# Patient Record
Sex: FEMALE | Race: WHITE | NOT HISPANIC OR LATINO | Employment: OTHER | URBAN - NONMETROPOLITAN AREA
[De-identification: names, ages, dates, MRNs, and addresses within clinical notes are randomized per-mention and may not be internally consistent; named-entity substitution may affect disease eponyms.]

---

## 2017-03-17 RX ORDER — FLUOXETINE HYDROCHLORIDE 20 MG/1
20 CAPSULE ORAL DAILY
COMMUNITY
End: 2018-01-04

## 2017-03-17 RX ORDER — HYDROCHLOROTHIAZIDE 25 MG/1
25 TABLET ORAL EVERY MORNING
COMMUNITY
End: 2018-09-07 | Stop reason: HOSPADM

## 2017-03-17 RX ORDER — SENNOSIDES 8.6 MG
650 CAPSULE ORAL EVERY 8 HOURS PRN
Status: ON HOLD | COMMUNITY
End: 2018-08-06

## 2017-03-17 RX ORDER — ASPIRIN 81 MG/1
81 TABLET, CHEWABLE ORAL EVERY MORNING
COMMUNITY

## 2017-03-17 RX ORDER — FUROSEMIDE 40 MG/1
40 TABLET ORAL DAILY
COMMUNITY
End: 2017-03-20

## 2017-03-17 RX ORDER — POTASSIUM CHLORIDE 750 MG/1
10 TABLET, FILM COATED, EXTENDED RELEASE ORAL DAILY
COMMUNITY
End: 2017-05-23 | Stop reason: SDUPTHER

## 2017-03-17 RX ORDER — TRAZODONE HYDROCHLORIDE 50 MG/1
50 TABLET ORAL NIGHTLY
COMMUNITY
End: 2017-03-20 | Stop reason: SDUPTHER

## 2017-03-17 RX ORDER — HYDROCODONE BITARTRATE AND ACETAMINOPHEN 7.5; 325 MG/1; MG/1
1 TABLET ORAL AS NEEDED
COMMUNITY
End: 2017-03-20 | Stop reason: CLARIF

## 2017-03-17 RX ORDER — CYCLOBENZAPRINE HCL 10 MG
10 TABLET ORAL 3 TIMES DAILY PRN
COMMUNITY
End: 2018-01-04

## 2017-03-17 RX ORDER — ISOSORBIDE MONONITRATE 30 MG/1
30 TABLET, EXTENDED RELEASE ORAL EVERY MORNING
COMMUNITY

## 2017-03-17 RX ORDER — PRAVASTATIN SODIUM 20 MG
20 TABLET ORAL NIGHTLY
COMMUNITY
End: 2017-05-23 | Stop reason: SDUPTHER

## 2017-03-17 RX ORDER — PRAMIPEXOLE DIHYDROCHLORIDE 0.25 MG/1
0.25 TABLET ORAL NIGHTLY
COMMUNITY

## 2017-03-17 RX ORDER — DILTIAZEM HYDROCHLORIDE 360 MG/1
360 CAPSULE, EXTENDED RELEASE ORAL NIGHTLY
COMMUNITY

## 2017-03-17 RX ORDER — NYSTATIN 100000 U/G
CREAM TOPICAL 2 TIMES DAILY
COMMUNITY
End: 2018-01-04

## 2017-03-20 ENCOUNTER — OFFICE VISIT (OUTPATIENT)
Dept: CARDIOLOGY | Facility: CLINIC | Age: 77
End: 2017-03-20

## 2017-03-20 VITALS
BODY MASS INDEX: 49.51 KG/M2 | HEIGHT: 64 IN | SYSTOLIC BLOOD PRESSURE: 120 MMHG | DIASTOLIC BLOOD PRESSURE: 52 MMHG | WEIGHT: 290 LBS | HEART RATE: 69 BPM | OXYGEN SATURATION: 94 %

## 2017-03-20 DIAGNOSIS — I10 ESSENTIAL HYPERTENSION: Primary | ICD-10-CM

## 2017-03-20 DIAGNOSIS — E78.5 HYPERLIPIDEMIA, UNSPECIFIED HYPERLIPIDEMIA TYPE: ICD-10-CM

## 2017-03-20 DIAGNOSIS — R06.09 DYSPNEA ON EXERTION: ICD-10-CM

## 2017-03-20 DIAGNOSIS — I20.9 ISCHEMIC CHEST PAIN (HCC): ICD-10-CM

## 2017-03-20 DIAGNOSIS — E66.01 MORBID OBESITY DUE TO EXCESS CALORIES (HCC): ICD-10-CM

## 2017-03-20 PROCEDURE — 99214 OFFICE O/P EST MOD 30 MIN: CPT | Performed by: INTERNAL MEDICINE

## 2017-03-20 RX ORDER — TRAZODONE HYDROCHLORIDE 100 MG/1
TABLET ORAL
Refills: 5 | COMMUNITY
Start: 2017-02-20 | End: 2018-01-04

## 2017-03-20 RX ORDER — LANSOPRAZOLE 30 MG/1
CAPSULE, DELAYED RELEASE ORAL
Refills: 5 | COMMUNITY
Start: 2017-02-20 | End: 2018-01-29 | Stop reason: SDUPTHER

## 2017-03-20 RX ORDER — HYDROCODONE BITARTRATE AND ACETAMINOPHEN 10; 325 MG/1; MG/1
1 TABLET ORAL EVERY 12 HOURS PRN
COMMUNITY
Start: 2017-03-13

## 2017-03-20 RX ORDER — GABAPENTIN 100 MG/1
200 CAPSULE ORAL NIGHTLY
COMMUNITY
Start: 2017-03-13

## 2017-03-20 RX ORDER — BUMETANIDE 1 MG/1
1 TABLET ORAL EVERY MORNING
COMMUNITY
Start: 2017-01-05

## 2017-03-20 RX ORDER — LISINOPRIL 20 MG/1
40 TABLET ORAL EVERY MORNING
COMMUNITY
Start: 2017-03-13

## 2017-03-20 RX ORDER — DULOXETIN HYDROCHLORIDE 60 MG/1
CAPSULE, DELAYED RELEASE ORAL
Refills: 6 | COMMUNITY
Start: 2017-02-20 | End: 2018-01-29 | Stop reason: SDUPTHER

## 2017-03-20 NOTE — PROGRESS NOTES
Chief complaint : Shortness of breath    History of Present Illness very pleasant 76-year-old female who comes today for a follow-up visit.  She complains of chest discomfort which is localized in the midsternal area.  The pain does not radiate.  She has no associated symptoms of diaphoresis.  The discomfort starts at minimal physical exertion and associated with shortness of breath.  It is relieved at rest.  Patient stated that the chest discomfort is becoming more constant and progressively getting worse.     Review of Systems   Constitution: Negative. Negative for decreased appetite, diaphoresis, weakness, night sweats, weight gain and weight loss.   HENT: Negative for headaches, hearing loss, nosebleeds and sore throat.    Eyes: Negative.  Negative for blurred vision and photophobia.   Cardiovascular: Positive for chest pain and dyspnea on exertion. Negative for claudication, irregular heartbeat, leg swelling, palpitations, paroxysmal nocturnal dyspnea and syncope.   Respiratory: Negative for cough, hemoptysis, shortness of breath and wheezing.    Endocrine: Negative for cold intolerance, heat intolerance, polydipsia, polyphagia and polyuria.   Hematologic/Lymphatic: Negative.    Skin: Negative for color change, dry skin, flushing, itching and rash.   Musculoskeletal: Negative.  Negative for muscle cramps, muscle weakness and myalgias.   Gastrointestinal: Negative for abdominal pain, change in bowel habit, diarrhea, hematemesis, melena, nausea and vomiting.   Genitourinary: Negative for dysuria, frequency and hematuria.   Neurological: Negative for dizziness, focal weakness, light-headedness, loss of balance, numbness and seizures.   Psychiatric/Behavioral: Negative.  Negative for substance abuse, suicidal ideas and thoughts of violence.   Allergic/Immunologic: Negative.        Past Medical History   Diagnosis Date   • Bronchitis    • Chest pain    • CHF (congestive heart failure)    • GERD (gastroesophageal  reflux disease)    • Hyperlipidemia    • Hypertension    • Sleep apnea        No family history on file.    Morphine and related     reports that she has quit smoking. She does not have any smokeless tobacco history on file. She reports that she does not drink alcohol or use illicit drugs.    Objective     Vital Signs  Heart Rate:  [69] 69  BP: (120)/(52) 120/52    Physical Exam   Constitutional: She is oriented to person, place, and time. She appears well-developed and well-nourished.   HENT:   Head: Normocephalic and atraumatic.   Eyes: Conjunctivae and EOM are normal. Pupils are equal, round, and reactive to light.   Neck: Neck supple. No JVD present. Carotid bruit is not present. No tracheal deviation and no edema present.   Cardiovascular: Normal rate, regular rhythm, S1 normal, S2 normal, normal heart sounds and intact distal pulses.  Exam reveals no gallop, no S3, no S4 and no friction rub.    No murmur heard.  Pulmonary/Chest: Effort normal and breath sounds normal. She has no wheezes. She has no rales. She exhibits no tenderness.   Abdominal: Bowel sounds are normal. She exhibits no abdominal bruit and no pulsatile midline mass. There is no rebound and no guarding.   Musculoskeletal: Normal range of motion. She exhibits no edema.   Neurological: She is alert and oriented to person, place, and time.   Skin: Skin is warm and dry.   Psychiatric: She has a normal mood and affect.       Procedures    Assessment/Plan     Patient Active Problem List   Diagnosis   • Morbid obesity due to excess calories   • Hyperlipidemia   • Essential hypertension     1. Essential hypertension  Patient's blood pressure is well-controlled.  We will continue with current medications.  Continue with low sodium diet.  Patient is advised to lose weight.    2. Hyperlipidemia, unspecified hyperlipidemia type  Patient persisted by her primary care provider.  We will try to obtain her lab results.  We will continue with current dosage of  pravastatin.    3. Morbid obesity due to excess calories  Patient is counseled regarding weight loss and regular exercise program.    4.  Chest pain  We will order nuclear perfusion imaging stress test.  Patient has a known nonobstructive coronary artery disease in the LAD and RCA.  Her last cardiac catheterization was 2013.    I discussed the patients findings and my recommendations with patient.    Robbie Bell MD  03/20/17  2:11 PM    EMR Dragon/Transcription disclaimer:   Much of this encounter note is an electronic transcription/translation of spoken language to printed text. The electronic translation of spoken language may permit erroneous, or at times, nonsensical words or phrases to be inadvertently transcribed; Although I have reviewed the note for such errors, some may still exist.

## 2017-03-24 DIAGNOSIS — R07.9 CHEST PAIN, UNSPECIFIED TYPE: Primary | ICD-10-CM

## 2017-04-03 ENCOUNTER — APPOINTMENT (OUTPATIENT)
Dept: CARDIOLOGY | Facility: HOSPITAL | Age: 77
End: 2017-04-03
Attending: INTERNAL MEDICINE

## 2017-04-03 ENCOUNTER — APPOINTMENT (OUTPATIENT)
Dept: NUCLEAR MEDICINE | Facility: HOSPITAL | Age: 77
End: 2017-04-03
Attending: INTERNAL MEDICINE

## 2017-04-10 ENCOUNTER — APPOINTMENT (OUTPATIENT)
Dept: NUCLEAR MEDICINE | Facility: HOSPITAL | Age: 77
End: 2017-04-10
Attending: INTERNAL MEDICINE

## 2017-04-10 ENCOUNTER — HOSPITAL ENCOUNTER (OUTPATIENT)
Dept: NUCLEAR MEDICINE | Facility: HOSPITAL | Age: 77
Discharge: HOME | End: 2017-04-10
Attending: INTERNAL MEDICINE

## 2017-04-10 ENCOUNTER — APPOINTMENT (OUTPATIENT)
Dept: CARDIOLOGY | Facility: HOSPITAL | Age: 77
End: 2017-04-10
Attending: INTERNAL MEDICINE

## 2017-04-10 DIAGNOSIS — E78.5 HYPERLIPIDEMIA, UNSPECIFIED HYPERLIPIDEMIA TYPE: ICD-10-CM

## 2017-04-10 DIAGNOSIS — E66.01 MORBID OBESITY DUE TO EXCESS CALORIES (HCC): ICD-10-CM

## 2017-04-10 DIAGNOSIS — R06.09 DYSPNEA ON EXERTION: ICD-10-CM

## 2017-04-10 DIAGNOSIS — I10 ESSENTIAL HYPERTENSION: ICD-10-CM

## 2017-04-10 DIAGNOSIS — I20.9 ISCHEMIC CHEST PAIN (HCC): ICD-10-CM

## 2017-04-10 PROCEDURE — A9500 TC99M SESTAMIBI: HCPCS | Performed by: INTERNAL MEDICINE

## 2017-04-10 PROCEDURE — 0 TECHNETIUM SESTAMIBI: Performed by: INTERNAL MEDICINE

## 2017-04-10 RX ADMIN — Medication 1 DOSE: at 09:52

## 2017-04-11 ENCOUNTER — HOSPITAL ENCOUNTER (OUTPATIENT)
Dept: NUCLEAR MEDICINE | Facility: HOSPITAL | Age: 77
Discharge: HOME | End: 2017-04-11
Attending: INTERNAL MEDICINE

## 2017-04-11 ENCOUNTER — HOSPITAL ENCOUNTER (OUTPATIENT)
Dept: CARDIOLOGY | Facility: HOSPITAL | Age: 77
Discharge: HOME | End: 2017-04-11
Attending: INTERNAL MEDICINE | Admitting: INTERNAL MEDICINE

## 2017-04-11 PROCEDURE — A9500 TC99M SESTAMIBI: HCPCS | Performed by: INTERNAL MEDICINE

## 2017-04-11 PROCEDURE — 78452 HT MUSCLE IMAGE SPECT MULT: CPT

## 2017-04-11 PROCEDURE — 78452 HT MUSCLE IMAGE SPECT MULT: CPT | Performed by: INTERNAL MEDICINE

## 2017-04-11 PROCEDURE — 93016 CV STRESS TEST SUPVJ ONLY: CPT | Performed by: INTERNAL MEDICINE

## 2017-04-11 PROCEDURE — 93017 CV STRESS TEST TRACING ONLY: CPT

## 2017-04-11 PROCEDURE — 93018 CV STRESS TEST I&R ONLY: CPT | Performed by: INTERNAL MEDICINE

## 2017-04-11 PROCEDURE — 0 TECHNETIUM SESTAMIBI: Performed by: INTERNAL MEDICINE

## 2017-04-11 RX ORDER — 0.9 % SODIUM CHLORIDE 0.9 %
10 VIAL (ML) INJECTION AS NEEDED
Status: DISCONTINUED | OUTPATIENT
Start: 2017-04-11 | End: 2017-04-12 | Stop reason: HOSPADM

## 2017-04-11 RX ADMIN — Medication 1 DOSE: at 08:58

## 2017-04-11 RX ADMIN — REGADENOSON 0.4 MG: 0.08 INJECTION, SOLUTION INTRAVENOUS at 08:58

## 2017-04-11 RX ADMIN — SODIUM CHLORIDE 10 ML: 9 INJECTION INTRAMUSCULAR; INTRAVENOUS; SUBCUTANEOUS at 08:58

## 2017-04-13 LAB
BH CV NUCLEAR PRIOR STUDY: 2
BH CV STRESS BP STAGE 1: NORMAL
BH CV STRESS COMMENTS STAGE 1: NORMAL
BH CV STRESS DOSE REGADENOSON STAGE 1: 0.4
BH CV STRESS DURATION MIN STAGE 1: 0
BH CV STRESS DURATION SEC STAGE 1: 10
BH CV STRESS HR STAGE 1: 56
BH CV STRESS PROTOCOL 1: NORMAL
BH CV STRESS RECOVERY BP: NORMAL MMHG
BH CV STRESS RECOVERY HR: 69 BPM
BH CV STRESS STAGE 1: 1
LV EF NUC BP: 68 %
MAXIMAL PREDICTED HEART RATE: 146 BPM
PERCENT MAX PREDICTED HR: 55.48 %
STRESS BASELINE BP: NORMAL MMHG
STRESS BASELINE HR: 61 BPM
STRESS PERCENT HR: 65 %
STRESS POST ESTIMATED WORKLOAD: 1 METS
STRESS POST PEAK BP: NORMAL MMHG
STRESS POST PEAK HR: 81 BPM
STRESS TARGET HR: 124 BPM

## 2017-04-18 RX ORDER — PRAMIPEXOLE DIHYDROCHLORIDE 0.25 MG/1
TABLET ORAL
Qty: 30 TABLET | Refills: 5 | OUTPATIENT
Start: 2017-04-18

## 2017-05-02 ENCOUNTER — TELEPHONE (OUTPATIENT)
Dept: CARDIOLOGY | Facility: CLINIC | Age: 77
End: 2017-05-02

## 2017-05-26 RX ORDER — POTASSIUM CHLORIDE 750 MG/1
TABLET, FILM COATED, EXTENDED RELEASE ORAL
Qty: 30 TABLET | Refills: 0 | Status: SHIPPED | OUTPATIENT
Start: 2017-05-26 | End: 2018-01-29 | Stop reason: SDUPTHER

## 2017-05-26 RX ORDER — PRAVASTATIN SODIUM 20 MG
TABLET ORAL
Qty: 30 TABLET | Refills: 6 | Status: SHIPPED | OUTPATIENT
Start: 2017-05-26 | End: 2018-01-29 | Stop reason: SDUPTHER

## 2017-06-14 ENCOUNTER — OFFICE VISIT (OUTPATIENT)
Dept: PULMONOLOGY | Facility: CLINIC | Age: 77
End: 2017-06-14

## 2017-06-14 VITALS
OXYGEN SATURATION: 97 % | BODY MASS INDEX: 49 KG/M2 | SYSTOLIC BLOOD PRESSURE: 122 MMHG | DIASTOLIC BLOOD PRESSURE: 63 MMHG | HEART RATE: 68 BPM | WEIGHT: 287 LBS | HEIGHT: 64 IN

## 2017-06-14 DIAGNOSIS — J98.4 RESTRICTIVE LUNG DISEASE: ICD-10-CM

## 2017-06-14 DIAGNOSIS — R06.02 SHORTNESS OF BREATH: Primary | ICD-10-CM

## 2017-06-14 PROCEDURE — 94010 BREATHING CAPACITY TEST: CPT | Performed by: INTERNAL MEDICINE

## 2017-06-14 PROCEDURE — 99203 OFFICE O/P NEW LOW 30 MIN: CPT | Performed by: INTERNAL MEDICINE

## 2017-06-14 RX ORDER — LIDOCAINE AND PRILOCAINE 25; 25 MG/G; MG/G
CREAM TOPICAL
COMMUNITY
Start: 2017-04-24 | End: 2018-01-29 | Stop reason: ALTCHOICE

## 2017-06-14 NOTE — PROGRESS NOTES
Subjective   Stephania Judd is a 76 y.o. female.   Chief complaint is shortness of breath  History of Present Illness   This lady is had dyspnea for number of years however the last few months is getting worse.  She complains of cough, intermittent wheezing, occasional sputum production and dyspnea on walking 100 feet.  She has been obese most of her life and has chronic back pain and walks with a cane.  She is also had pneumonia previously as well as depression, anemia, high blood pressure, arthritis.  Surgical history includes hysterectomy back surgery.  Medications please see her list.  She does not take breathing medications.  Family history is positive for multiple illnesses.  Social history smoked a small amount and her use  The following portions of the patient's history were reviewed and updated as appropriate: allergies, current medications, past family history, past medical history, past social history, past surgical history and problem list.    Review of Systems   Constitutional: Negative for activity change, chills, fatigue, fever and unexpected weight change.   HENT: Negative for congestion, dental problem, ear discharge, ear pain, facial swelling, hearing loss, nosebleeds, postnasal drip, rhinorrhea, sinus pressure, sneezing, sore throat, tinnitus, trouble swallowing and voice change.    Eyes: Negative.  Negative for photophobia, pain, discharge, redness, itching and visual disturbance.   Respiratory: Positive for cough, chest tightness, shortness of breath and wheezing.    Cardiovascular: Positive for chest pain and leg swelling. Negative for palpitations.   Gastrointestinal: Negative for abdominal distention, abdominal pain and vomiting.   Endocrine: Negative.  Negative for cold intolerance, heat intolerance, polydipsia and polyphagia.   Genitourinary: Positive for frequency. Negative for decreased urine volume, dysuria, enuresis, flank pain, hematuria and urgency.   Musculoskeletal: Positive  for arthralgias and back pain. Negative for gait problem, joint swelling, myalgias and neck pain.   Skin: Negative for color change, pallor, rash and wound.   Allergic/Immunologic: Negative.  Negative for environmental allergies, food allergies and immunocompromised state.   Neurological: Negative.  Negative for dizziness, tremors, seizures, syncope, facial asymmetry, speech difficulty, weakness, light-headedness, numbness and headaches.   Hematological: Negative for adenopathy. Does not bruise/bleed easily.   Psychiatric/Behavioral: Positive for dysphoric mood. Negative for agitation, behavioral problems, confusion, decreased concentration, hallucinations and self-injury. The patient is not hyperactive.    All other systems reviewed and are negative.      Objective   Physical Exam   Constitutional: She appears well-developed and well-nourished. No distress.   HENT:   Head: Normocephalic.   Mouth/Throat: No oropharyngeal exudate.   Eyes: Conjunctivae are normal. Pupils are equal, round, and reactive to light. Right eye exhibits no discharge. Left eye exhibits no discharge. No scleral icterus.   Neck: Normal range of motion. Neck supple. No JVD present. No tracheal deviation present. No thyromegaly present.   Cardiovascular: Normal rate, regular rhythm, normal heart sounds and intact distal pulses.  Exam reveals no gallop and no friction rub.    No murmur heard.  Pulmonary/Chest: Breath sounds normal. She is in respiratory distress ( Diminished breath sounds no rales rhonchi or wheeze). She has no wheezes. She has no rales. She exhibits no tenderness.   Abdominal: Bowel sounds are normal. She exhibits no distension and no mass. There is no tenderness. There is no guarding.   Musculoskeletal: She exhibits edema. She exhibits no tenderness or deformity.   Lymphadenopathy:     She has no cervical adenopathy.   Neurological: She is alert. She has normal reflexes. She displays normal reflexes. No cranial nerve deficit.  She exhibits normal muscle tone. Coordination normal.   Skin: Skin is warm. Rash:  An area of cellulitis of the left lower leg. She is not diaphoretic ( Morbidly obese white female, walks with a cane, not dyspneic at rest). There is erythema. No pallor.   Psychiatric: She has a normal mood and affect. Her behavior is normal. Judgment and thought content normal.   Nursing note and vitals reviewed.      Assessment/Plan   Stephania was seen today for shortness of breath.    Diagnoses and all orders for this visit:    Shortness of breath  -     Spirometry Without Bronchodilator    Restrictive lung disease      Chest x-ray was negative, O2 saturation 97% on room air, spirometry revealed very severe restriction with minimal obstructive component    Impression restrictive lung disease, dyspnea multifactorial, minimal obstructive lung disease    Recommendations a trial of breo inhaler, doubtful that much improvement will be noted because her restrictive lung disease is from obesity and deconditioning.  I will see her back in 2 weeks

## 2017-06-28 ENCOUNTER — OFFICE VISIT (OUTPATIENT)
Dept: PULMONOLOGY | Facility: CLINIC | Age: 77
End: 2017-06-28

## 2017-06-28 VITALS
HEART RATE: 98 BPM | BODY MASS INDEX: 49.51 KG/M2 | HEIGHT: 64 IN | SYSTOLIC BLOOD PRESSURE: 158 MMHG | WEIGHT: 290 LBS | OXYGEN SATURATION: 96 % | DIASTOLIC BLOOD PRESSURE: 82 MMHG

## 2017-06-28 DIAGNOSIS — J98.4 RESTRICTIVE LUNG DISEASE: Primary | ICD-10-CM

## 2017-06-28 DIAGNOSIS — E66.01 MORBID OBESITY DUE TO EXCESS CALORIES (HCC): ICD-10-CM

## 2017-06-28 PROCEDURE — 99213 OFFICE O/P EST LOW 20 MIN: CPT | Performed by: INTERNAL MEDICINE

## 2017-06-28 RX ORDER — ALBUTEROL SULFATE 90 UG/1
AEROSOL, METERED RESPIRATORY (INHALATION)
Qty: 1 INHALER | Refills: 5 | Status: SHIPPED | OUTPATIENT
Start: 2017-06-28

## 2018-01-04 ENCOUNTER — OFFICE VISIT (OUTPATIENT)
Dept: GASTROENTEROLOGY | Facility: CLINIC | Age: 78
End: 2018-01-04

## 2018-01-04 VITALS
DIASTOLIC BLOOD PRESSURE: 88 MMHG | WEIGHT: 267.6 LBS | SYSTOLIC BLOOD PRESSURE: 142 MMHG | HEIGHT: 64 IN | BODY MASS INDEX: 45.68 KG/M2 | HEART RATE: 103 BPM

## 2018-01-04 DIAGNOSIS — R11.0 NAUSEA: ICD-10-CM

## 2018-01-04 DIAGNOSIS — R10.13 EPIGASTRIC PAIN: Primary | ICD-10-CM

## 2018-01-04 DIAGNOSIS — Z12.11 ENCOUNTER FOR SCREENING FOR MALIGNANT NEOPLASM OF COLON: ICD-10-CM

## 2018-01-04 PROCEDURE — 99214 OFFICE O/P EST MOD 30 MIN: CPT | Performed by: NURSE PRACTITIONER

## 2018-01-04 RX ORDER — DEXTROSE AND SODIUM CHLORIDE 5; .45 G/100ML; G/100ML
30 INJECTION, SOLUTION INTRAVENOUS CONTINUOUS PRN
Status: CANCELLED | OUTPATIENT
Start: 2018-02-05

## 2018-01-04 RX ORDER — TRAZODONE HYDROCHLORIDE 150 MG/1
TABLET ORAL
Refills: 5 | COMMUNITY
Start: 2017-10-12 | End: 2018-01-29 | Stop reason: SDUPTHER

## 2018-01-04 RX ORDER — DEXTROSE AND SODIUM CHLORIDE 5; .45 G/100ML; G/100ML
30 INJECTION, SOLUTION INTRAVENOUS CONTINUOUS PRN
Status: CANCELLED | OUTPATIENT
Start: 2018-01-04

## 2018-01-04 NOTE — PROGRESS NOTES
Chief Complaint   Patient presents with   • Abdominal Pain       Subjective    Stephania Judd is a 77 y.o. female. she is here today To discuss abdominal pain.    Abdominal Pain   This is a recurrent problem. The current episode started more than 1 month ago. The onset quality is gradual. The problem occurs daily. The problem has been gradually worsening. The pain is located in the epigastric region. The quality of the pain is burning and sharp (like needles in my stomach ). Associated symptoms include belching (indigestion ) and constipation (about once a week ). Pertinent negatives include no anorexia, arthralgias, diarrhea, fever, flatus, frequency, hematochezia, melena, myalgias, nausea or vomiting. The pain is aggravated by eating. Relieved by: bland diet helps. Her past medical history is significant for GERD.   77-year-old female presents to discuss abdominal pain, constipation and nausea.  States she had similar symptoms about 30 years ago was diagnosed with gastric ulcers.  States her most recent scopes were done early 2000's.  Do not have those results available.  She thinks it was when she was living in [facility].  She reports indigestion/epigastric pain occurs frequently and worsen around mealtimes.  Reports nausea denies any vomiting.  She is lost 22 pounds since June of last year however states in November early December she had flu and has not fully regained her appetite or strength back since that time.  She also would like to discuss constipation states her bowel movements about once a week and she does strain.  She has seen some very dark stool at times.  Plans: We'll start patient on MiraLAX daily for constipation, schedule patient for EGD due to epigastric pain, history of gastric ulcers.  Schedule patient for screening colonoscopy.  Follow-up after test return office sooner if needed.         The following portions of the patient's history were reviewed and updated as  appropriate:   Past Medical History:   Diagnosis Date   • Bronchitis    • Chest pain    • CHF (congestive heart failure)    • GERD (gastroesophageal reflux disease)    • Hyperlipidemia    • Hypertension    • Sleep apnea      Past Surgical History:   Procedure Laterality Date   • APPENDECTOMY     • BACK SURGERY     • COLONOSCOPY     • HYSTERECTOMY     • TONSILLECTOMY       Family History   Problem Relation Age of Onset   • Heart disease Mother    • Hypertension Mother    • Hypertension Sister    • Diabetes Sister    • Heart disease Brother    • Hypertension Brother    • Diabetes Brother    • Heart disease Brother    • Hypertension Brother    • Diabetes Brother      OB History     No data available        Current Outpatient Prescriptions   Medication Sig Dispense Refill   • acetaminophen (TYLENOL) 650 MG 8 hr tablet Take 650 mg by mouth Every 8 (Eight) Hours As Needed for Mild Pain (1-3).     • albuterol (VENTOLIN HFA) 108 (90 BASE) MCG/ACT inhaler 2 puffs every 4 hours as needed for breathing 1 inhaler 5   • aspirin 81 MG chewable tablet Chew 81 mg Daily.     • bumetanide (BUMEX) 1 MG tablet Take 1 tablet by mouth Daily.     • diltiaZEM (TAZTIA XT) 360 MG 24 hr capsule Take 360 mg by mouth Daily.     • DULoxetine (CYMBALTA) 60 MG capsule TAKE 1 CAPSULE EVERY MORNING  6   • gabapentin (NEURONTIN) 100 MG capsule      • hydrochlorothiazide (HYDRODIURIL) 25 MG tablet Take 25 mg by mouth Daily.     • HYDROcodone-acetaminophen (NORCO)  MG per tablet      • isosorbide mononitrate (IMDUR) 30 MG 24 hr tablet Take 30 mg by mouth Daily.     • lansoprazole (PREVACID) 30 MG capsule TAKE 1 CAPSULE EVERY MORNING BEFORE BREAKFAST  5   • lidocaine-prilocaine (EMLA) 2.5-2.5 % cream      • lisinopril (PRINIVIL,ZESTRIL) 20 MG tablet Take 1 tablet by mouth Daily.     • metoprolol tartrate (LOPRESSOR) 25 MG tablet Take 25 mg by mouth 2 (Two) Times a Day.     • potassium chloride (K-DUR) 10 MEQ CR tablet TAKE 1 TABLET BY MOUTH EVERY  DAY 30 tablet 0   • pramipexole (MIRAPEX) 0.25 MG tablet Take 0.25 mg by mouth Every Night.     • pravastatin (PRAVACHOL) 20 MG tablet TAKE 1 TABLET BY MOUTH EVERY EVENING 30 tablet 6   • traZODone (DESYREL) 150 MG tablet TAKE 1 TABLET BY MOUTH AT BEDTIME  5   • polyethylene glycol (GoLYTELY) 236 g solution Starting on day prior to procedure, drink 8 ounces every 30 minutes-half gallon repeat next morning at specified time until gone 4000 mL 0   • polyethylene glycol (MIRALAX) powder powder Take 34 g by mouth Daily. One scoop dissolved into 8 ounces of liquid daily by mouth. 850 g 3     No current facility-administered medications for this visit.      Allergies   Allergen Reactions   • Morphine And Related Nausea And Vomiting     Social History     Social History   • Marital status:      Spouse name: N/A   • Number of children: N/A   • Years of education: N/A     Social History Main Topics   • Smoking status: Former Smoker   • Smokeless tobacco: Never Used   • Alcohol use No   • Drug use: No   • Sexual activity: Defer     Other Topics Concern   • None     Social History Narrative       Review of Systems  Review of Systems   Constitutional: Positive for appetite change, fatigue and unexpected weight change (down 22pounds states she recently had flu early December ). Negative for activity change, chills, diaphoresis and fever.   HENT: Positive for trouble swallowing (intermittent tightening ). Negative for sore throat.    Respiratory: Negative for shortness of breath.    Gastrointestinal: Positive for abdominal pain and constipation (about once a week ). Negative for abdominal distention, anal bleeding, anorexia, blood in stool, diarrhea, flatus, hematochezia, melena, nausea, rectal pain and vomiting.   Genitourinary: Negative for frequency.   Musculoskeletal: Negative for arthralgias and myalgias.   Skin: Negative for pallor.   Neurological: Positive for weakness. Negative for light-headedness.        BP  "142/88 (BP Location: Left arm, Patient Position: Sitting, Cuff Size: Adult)  Pulse 103  Ht 162.6 cm (64.02\")  Wt 121 kg (267 lb 9.6 oz)  BMI 45.91 kg/m2    Objective    Physical Exam   Constitutional: She is oriented to person, place, and time. She appears well-developed and well-nourished. She is cooperative. No distress.   HENT:   Head: Normocephalic and atraumatic.   Neck: Normal range of motion. Neck supple. No thyromegaly present.   Cardiovascular: Normal rate, regular rhythm and normal heart sounds.    Pulmonary/Chest: Effort normal and breath sounds normal. She has no wheezes. She has no rhonchi. She has no rales.   Abdominal: Soft. Normal appearance and bowel sounds are normal. She exhibits no shifting dullness and no distension. There is no hepatosplenomegaly. There is tenderness in the epigastric area. There is no rigidity and no guarding. No hernia.   Lymphadenopathy:     She has no cervical adenopathy.   Neurological: She is alert and oriented to person, place, and time.   Skin: Skin is warm, dry and intact. No rash noted. No pallor.   Psychiatric: She has a normal mood and affect. Her speech is normal.     Appointment on 04/10/2017   Component Date Value Ref Range Status   •  CV STRESS PROTOCOL 1 04/11/2017 Pharmacologic   Final   • Stage 1 04/11/2017 1   Final   • Duration Min Stage 1 04/11/2017 0   Final   • Duration Sec Stage 1 04/11/2017 10   Final   • Stress Dose Regadenoson Stage 1 04/11/2017 0.4   Final   • Stress Comments Stage 1 04/11/2017 10 sec bolus injection   Final   • Target HR (85%) 04/11/2017 124  bpm Final   • Max. Pred. HR (100%) 04/11/2017 146  bpm Final   • HR Stage 1 04/11/2017 56   Final   • BP Stage 1 04/11/2017 142/59   Final   • Baseline HR 04/11/2017 61  bpm Final   • Baseline BP 04/11/2017 142/59  mmHg Final   • Peak HR 04/11/2017 81  bpm Final   • Percent Max Pred HR 04/11/2017 55.48  % Final   • Percent Target HR 04/11/2017 65  % Final   • Peak BP 04/11/2017 148/62  " mmHg Final   • Recovery HR 04/11/2017 69  bpm Final   • Recovery BP 04/11/2017 142/62  mmHg Final   • Estimated workload 04/11/2017 1.0  METS Final   • Nuclear Prior Study 04/11/2017 2   Final   • Nuc Stress EF 04/11/2017 68  % Final     Assessment/Plan      1. Epigastric pain    2. Nausea    3. Encounter for screening for malignant neoplasm of colon    .       Orders placed during this encounter include:      ESOPHAGOGASTRODUODENOSCOPY possible dilation  (N/A)    Review and/or summary of lab tests, radiology, procedures, medications. Review and summary of old records and obtaining of history. The risks and benefits of my recommendations, as well as other treatment options were discussed with the patient today. Questions were answered.    New Medications Ordered This Visit   Medications   • polyethylene glycol (MIRALAX) powder powder     Sig: Take 34 g by mouth Daily. One scoop dissolved into 8 ounces of liquid daily by mouth.     Dispense:  850 g     Refill:  3   • polyethylene glycol (GoLYTELY) 236 g solution     Sig: Starting on day prior to procedure, drink 8 ounces every 30 minutes-half gallon repeat next morning at specified time until gone     Dispense:  4000 mL     Refill:  0       Follow-up: Return in about 4 weeks (around 2/1/2018).          This document has been electronically signed by MARLENI Blackman on January 4, 2018 10:47 AM             Results for orders placed or performed in visit on 04/10/17   Stress Test With Myocardial Perfusion - Two Day   Result Value Ref Range    BH CV STRESS PROTOCOL 1 Pharmacologic     Stage 1 1     Duration Min Stage 1 0     Duration Sec Stage 1 10     Stress Dose Regadenoson Stage 1 0.4     Stress Comments Stage 1 10 sec bolus injection     Target HR (85%) 124 bpm    Max. Pred. HR (100%) 146 bpm    HR Stage 1 56     BP Stage 1 142/59     Baseline HR 61 bpm    Baseline /59 mmHg    Peak HR 81 bpm    Percent Max Pred HR 55.48 %    Percent Target HR 65 %    Peak BP  148/62 mmHg    Recovery HR 69 bpm    Recovery /62 mmHg    Estimated workload 1.0 METS    Nuclear Prior Study 2     Nuc Stress EF 68 %   Results for orders placed or performed in visit on 09/30/14   Urinalysis Without Microscopic   Result Value Ref Range    Color, UA YELLOW     Appearance CLEAR     Specific Gravity, UA 1.021 1.003 - 1.030    pH, UA 6.5 pH Units    Leukocytes, UA NEGATIVE NEGATIVE    Nitrite, UA NEGATIVE NEGATIVE    Protein, UA NEGATIVE NEGATIVE    Glucose, Urine NEGATIVE NEGATIVE mg/dl    Ketones, UA NEGATIVE NEGATIVE    Urobilinogen, UA 0.2 0.2 EU/dl    Blood, UA NEGATIVE NEGATIVE   Results for orders placed or performed during the hospital encounter of 08/30/14   proBNP   Result Value Ref Range    NT-proBNP 410 0 - 900 pg/ml   Urinalysis, Microscopic only   Result Value Ref Range    WBC, UA 20-30 (A) 0 - 5  /HPF    RBC, UA 0 0 - 2  /HPF    Bacteria, UA 2+ (A) 0  /HPF    Mucus, UA TRACE     Epithelial Cells, UA Rare Squamous  /HPF   CK MB   Result Value Ref Range    CKMB 0.5 0.0 - 5.0 ng/ml   CK MB   Result Value Ref Range    CKMB 0.4 0.0 - 5.0 ng/ml   CK MB   Result Value Ref Range    CKMB 0.5 0.0 - 5.0 ng/ml   Troponin   Result Value Ref Range    Troponin I <0.012 0.000 - 0.034 ng/ml   Troponin   Result Value Ref Range    Troponin I <0.012 0.000 - 0.034 ng/ml   Troponin   Result Value Ref Range    Troponin I <0.012 0.000 - 0.034 ng/ml   Urinalysis Without Microscopic   Result Value Ref Range    Color, UA YELLOW     Appearance CLEAR     Specific Gravity, UA 1.014 1.003 - 1.030    pH, UA 6.5 pH Units    Leukocytes, UA 1+ (A) NEGATIVE    Nitrite, UA NEGATIVE NEGATIVE    Protein, UA NEGATIVE NEGATIVE    Glucose, Urine NEGATIVE NEGATIVE mg/dl    Ketones, UA NEGATIVE NEGATIVE    Urobilinogen, UA 1.0 (A) 0.2 EU/dl    Blood, UA NEGATIVE NEGATIVE   APTT   Result Value Ref Range    PTT 26.0 22.5 - 36.7 seconds   Protime-INR   Result Value Ref Range    Protime 13.1 11.1 - 15.3 seconds    INR 1.0 0.0 -  3.5   D-dimer, quantitative   Result Value Ref Range    D-Dimer, Quant 881 (H) 0 - 470 ng/ml FEU   CBC and Differential   Result Value Ref Range    WBC 9.7 3.2 - 9.8 x1000/uL    RBC 4.46 3.77 - 5.16 jeffrey/mm3    Hemoglobin 12.9 12.0 - 15.5 gm/dl    Hematocrit 39.4 35.0 - 45.0 %    MCV 88.3 80.0 - 98.0 fl    MCH 28.9 26.0 - 34.0 pg    MCHC 32.7 31.4 - 36.0 gm/dl    RDW 13.9 11.5 - 14.5 %    Platelets 227 150 - 450 x1000/mm3    MPV 10.7 8.0 - 12.0 fl    Neutrophil Rel % 52.7 37.0 - 80.0 %    Lymphocyte Rel % 29.4 10.0 - 50.0 %    Monocyte Rel % 8.9 0.0 - 12.0 %    Eosinophil Rel % 8.4 (H) 0.0 - 7.0 %    Basophil Rel % 0.2 0.0 - 2.0 %    Immature Granulocyte Rel % 0.40 0.00 - 0.50 %    Neutrophils Absolute 5.13 2.00 - 8.60 x1000/uL    Lymphocytes Absolute 2.86 0.60 - 4.20 x1000/uL    Monocytes Absolute 0.87 0.00 - 0.90 x1000/uL    Eosinophils Absolute 0.82 (H) 0.00 - 0.70 x1000/uL    Basophils Absolute 0.02 0.00 - 0.20 x1000/uL    Immature Granulocytes Absolute 0.040 (H) 0.005 - 0.022 x1000/uL   CK   Result Value Ref Range    Creatine Kinase 41 30 - 135 U/L   CK   Result Value Ref Range    Creatine Kinase 31 30 - 135 U/L   CK   Result Value Ref Range    Creatine Kinase 39 30 - 135 U/L   Comprehensive metabolic panel   Result Value Ref Range    Sodium 137 137 - 145 mmol/L    Potassium 3.9 3.5 - 5.1 mmol/L    Chloride 99 95 - 110 mmol/L    CO2 25 22 - 31 mmol/L    Anion Gap 13.0 5.0 - 15.0 mmol/L    Glucose 98 60 - 100 mg/dl    BUN 19 7 - 21 mg/dl    Creatinine 0.9 0.5 - 1.0 mg/dl    GFR MDRD Non  62 39 - 90 mL/min/1.73 sq.M    GFR MDRD African American 75 39 - 90 mL/min/1.73 sq.M    Calcium 9.4 8.4 - 10.2 mg/dl    Total Protein 7.6 6.3 - 8.6 gm/dl    Albumin 4.4 3.4 - 4.8 gm/dl    Total Bilirubin 0.8 0.2 - 1.3 mg/dl    Alkaline Phosphatase 115 38 - 126 U/L    AST (SGOT) 29 14 - 36 U/L    ALT (SGPT) 18 9 - 52 U/L     *Note: Due to a large number of results and/or encounters for the requested time period,  some results have not been displayed. A complete set of results can be found in Results Review.

## 2018-01-29 RX ORDER — POTASSIUM CHLORIDE 750 MG/1
10 TABLET, FILM COATED, EXTENDED RELEASE ORAL NIGHTLY
COMMUNITY

## 2018-01-29 RX ORDER — LANSOPRAZOLE 30 MG/1
30 CAPSULE, DELAYED RELEASE ORAL DAILY
COMMUNITY
End: 2018-02-19

## 2018-01-29 RX ORDER — DULOXETIN HYDROCHLORIDE 60 MG/1
60 CAPSULE, DELAYED RELEASE ORAL EVERY MORNING
COMMUNITY

## 2018-01-29 RX ORDER — PRAVASTATIN SODIUM 20 MG
20 TABLET ORAL NIGHTLY
COMMUNITY

## 2018-01-29 RX ORDER — TRAZODONE HYDROCHLORIDE 100 MG/1
200 TABLET ORAL NIGHTLY
COMMUNITY

## 2018-02-05 ENCOUNTER — HOSPITAL ENCOUNTER (OUTPATIENT)
Facility: HOSPITAL | Age: 78
Setting detail: HOSPITAL OUTPATIENT SURGERY
Discharge: HOME | End: 2018-02-05
Attending: INTERNAL MEDICINE | Admitting: INTERNAL MEDICINE

## 2018-02-05 ENCOUNTER — ANESTHESIA (OUTPATIENT)
Dept: GASTROENTEROLOGY | Facility: HOSPITAL | Age: 78
End: 2018-02-05

## 2018-02-05 ENCOUNTER — ANESTHESIA EVENT (OUTPATIENT)
Dept: GASTROENTEROLOGY | Facility: HOSPITAL | Age: 78
End: 2018-02-05

## 2018-02-05 VITALS
WEIGHT: 261 LBS | HEIGHT: 64 IN | TEMPERATURE: 98.3 F | HEART RATE: 96 BPM | BODY MASS INDEX: 44.56 KG/M2 | DIASTOLIC BLOOD PRESSURE: 54 MMHG | OXYGEN SATURATION: 98 % | RESPIRATION RATE: 20 BRPM | SYSTOLIC BLOOD PRESSURE: 140 MMHG

## 2018-02-05 DIAGNOSIS — Z12.11 ENCOUNTER FOR SCREENING FOR MALIGNANT NEOPLASM OF COLON: ICD-10-CM

## 2018-02-05 DIAGNOSIS — R11.0 NAUSEA: ICD-10-CM

## 2018-02-05 DIAGNOSIS — R10.13 EPIGASTRIC PAIN: ICD-10-CM

## 2018-02-05 PROCEDURE — 43239 EGD BIOPSY SINGLE/MULTIPLE: CPT | Performed by: INTERNAL MEDICINE

## 2018-02-05 PROCEDURE — 45385 COLONOSCOPY W/LESION REMOVAL: CPT | Performed by: INTERNAL MEDICINE

## 2018-02-05 PROCEDURE — 88305 TISSUE EXAM BY PATHOLOGIST: CPT | Performed by: PATHOLOGY

## 2018-02-05 PROCEDURE — 25010000002 PROPOFOL 10 MG/ML EMULSION: Performed by: NURSE ANESTHETIST, CERTIFIED REGISTERED

## 2018-02-05 PROCEDURE — 88305 TISSUE EXAM BY PATHOLOGIST: CPT | Performed by: INTERNAL MEDICINE

## 2018-02-05 RX ORDER — PROMETHAZINE HYDROCHLORIDE 25 MG/ML
12.5 INJECTION, SOLUTION INTRAMUSCULAR; INTRAVENOUS ONCE AS NEEDED
Status: DISCONTINUED | OUTPATIENT
Start: 2018-02-05 | End: 2018-02-05 | Stop reason: HOSPADM

## 2018-02-05 RX ORDER — PROMETHAZINE HYDROCHLORIDE 25 MG/1
25 TABLET ORAL ONCE AS NEEDED
Status: DISCONTINUED | OUTPATIENT
Start: 2018-02-05 | End: 2018-02-05 | Stop reason: HOSPADM

## 2018-02-05 RX ORDER — DEXAMETHASONE SODIUM PHOSPHATE 4 MG/ML
8 INJECTION, SOLUTION INTRA-ARTICULAR; INTRALESIONAL; INTRAMUSCULAR; INTRAVENOUS; SOFT TISSUE ONCE AS NEEDED
Status: DISCONTINUED | OUTPATIENT
Start: 2018-02-05 | End: 2018-02-05 | Stop reason: HOSPADM

## 2018-02-05 RX ORDER — DEXTROSE AND SODIUM CHLORIDE 5; .45 G/100ML; G/100ML
30 INJECTION, SOLUTION INTRAVENOUS CONTINUOUS PRN
Status: DISCONTINUED | OUTPATIENT
Start: 2018-02-05 | End: 2018-02-05 | Stop reason: HOSPADM

## 2018-02-05 RX ORDER — LIDOCAINE HYDROCHLORIDE 10 MG/ML
INJECTION, SOLUTION INFILTRATION; PERINEURAL AS NEEDED
Status: DISCONTINUED | OUTPATIENT
Start: 2018-02-05 | End: 2018-02-05 | Stop reason: SURG

## 2018-02-05 RX ORDER — ONDANSETRON 2 MG/ML
4 INJECTION INTRAMUSCULAR; INTRAVENOUS ONCE AS NEEDED
Status: DISCONTINUED | OUTPATIENT
Start: 2018-02-05 | End: 2018-02-05 | Stop reason: HOSPADM

## 2018-02-05 RX ORDER — PROPOFOL 10 MG/ML
VIAL (ML) INTRAVENOUS AS NEEDED
Status: DISCONTINUED | OUTPATIENT
Start: 2018-02-05 | End: 2018-02-05 | Stop reason: SURG

## 2018-02-05 RX ORDER — PROMETHAZINE HYDROCHLORIDE 25 MG/1
25 SUPPOSITORY RECTAL ONCE AS NEEDED
Status: DISCONTINUED | OUTPATIENT
Start: 2018-02-05 | End: 2018-02-05 | Stop reason: HOSPADM

## 2018-02-05 RX ADMIN — LIDOCAINE HYDROCHLORIDE 100 MG: 10 INJECTION, SOLUTION INFILTRATION; PERINEURAL at 12:21

## 2018-02-05 RX ADMIN — PROPOFOL 50 MG: 10 INJECTION, EMULSION INTRAVENOUS at 12:34

## 2018-02-05 RX ADMIN — DEXTROSE AND SODIUM CHLORIDE 30 ML/HR: 5; 450 INJECTION, SOLUTION INTRAVENOUS at 11:39

## 2018-02-05 RX ADMIN — PROPOFOL 50 MG: 10 INJECTION, EMULSION INTRAVENOUS at 12:39

## 2018-02-05 RX ADMIN — PROPOFOL 50 MG: 10 INJECTION, EMULSION INTRAVENOUS at 12:27

## 2018-02-05 RX ADMIN — PROPOFOL 50 MG: 10 INJECTION, EMULSION INTRAVENOUS at 12:30

## 2018-02-05 RX ADMIN — PROPOFOL 50 MG: 10 INJECTION, EMULSION INTRAVENOUS at 12:32

## 2018-02-05 RX ADMIN — PROPOFOL 50 MG: 10 INJECTION, EMULSION INTRAVENOUS at 12:37

## 2018-02-05 RX ADMIN — PROPOFOL 80 MG: 10 INJECTION, EMULSION INTRAVENOUS at 12:21

## 2018-02-05 RX ADMIN — PROPOFOL 70 MG: 10 INJECTION, EMULSION INTRAVENOUS at 12:22

## 2018-02-05 RX ADMIN — PROPOFOL 50 MG: 10 INJECTION, EMULSION INTRAVENOUS at 12:25

## 2018-02-05 NOTE — ANESTHESIA POSTPROCEDURE EVALUATION
Patient: Stephania Judd    Procedure Summary     Date Anesthesia Start Anesthesia Stop Room / Location    02/05/18 1218 1245 [facility] ENDOSCOPY 3 / [facility] ENDOSCOPY       Procedure Diagnosis Surgeon Provider    ESOPHAGOGASTRODUODENOSCOPY (N/A Esophagus); COLONOSCOPY (N/A ) Nausea; Epigastric pain; Encounter for screening for malignant neoplasm of colon  (Nausea [R11.0]; Epigastric pain [R10.13]; Encounter for screening for malignant neoplasm of colon [Z12.11]) MD Alanna Motta CRNA          Anesthesia Type: MAC  Last vitals  BP   167/78 (02/05/18 1130)   Temp   97.3 °F (36.3 °C) (02/05/18 1130)   Pulse   109 (02/05/18 1130)   Resp   18 (02/05/18 1130)     SpO2   98 % (02/05/18 1130)     Post Anesthesia Care and Evaluation    Patient location during evaluation: bedside  Patient participation: complete - patient participated  Level of consciousness: responsive to verbal stimuli  Pain management: adequate  Airway patency: patent  Anesthetic complications: No anesthetic complications    Cardiovascular status: acceptable  Respiratory status: acceptable  Hydration status: acceptable

## 2018-02-05 NOTE — ANESTHESIA PREPROCEDURE EVALUATION
Anesthesia Evaluation       NPO Liquid Status: > 6 hours     Airway   Mallampati: III  TM distance: <3 FB  Neck ROM: full  difficult intubation highly probable  Dental - normal exam     Pulmonary - normal exam   Cardiovascular - normal exam        Neuro/Psych  GI/Hepatic/Renal/Endo      Musculoskeletal     Abdominal   (+) obese,    Substance History      OB/GYN          Other                                                Anesthesia Plan    ASA 3     MAC     intravenous induction   Anesthetic plan and risks discussed with patient.

## 2018-02-05 NOTE — PLAN OF CARE
Problem: Patient Care Overview (Adult)  Goal: Plan of Care Review  Outcome: Outcome(s) achieved Date Met: 02/05/18 02/05/18 1256   Coping/Psychosocial Response Interventions   Plan Of Care Reviewed With patient   Patient Care Overview   Progress no change   Outcome Evaluation   Outcome Summary/Follow up Plan vss       Problem: GI Endoscopy (Adult)  Goal: Signs and Symptoms of Listed Potential Problems Will be Absent or Manageable (GI Endoscopy)  Outcome: Outcome(s) achieved Date Met: 02/05/18 02/05/18 1256   GI Endoscopy   Problems Assessed (GI Endoscopy) all   Problems Present (GI Endoscopy) none

## 2018-02-05 NOTE — PLAN OF CARE
Problem: Patient Care Overview (Adult)  Goal: Plan of Care Review  Outcome: Ongoing (interventions implemented as appropriate)   02/05/18 1242   Coping/Psychosocial Response Interventions   Plan Of Care Reviewed With patient   Patient Care Overview   Progress no change   Outcome Evaluation   Outcome Summary/Follow up Plan vss       Problem: GI Endoscopy (Adult)  Goal: Signs and Symptoms of Listed Potential Problems Will be Absent or Manageable (GI Endoscopy)  Outcome: Ongoing (interventions implemented as appropriate)   02/05/18 1242   GI Endoscopy   Problems Assessed (GI Endoscopy) all   Problems Present (GI Endoscopy) none

## 2018-02-05 NOTE — H&P
Progress Notes  Encounter Date:2/5/2018  Josesito yap  Gastroenterology   Expand All Collapse All    []Hide copied text  []Juliocesarver for attribution information      Chief Complaint   Patient presents with   • Abdominal Pain            Subjective        Stephania Judd is a 77 y.o. female. she is here today To discuss abdominal pain.     Abdominal Pain   This is a recurrent problem. The current episode started more than 1 month ago. The onset quality is gradual. The problem occurs daily. The problem has been gradually worsening. The pain is located in the epigastric region. The quality of the pain is burning and sharp (like needles in my stomach ). Associated symptoms include belching (indigestion ) and constipation (about once a week ). Pertinent negatives include no anorexia, arthralgias, diarrhea, fever, flatus, frequency, hematochezia, melena, myalgias, nausea or vomiting. The pain is aggravated by eating. Relieved by: bland diet helps. Her past medical history is significant for GERD.   77-year-old female presents to discuss abdominal pain, constipation and nausea.  States she had similar symptoms about 30 years ago was diagnosed with gastric ulcers.  States her most recent scopes were done early 2000's.  Do not have those results available.  She thinks it was when she was living in [facility].  She reports indigestion/epigastric pain occurs frequently and worsen around mealtimes.  Reports nausea denies any vomiting.  She is lost 22 pounds since June of last year however states in November early December she had flu and has not fully regained her appetite or strength back since that time.  She also would like to discuss constipation states her bowel movements about once a week and she does strain.  She has seen some very dark stool at times.  Plans: We'll start patient on MiraLAX daily for constipation, schedule patient for EGD due to epigastric pain, history of gastric ulcers.  Schedule patient  for screening colonoscopy.  Follow-up after test return office sooner if needed.            The following portions of the patient's history were reviewed and updated as appropriate:    Medical History         Past Medical History:   Diagnosis Date   • Bronchitis     • Chest pain     • CHF (congestive heart failure)     • GERD (gastroesophageal reflux disease)     • Hyperlipidemia     • Hypertension     • Sleep apnea            Surgical History    Past Surgical History:   Procedure Laterality Date   • APPENDECTOMY       • BACK SURGERY       • COLONOSCOPY       • HYSTERECTOMY       • TONSILLECTOMY                   Family History   Problem Relation Age of Onset   • Heart disease Mother     • Hypertension Mother     • Hypertension Sister     • Diabetes Sister     • Heart disease Brother     • Hypertension Brother     • Diabetes Brother     • Heart disease Brother     • Hypertension Brother     • Diabetes Brother            OB History      No data available           Current Medications    Current Outpatient Prescriptions   Medication Sig Dispense Refill   • acetaminophen (TYLENOL) 650 MG 8 hr tablet Take 650 mg by mouth Every 8 (Eight) Hours As Needed for Mild Pain (1-3).       • albuterol (VENTOLIN HFA) 108 (90 BASE) MCG/ACT inhaler 2 puffs every 4 hours as needed for breathing 1 inhaler 5   • aspirin 81 MG chewable tablet Chew 81 mg Daily.       • bumetanide (BUMEX) 1 MG tablet Take 1 tablet by mouth Daily.       • diltiaZEM (TAZTIA XT) 360 MG 24 hr capsule Take 360 mg by mouth Daily.       • DULoxetine (CYMBALTA) 60 MG capsule TAKE 1 CAPSULE EVERY MORNING   6   • gabapentin (NEURONTIN) 100 MG capsule         • hydrochlorothiazide (HYDRODIURIL) 25 MG tablet Take 25 mg by mouth Daily.       • HYDROcodone-acetaminophen (NORCO)  MG per tablet         • isosorbide mononitrate (IMDUR) 30 MG 24 hr tablet Take 30 mg by mouth Daily.       • lansoprazole (PREVACID) 30 MG capsule TAKE 1 CAPSULE EVERY MORNING BEFORE  BREAKFAST   5   • lidocaine-prilocaine (EMLA) 2.5-2.5 % cream         • lisinopril (PRINIVIL,ZESTRIL) 20 MG tablet Take 1 tablet by mouth Daily.       • metoprolol tartrate (LOPRESSOR) 25 MG tablet Take 25 mg by mouth 2 (Two) Times a Day.       • potassium chloride (K-DUR) 10 MEQ CR tablet TAKE 1 TABLET BY MOUTH EVERY DAY 30 tablet 0   • pramipexole (MIRAPEX) 0.25 MG tablet Take 0.25 mg by mouth Every Night.       • pravastatin (PRAVACHOL) 20 MG tablet TAKE 1 TABLET BY MOUTH EVERY EVENING 30 tablet 6   • traZODone (DESYREL) 150 MG tablet TAKE 1 TABLET BY MOUTH AT BEDTIME   5   • polyethylene glycol (GoLYTELY) 236 g solution Starting on day prior to procedure, drink 8 ounces every 30 minutes-half gallon repeat next morning at specified time until gone 4000 mL 0   • polyethylene glycol (MIRALAX) powder powder Take 34 g by mouth Daily. One scoop dissolved into 8 ounces of liquid daily by mouth. 850 g 3      No current facility-administered medications for this visit.               Allergies   Allergen Reactions   • Morphine And Related Nausea And Vomiting       Social History    Social History            Social History   • Marital status:        Spouse name: N/A   • Number of children: N/A   • Years of education: N/A           Social History Main Topics   • Smoking status: Former Smoker   • Smokeless tobacco: Never Used   • Alcohol use No   • Drug use: No   • Sexual activity: Defer           Other Topics Concern   • None      Social History Narrative            Review of Systems  Review of Systems   Constitutional: Positive for appetite change, fatigue and unexpected weight change (down 22pounds states she recently had flu early December ). Negative for activity change, chills, diaphoresis and fever.   HENT: Positive for trouble swallowing (intermittent tightening ). Negative for sore throat.    Respiratory: Negative for shortness of breath.    Gastrointestinal: Positive for abdominal pain and constipation  "(about once a week ). Negative for abdominal distention, anal bleeding, anorexia, blood in stool, diarrhea, flatus, hematochezia, melena, nausea, rectal pain and vomiting.   Genitourinary: Negative for frequency.   Musculoskeletal: Negative for arthralgias and myalgias.   Skin: Negative for pallor.   Neurological: Positive for weakness. Negative for light-headedness.                              /88 (BP Location: Left arm, Patient Position: Sitting, Cuff Size: Adult)  Pulse 103  Ht 162.6 cm (64.02\")  Wt 121 kg (267 lb 9.6 oz)  BMI 45.91 kg/m2        Objective        Physical Exam   Constitutional: She is oriented to person, place, and time. She appears well-developed and well-nourished. She is cooperative. No distress.   HENT:   Head: Normocephalic and atraumatic.   Neck: Normal range of motion. Neck supple. No thyromegaly present.   Cardiovascular: Normal rate, regular rhythm and normal heart sounds.    Pulmonary/Chest: Effort normal and breath sounds normal. She has no wheezes. She has no rhonchi. She has no rales.   Abdominal: Soft. Normal appearance and bowel sounds are normal. She exhibits no shifting dullness and no distension. There is no hepatosplenomegaly. There is tenderness in the epigastric area. There is no rigidity and no guarding. No hernia.   Lymphadenopathy:     She has no cervical adenopathy.   Neurological: She is alert and oriented to person, place, and time.   Skin: Skin is warm, dry and intact. No rash noted. No pallor.   Psychiatric: She has a normal mood and affect. Her speech is normal.              Appointment on 04/10/2017   Component Date Value Ref Range Status   •  CV STRESS PROTOCOL 1 04/11/2017 Pharmacologic    Final   • Stage 1 04/11/2017 1    Final   • Duration Min Stage 1 04/11/2017 0    Final   • Duration Sec Stage 1 04/11/2017 10    Final   • Stress Dose Regadenoson Stage 1 04/11/2017 0.4    Final   • Stress Comments Stage 1 04/11/2017 10 sec bolus injection    Final "   • Target HR (85%) 04/11/2017 124  bpm Final   • Max. Pred. HR (100%) 04/11/2017 146  bpm Final   • HR Stage 1 04/11/2017 56    Final   • BP Stage 1 04/11/2017 142/59    Final   • Baseline HR 04/11/2017 61  bpm Final   • Baseline BP 04/11/2017 142/59  mmHg Final   • Peak HR 04/11/2017 81  bpm Final   • Percent Max Pred HR 04/11/2017 55.48  % Final   • Percent Target HR 04/11/2017 65  % Final   • Peak BP 04/11/2017 148/62  mmHg Final   • Recovery HR 04/11/2017 69  bpm Final   • Recovery BP 04/11/2017 142/62  mmHg Final   • Estimated workload 04/11/2017 1.0  METS Final   • Nuclear Prior Study 04/11/2017 2    Final   • Nuc Stress EF 04/11/2017 68  % Final         Assessment/Plan           1. Epigastric pain    2. Nausea    3. Encounter for screening for malignant neoplasm of colon    .         Orders placed during this encounter include:        ESOPHAGOGASTRODUODENOSCOPY possible dilation  (N/A)     Review and/or summary of lab tests, radiology, procedures, medications. Review and summary of old records and obtaining of history. The risks and benefits of my recommendations, as well as other treatment options were discussed with the patient today. Questions were answered.          New Medications Ordered This Visit   Medications   • polyethylene glycol (MIRALAX) powder powder       Sig: Take 34 g by mouth Daily. One scoop dissolved into 8 ounces of liquid daily by mouth.       Dispense:  850 g       Refill:  3   • polyethylene glycol (GoLYTELY) 236 g solution       Sig: Starting on day prior to procedure, drink 8 ounces every 30 minutes-half gallon repeat next morning at specified time until gone       Dispense:  4000 mL       Refill:  0             This document has been electronically signed by Josesito Garcia MD on February 5, 2018 10:52 AM                     Results for orders placed or performed in visit on 04/10/17   Stress Test With Myocardial Perfusion - Two Day   Result Value Ref Range     BH CV STRESS  PROTOCOL 1 Pharmacologic       Stage 1 1       Duration Min Stage 1 0       Duration Sec Stage 1 10       Stress Dose Regadenoson Stage 1 0.4       Stress Comments Stage 1 10 sec bolus injection       Target HR (85%) 124 bpm     Max. Pred. HR (100%) 146 bpm     HR Stage 1 56       BP Stage 1 142/59       Baseline HR 61 bpm     Baseline /59 mmHg     Peak HR 81 bpm     Percent Max Pred HR 55.48 %     Percent Target HR 65 %     Peak /62 mmHg     Recovery HR 69 bpm     Recovery /62 mmHg     Estimated workload 1.0 METS     Nuclear Prior Study 2       Nuc Stress EF 68 %   Results for orders placed or performed in visit on 09/30/14   Urinalysis Without Microscopic   Result Value Ref Range     Color, UA YELLOW       Appearance CLEAR       Specific Gravity, UA 1.021 1.003 - 1.030     pH, UA 6.5 pH Units     Leukocytes, UA NEGATIVE NEGATIVE     Nitrite, UA NEGATIVE NEGATIVE     Protein, UA NEGATIVE NEGATIVE     Glucose, Urine NEGATIVE NEGATIVE mg/dl     Ketones, UA NEGATIVE NEGATIVE     Urobilinogen, UA 0.2 0.2 EU/dl     Blood, UA NEGATIVE NEGATIVE   Results for orders placed or performed during the hospital encounter of 08/30/14   proBNP   Result Value Ref Range     NT-proBNP 410 0 - 900 pg/ml   Urinalysis, Microscopic only   Result Value Ref Range     WBC, UA 20-30 (A) 0 - 5  /HPF     RBC, UA 0 0 - 2  /HPF     Bacteria, UA 2+ (A) 0  /HPF     Mucus, UA TRACE       Epithelial Cells, UA Rare Squamous  /HPF   CK MB   Result Value Ref Range     CKMB 0.5 0.0 - 5.0 ng/ml   CK MB   Result Value Ref Range     CKMB 0.4 0.0 - 5.0 ng/ml   CK MB   Result Value Ref Range     CKMB 0.5 0.0 - 5.0 ng/ml   Troponin   Result Value Ref Range     Troponin I <0.012 0.000 - 0.034 ng/ml   Troponin   Result Value Ref Range     Troponin I <0.012 0.000 - 0.034 ng/ml   Troponin   Result Value Ref Range     Troponin I <0.012 0.000 - 0.034 ng/ml   Urinalysis Without Microscopic   Result Value Ref Range     Color, UA YELLOW        Appearance CLEAR       Specific Gravity, UA 1.014 1.003 - 1.030     pH, UA 6.5 pH Units     Leukocytes, UA 1+ (A) NEGATIVE     Nitrite, UA NEGATIVE NEGATIVE     Protein, UA NEGATIVE NEGATIVE     Glucose, Urine NEGATIVE NEGATIVE mg/dl     Ketones, UA NEGATIVE NEGATIVE     Urobilinogen, UA 1.0 (A) 0.2 EU/dl     Blood, UA NEGATIVE NEGATIVE   APTT   Result Value Ref Range     PTT 26.0 22.5 - 36.7 seconds   Protime-INR   Result Value Ref Range     Protime 13.1 11.1 - 15.3 seconds     INR 1.0 0.0 - 3.5   D-dimer, quantitative   Result Value Ref Range     D-Dimer, Quant 881 (H) 0 - 470 ng/ml FEU   CBC and Differential   Result Value Ref Range     WBC 9.7 3.2 - 9.8 x1000/uL     RBC 4.46 3.77 - 5.16 jeffrey/mm3     Hemoglobin 12.9 12.0 - 15.5 gm/dl     Hematocrit 39.4 35.0 - 45.0 %     MCV 88.3 80.0 - 98.0 fl     MCH 28.9 26.0 - 34.0 pg     MCHC 32.7 31.4 - 36.0 gm/dl     RDW 13.9 11.5 - 14.5 %     Platelets 227 150 - 450 x1000/mm3     MPV 10.7 8.0 - 12.0 fl     Neutrophil Rel % 52.7 37.0 - 80.0 %     Lymphocyte Rel % 29.4 10.0 - 50.0 %     Monocyte Rel % 8.9 0.0 - 12.0 %     Eosinophil Rel % 8.4 (H) 0.0 - 7.0 %     Basophil Rel % 0.2 0.0 - 2.0 %     Immature Granulocyte Rel % 0.40 0.00 - 0.50 %     Neutrophils Absolute 5.13 2.00 - 8.60 x1000/uL     Lymphocytes Absolute 2.86 0.60 - 4.20 x1000/uL     Monocytes Absolute 0.87 0.00 - 0.90 x1000/uL     Eosinophils Absolute 0.82 (H) 0.00 - 0.70 x1000/uL     Basophils Absolute 0.02 0.00 - 0.20 x1000/uL     Immature Granulocytes Absolute 0.040 (H) 0.005 - 0.022 x1000/uL   CK   Result Value Ref Range     Creatine Kinase 41 30 - 135 U/L   CK   Result Value Ref Range     Creatine Kinase 31 30 - 135 U/L   CK   Result Value Ref Range     Creatine Kinase 39 30 - 135 U/L   Comprehensive metabolic panel   Result Value Ref Range     Sodium 137 137 - 145 mmol/L     Potassium 3.9 3.5 - 5.1 mmol/L     Chloride 99 95 - 110 mmol/L     CO2 25 22 - 31 mmol/L     Anion Gap 13.0 5.0 - 15.0 mmol/L      Glucose 98 60 - 100 mg/dl     BUN 19 7 - 21 mg/dl     Creatinine 0.9 0.5 - 1.0 mg/dl     GFR MDRD Non  62 39 - 90 mL/min/1.73 sq.M     GFR MDRD African American 75 39 - 90 mL/min/1.73 sq.M     Calcium 9.4 8.4 - 10.2 mg/dl     Total Protein 7.6 6.3 - 8.6 gm/dl     Albumin 4.4 3.4 - 4.8 gm/dl     Total Bilirubin 0.8 0.2 - 1.3 mg/dl     Alkaline Phosphatase 115 38 - 126 U/L     AST (SGOT) 29 14 - 36 U/L     ALT (SGPT) 18 9 - 52 U/L      *Note: Due to a large number of results and/or encounters for the requested time period, some results have not been displayed. A complete set of results can be found in Results Review.               Office Visit on 1/4/2018              Detailed Report

## 2018-02-06 LAB
LAB AP CASE REPORT: NORMAL
Lab: NORMAL
PATH REPORT.FINAL DX SPEC: NORMAL
PATH REPORT.GROSS SPEC: NORMAL

## 2018-02-19 ENCOUNTER — OFFICE VISIT (OUTPATIENT)
Dept: GASTROENTEROLOGY | Facility: CLINIC | Age: 78
End: 2018-02-19

## 2018-02-19 VITALS
SYSTOLIC BLOOD PRESSURE: 134 MMHG | BODY MASS INDEX: 45.89 KG/M2 | HEART RATE: 74 BPM | WEIGHT: 268.8 LBS | DIASTOLIC BLOOD PRESSURE: 78 MMHG | HEIGHT: 64 IN

## 2018-02-19 DIAGNOSIS — D12.3 BENIGN NEOPLASM OF TRANSVERSE COLON: ICD-10-CM

## 2018-02-19 DIAGNOSIS — K29.50 CHRONIC GASTRITIS WITHOUT BLEEDING, UNSPECIFIED GASTRITIS TYPE: ICD-10-CM

## 2018-02-19 DIAGNOSIS — D12.2 BENIGN NEOPLASM OF ASCENDING COLON: Primary | ICD-10-CM

## 2018-02-19 PROCEDURE — 99214 OFFICE O/P EST MOD 30 MIN: CPT | Performed by: NURSE PRACTITIONER

## 2018-02-19 RX ORDER — PANTOPRAZOLE SODIUM 40 MG/1
40 TABLET, DELAYED RELEASE ORAL DAILY
Qty: 90 TABLET | Refills: 3 | Status: SHIPPED | OUTPATIENT
Start: 2018-02-19

## 2018-02-19 NOTE — PROGRESS NOTES
Chief Complaint   Patient presents with   • Colonoscopy     results   • EGD     results       Subjective    Stephania Judd is a 77 y.o. female. she is here today for follow up.   Abdominal Pain   This is a recurrent problem. The current episode started more than 1 month ago. The onset quality is gradual. The problem occurs daily. The problem has been gradually worsening. The pain is located in the epigastric region. The quality of the pain is burning and sharp (like needles in my stomach ). Associated symptoms include belching (indigestion ) and constipation (about once a week ). Pertinent negatives include no anorexia, arthralgias, diarrhea, fever, flatus, frequency, hematochezia, melena, myalgias, nausea or vomiting. The pain is aggravated by eating. Relieved by: bland diet helps. Her past medical history is significant for GERD.   77-year-old female presents For EGD and colonoscopy follow-up.  States she does not feel like her parasite has been helping reflux symptoms very much.  She generally avoiding spicy greasy foods.  States her appetite has been much better she is up 7 pounds from last check.  States her bowel movements are still only about once a week without use of laxative take she has been trying MiraLAX.  Her primary care provider [facility] Metamucil however she has not yet tried it.  She denies any nausea vomiting.  EGD noted gastritis normal esophagus and normal duodenum.  Antrum biopsy noted reactive gastropathy.  Duodenal biopsy noted no significant abnormalities.  Colonoscopy had a fair prep and noted external and internal hemorrhoids.  4 small polyps removed from transverse and descending colon.  Resection retrieval were complete.  Ascending colon polyp was tubular adenoma.  Transverse colon polyp was also tubular adenoma.  Plan; we'll discontinue Prevacid and start patient on Protonix.  Have recommended to start Linzess for chronic constipation however patient will prefer to stay on  MiraLAX and try Metamucil.  She will need repeat colonoscopy in 3 years for surveillance.  Follow-up in 6 months return to office sooner if needed.    The following portions of the patient's history were reviewed and updated as appropriate:   Past Medical History:   Diagnosis Date   • Bronchitis    • Chest pain    • CHF (congestive heart failure)    • GERD (gastroesophageal reflux disease)    • Hyperlipidemia    • Hypertension    • Sleep apnea      Past Surgical History:   Procedure Laterality Date   • BACK SURGERY     • BREAST LUMPECTOMY     • COLONOSCOPY     • COLONOSCOPY N/A 2/5/2018    Procedure: COLONOSCOPY;  Surgeon: Josesito Garcia MD;  Location: [facility] ENDOSCOPY;  Service:    • ENDOSCOPY N/A 2/5/2018    Procedure: ESOPHAGOGASTRODUODENOSCOPY;  Surgeon: Josesito Garcia MD;  Location: [facility] ENDOSCOPY;  Service:    • HYSTERECTOMY     • TONSILLECTOMY       Family History   Problem Relation Age of Onset   • Heart disease Mother    • Hypertension Mother    • Hypertension Sister    • Diabetes Sister    • Heart disease Brother    • Hypertension Brother    • Diabetes Brother    • Heart disease Brother    • Hypertension Brother    • Diabetes Brother      OB History     No data available        Current Outpatient Prescriptions   Medication Sig Dispense Refill   • acetaminophen (TYLENOL) 650 MG 8 hr tablet Take 650 mg by mouth Every 8 (Eight) Hours As Needed for Mild Pain (1-3).     • albuterol (VENTOLIN HFA) 108 (90 BASE) MCG/ACT inhaler 2 puffs every 4 hours as needed for breathing 1 inhaler 5   • aspirin 81 MG chewable tablet Chew 81 mg Daily.     • bumetanide (BUMEX) 1 MG tablet Take 1 tablet by mouth Daily.     • diltiaZEM (TAZTIA XT) 360 MG 24 hr capsule Take 360 mg by mouth Daily.     • DULoxetine (CYMBALTA) 60 MG capsule Take 60 mg by mouth Daily.     • gabapentin (NEURONTIN) 100 MG capsule Take 100 mg by mouth 2 (Two) Times a Day.     • hydrochlorothiazide (HYDRODIURIL) 25 MG tablet Take 25 mg by mouth Daily.      • HYDROcodone-acetaminophen (NORCO)  MG per tablet Take 1 tablet by mouth Every 8 (Eight) Hours As Needed.     • isosorbide mononitrate (IMDUR) 30 MG 24 hr tablet Take 30 mg by mouth Daily.     • lisinopril (PRINIVIL,ZESTRIL) 20 MG tablet Take 1 tablet by mouth Daily.     • metoprolol tartrate (LOPRESSOR) 25 MG tablet Take 25 mg by mouth 2 (Two) Times a Day.     • polyethylene glycol (MIRALAX) powder powder Take 34 g by mouth Daily. One scoop dissolved into 8 ounces of liquid daily by mouth. 850 g 3   • potassium chloride (K-DUR) 10 MEQ CR tablet Take 10 mEq by mouth Daily.     • pramipexole (MIRAPEX) 0.25 MG tablet Take 0.25 mg by mouth Every Night.     • pravastatin (PRAVACHOL) 20 MG tablet Take 20 mg by mouth Daily.     • traZODone (DESYREL) 150 MG tablet Take 150 mg by mouth Every Night.     • pantoprazole (PROTONIX) 40 MG EC tablet Take 1 tablet by mouth Daily. 90 tablet 3     No current facility-administered medications for this visit.      Allergies   Allergen Reactions   • Morphine And Related Nausea And Vomiting     Social History     Social History   • Marital status: Single     Spouse name: N/A   • Number of children: N/A   • Years of education: N/A     Social History Main Topics   • Smoking status: Former Smoker   • Smokeless tobacco: Never Used      Comment: quit 50 + years ago    • Alcohol use No   • Drug use: No   • Sexual activity: Defer     Other Topics Concern   • None     Social History Narrative       Review of Systems  Review of Systems   Constitutional: Positive for appetite change (much better appetite recently.) and fatigue. Negative for activity change, chills, diaphoresis, fever and unexpected weight change.   HENT: Negative for sore throat and trouble swallowing.    Respiratory: Negative for shortness of breath.    Gastrointestinal: Positive for abdominal pain and constipation (about once a week ). Negative for abdominal distention, anal bleeding, anorexia, blood in stool,  "diarrhea, flatus, hematochezia, melena, nausea, rectal pain and vomiting.   Genitourinary: Negative for frequency.   Musculoskeletal: Negative for arthralgias and myalgias.   Skin: Negative for pallor.   Neurological: Negative for light-headedness.        /78 (BP Location: Left arm, Patient Position: Sitting, Cuff Size: Adult)  Pulse 74  Ht 162.6 cm (64.02\")  Wt 122 kg (268 lb 12.8 oz)  BMI 46.12 kg/m2    Objective    Physical Exam   Constitutional: She is oriented to person, place, and time. She appears well-developed and well-nourished. She is cooperative. No distress.   HENT:   Head: Normocephalic and atraumatic.   Neck: Normal range of motion. Neck supple. No thyromegaly present.   Cardiovascular: Normal rate, regular rhythm and normal heart sounds.    Pulmonary/Chest: Effort normal and breath sounds normal. She has no wheezes. She has no rhonchi. She has no rales.   Abdominal: Soft. Normal appearance and bowel sounds are normal. She exhibits no shifting dullness and no distension. There is no hepatosplenomegaly. There is no tenderness. There is no rigidity and no guarding. No hernia.   Lymphadenopathy:     She has no cervical adenopathy.   Neurological: She is alert and oriented to person, place, and time.   Skin: Skin is warm, dry and intact. No rash noted. No pallor.   Psychiatric: She has a normal mood and affect. Her speech is normal.     Admission on 02/05/2018, Discharged on 02/05/2018   Component Date Value Ref Range Status   • Case Report 02/05/2018    Final                    Value:Surgical Pathology Report                         Case: [id number]                                  Authorizing Provider:  Josesito Garcia MD         Collected:           02/05/2018 12:26 PM          Ordering Location:     [facility]             Received:            02/05/2018 02:26 PM                                 [place] [facility]                                                     Pathologist:       "     Johnathan Bridges MD                                                          Specimens:   1) - Gastric, Antrum                                                                                2) - Small Intestine, Duodenum                                                                      3) - Large Intestine, Right / Ascending Colon, polyp                                                4) - Large Intestine, Transverse Colon, polyps                                            • Final Diagnosis 02/05/2018    Final                    Value:This result contains rich text formatting which cannot be displayed here.   • Gross Description 02/05/2018    Final                    Value:This result contains rich text formatting which cannot be displayed here.     Assessment/Plan      1. Benign neoplasm of ascending colon    2. Benign neoplasm of transverse colon    3. Chronic gastritis without bleeding, unspecified gastritis type    .       Orders placed during this encounter include:      * Surgery not found *    Review and/or summary of lab tests, radiology, procedures, medications. Review and summary of old records and obtaining of history. The risks and benefits of my recommendations, as well as other treatment options were discussed with the patient today. Questions were answered.    New Medications Ordered This Visit   Medications   • pantoprazole (PROTONIX) 40 MG EC tablet     Sig: Take 1 tablet by mouth Daily.     Dispense:  90 tablet     Refill:  3       Follow-up: Return in about 6 months (around 8/19/2018) for Recheck Gerd, Constipation.          This document has been electronically signed by MARLENI Blackman on February 19, 2018 11:40 AM             Results for orders placed or performed during the hospital encounter of 02/05/18   Tissue Pathology Exam - Tissue, Gastric, Antrum   Result Value Ref Range    Case Report       Surgical Pathology Report                         Case: [id number]                                   Authorizing Provider:  Josesito Garcia MD         Collected:           02/05/2018 12:26 PM          Ordering Location:     [facility]             Received:            02/05/2018 02:26 PM                                 [place] [facility]                                                     Pathologist:           Johnathan Bridges MD                                                          Specimens:   1) - Gastric, Antrum                                                                                2) - Small Intestine, Duodenum                                                                      3) - Large Intestine, Right / Ascending Colon, polyp                                                4) - Large Intestine, Transverse Colon, polyps                                             Final Diagnosis       1.  MUCOSA, ANTRUM OF STOMACH:   REACTIVE GASTROPATHY.     2.  MUCOSA, DUODENUM:   NO SIGNIFICANT HISTOLOGIC ABNORMALITY.     3.  POLYP, ASCENDING COLON:   TUBULAR ADENOMA.     4.  POLYPS, TRANSVERSE COLON:   TUBULAR ADENOMA.       Gross Description       Received for examination are 4 containers, 3 of which (1, 2, 3) have nodular bits of white soft tissue measuring 0.3-0.5 cc in aggregate.  The fourth container (4) has multiple nodular fragments of white soft tissue measuring 1.0 cc in aggregate and 0.6 cm in greatest dimension.  All specimens are embedded as labeled:  1A antrum of stomach; 2A duodenum; 3A polyp, ascending colon; 4A polyps, transverse colon.         Embedded Images     Results for orders placed or performed in visit on 04/10/17   Stress Test With Myocardial Perfusion - Two Day   Result Value Ref Range    BH CV STRESS PROTOCOL 1 Pharmacologic     Stage 1 1     Duration Min Stage 1 0     Duration Sec Stage 1 10     Stress Dose Regadenoson Stage 1 0.4     Stress Comments Stage 1 10 sec bolus injection     Target HR (85%) 124 bpm    Max. Pred. HR (100%) 146 bpm    HR Stage 1 56      BP Stage 1 142/59     Baseline HR 61 bpm    Baseline /59 mmHg    Peak HR 81 bpm    Percent Max Pred HR 55.48 %    Percent Target HR 65 %    Peak /62 mmHg    Recovery HR 69 bpm    Recovery /62 mmHg    Estimated workload 1.0 METS    Nuclear Prior Study 2     Nuc Stress EF 68 %   Results for orders placed or performed in visit on 09/30/14   Urinalysis Without Microscopic   Result Value Ref Range    Color, UA YELLOW     Appearance CLEAR     Specific Gravity, UA 1.021 1.003 - 1.030    pH, UA 6.5 pH Units    Leukocytes, UA NEGATIVE NEGATIVE    Nitrite, UA NEGATIVE NEGATIVE    Protein, UA NEGATIVE NEGATIVE    Glucose, Urine NEGATIVE NEGATIVE mg/dl    Ketones, UA NEGATIVE NEGATIVE    Urobilinogen, UA 0.2 0.2 EU/dl    Blood, UA NEGATIVE NEGATIVE   Results for orders placed or performed during the hospital encounter of 08/30/14   proBNP   Result Value Ref Range    NT-proBNP 410 0 - 900 pg/ml   Urinalysis, Microscopic only   Result Value Ref Range    WBC, UA 20-30 (A) 0 - 5  /HPF    RBC, UA 0 0 - 2  /HPF    Bacteria, UA 2+ (A) 0  /HPF    Mucus, UA TRACE     Epithelial Cells, UA Rare Squamous  /HPF   CK MB   Result Value Ref Range    CKMB 0.5 0.0 - 5.0 ng/ml   CK MB   Result Value Ref Range    CKMB 0.4 0.0 - 5.0 ng/ml   CK MB   Result Value Ref Range    CKMB 0.5 0.0 - 5.0 ng/ml   Troponin   Result Value Ref Range    Troponin I <0.012 0.000 - 0.034 ng/ml   Troponin   Result Value Ref Range    Troponin I <0.012 0.000 - 0.034 ng/ml   Troponin   Result Value Ref Range    Troponin I <0.012 0.000 - 0.034 ng/ml   Urinalysis Without Microscopic   Result Value Ref Range    Color, UA YELLOW     Appearance CLEAR     Specific Gravity, UA 1.014 1.003 - 1.030    pH, UA 6.5 pH Units    Leukocytes, UA 1+ (A) NEGATIVE    Nitrite, UA NEGATIVE NEGATIVE    Protein, UA NEGATIVE NEGATIVE    Glucose, Urine NEGATIVE NEGATIVE mg/dl    Ketones, UA NEGATIVE NEGATIVE    Urobilinogen, UA 1.0 (A) 0.2 EU/dl    Blood, UA NEGATIVE NEGATIVE    APTT   Result Value Ref Range    PTT 26.0 22.5 - 36.7 seconds   Protime-INR   Result Value Ref Range    Protime 13.1 11.1 - 15.3 seconds    INR 1.0 0.0 - 3.5   D-dimer, quantitative   Result Value Ref Range    D-Dimer, Quant 881 (H) 0 - 470 ng/ml FEU   CBC and Differential   Result Value Ref Range    WBC 9.7 3.2 - 9.8 x1000/uL    RBC 4.46 3.77 - 5.16 jeffrey/mm3    Hemoglobin 12.9 12.0 - 15.5 gm/dl    Hematocrit 39.4 35.0 - 45.0 %    MCV 88.3 80.0 - 98.0 fl    MCH 28.9 26.0 - 34.0 pg    MCHC 32.7 31.4 - 36.0 gm/dl    RDW 13.9 11.5 - 14.5 %    Platelets 227 150 - 450 x1000/mm3    MPV 10.7 8.0 - 12.0 fl    Neutrophil Rel % 52.7 37.0 - 80.0 %    Lymphocyte Rel % 29.4 10.0 - 50.0 %    Monocyte Rel % 8.9 0.0 - 12.0 %    Eosinophil Rel % 8.4 (H) 0.0 - 7.0 %    Basophil Rel % 0.2 0.0 - 2.0 %    Immature Granulocyte Rel % 0.40 0.00 - 0.50 %    Neutrophils Absolute 5.13 2.00 - 8.60 x1000/uL    Lymphocytes Absolute 2.86 0.60 - 4.20 x1000/uL    Monocytes Absolute 0.87 0.00 - 0.90 x1000/uL    Eosinophils Absolute 0.82 (H) 0.00 - 0.70 x1000/uL    Basophils Absolute 0.02 0.00 - 0.20 x1000/uL    Immature Granulocytes Absolute 0.040 (H) 0.005 - 0.022 x1000/uL   CK   Result Value Ref Range    Creatine Kinase 41 30 - 135 U/L   CK   Result Value Ref Range    Creatine Kinase 31 30 - 135 U/L   CK   Result Value Ref Range    Creatine Kinase 39 30 - 135 U/L   Comprehensive metabolic panel   Result Value Ref Range    Sodium 137 137 - 145 mmol/L    Potassium 3.9 3.5 - 5.1 mmol/L    Chloride 99 95 - 110 mmol/L    CO2 25 22 - 31 mmol/L    Anion Gap 13.0 5.0 - 15.0 mmol/L    Glucose 98 60 - 100 mg/dl    BUN 19 7 - 21 mg/dl    Creatinine 0.9 0.5 - 1.0 mg/dl    GFR MDRD Non  62 39 - 90 mL/min/1.73 sq.M    GFR MDRD African American 75 39 - 90 mL/min/1.73 sq.M    Calcium 9.4 8.4 - 10.2 mg/dl    Total Protein 7.6 6.3 - 8.6 gm/dl    Albumin 4.4 3.4 - 4.8 gm/dl    Total Bilirubin 0.8 0.2 - 1.3 mg/dl    Alkaline Phosphatase 115 38 - 126  U/L    AST (SGOT) 29 14 - 36 U/L    ALT (SGPT) 18 9 - 52 U/L     *Note: Due to a large number of results and/or encounters for the requested time period, some results have not been displayed. A complete set of results can be found in Results Review.

## 2018-08-06 ENCOUNTER — APPOINTMENT (OUTPATIENT)
Dept: GENERAL RADIOLOGY | Facility: HOSPITAL | Age: 78
End: 2018-08-06

## 2018-08-06 ENCOUNTER — HOSPITAL ENCOUNTER (OUTPATIENT)
Facility: HOSPITAL | Age: 78
Setting detail: OBSERVATION
Discharge: HOME | End: 2018-08-07
Attending: EMERGENCY MEDICINE | Admitting: EMERGENCY MEDICINE

## 2018-08-06 DIAGNOSIS — Z12.11 ENCOUNTER FOR SCREENING FOR MALIGNANT NEOPLASM OF COLON: ICD-10-CM

## 2018-08-06 DIAGNOSIS — E66.01 MORBID OBESITY DUE TO EXCESS CALORIES (HCC): ICD-10-CM

## 2018-08-06 DIAGNOSIS — R07.9 CHEST PAIN, UNSPECIFIED TYPE: Primary | ICD-10-CM

## 2018-08-06 DIAGNOSIS — R11.0 NAUSEA: ICD-10-CM

## 2018-08-06 DIAGNOSIS — I10 ESSENTIAL HYPERTENSION: ICD-10-CM

## 2018-08-06 DIAGNOSIS — R10.13 EPIGASTRIC PAIN: ICD-10-CM

## 2018-08-06 DIAGNOSIS — R07.2 PRECORDIAL PAIN: ICD-10-CM

## 2018-08-06 DIAGNOSIS — D64.9 ANEMIA, UNSPECIFIED TYPE: ICD-10-CM

## 2018-08-06 DIAGNOSIS — E78.5 HYPERLIPIDEMIA, UNSPECIFIED HYPERLIPIDEMIA TYPE: ICD-10-CM

## 2018-08-06 LAB
ALBUMIN SERPL-MCNC: 4 G/DL (ref 3.4–4.8)
ALBUMIN/GLOB SERPL: 1.3 G/DL (ref 1.1–1.8)
ALP SERPL-CCNC: 87 U/L (ref 38–126)
ALT SERPL W P-5'-P-CCNC: 18 U/L (ref 9–52)
ANION GAP SERPL CALCULATED.3IONS-SCNC: 10 MMOL/L (ref 5–15)
AST SERPL-CCNC: 19 U/L (ref 14–36)
BASOPHILS # BLD AUTO: 0.03 10*3/MM3 (ref 0–0.2)
BASOPHILS NFR BLD AUTO: 0.3 % (ref 0–2)
BILIRUB SERPL-MCNC: 0.4 MG/DL (ref 0.2–1.3)
BUN BLD-MCNC: 19 MG/DL (ref 7–21)
BUN/CREAT SERPL: 21.8 (ref 7–25)
CALCIUM SPEC-SCNC: 9 MG/DL (ref 8.4–10.2)
CHLORIDE SERPL-SCNC: 102 MMOL/L (ref 95–110)
CO2 SERPL-SCNC: 25 MMOL/L (ref 22–31)
CREAT BLD-MCNC: 0.87 MG/DL (ref 0.5–1)
DEPRECATED RDW RBC AUTO: 52.9 FL (ref 36.4–46.3)
EOSINOPHIL # BLD AUTO: 0.67 10*3/MM3 (ref 0–0.7)
EOSINOPHIL NFR BLD AUTO: 5.8 % (ref 0–7)
ERYTHROCYTE [DISTWIDTH] IN BLOOD BY AUTOMATED COUNT: 17.5 % (ref 11.5–14.5)
GFR SERPL CREATININE-BSD FRML MDRD: 63 ML/MIN/1.73 (ref 39–90)
GLOBULIN UR ELPH-MCNC: 3.2 GM/DL (ref 2.3–3.5)
GLUCOSE BLD-MCNC: 87 MG/DL (ref 60–100)
HCT VFR BLD AUTO: 30.9 % (ref 35–45)
HGB BLD-MCNC: 9.2 G/DL (ref 12–15.5)
HOLD SPECIMEN: NORMAL
HOLD SPECIMEN: NORMAL
IMM GRANULOCYTES # BLD: 0.03 10*3/MM3 (ref 0–0.02)
IMM GRANULOCYTES NFR BLD: 0.3 % (ref 0–0.5)
INR PPP: 1.05 (ref 0.8–1.2)
LYMPHOCYTES # BLD AUTO: 2.42 10*3/MM3 (ref 0.6–4.2)
LYMPHOCYTES NFR BLD AUTO: 20.9 % (ref 10–50)
MCH RBC QN AUTO: 24.5 PG (ref 26.5–34)
MCHC RBC AUTO-ENTMCNC: 29.8 G/DL (ref 31.4–36)
MCV RBC AUTO: 82.2 FL (ref 80–98)
MONOCYTES # BLD AUTO: 1.28 10*3/MM3 (ref 0–0.9)
MONOCYTES NFR BLD AUTO: 11.1 % (ref 0–12)
NEUTROPHILS # BLD AUTO: 7.15 10*3/MM3 (ref 2–8.6)
NEUTROPHILS NFR BLD AUTO: 61.6 % (ref 37–80)
NT-PROBNP SERPL-MCNC: 209 PG/ML (ref 0–900)
PLATELET # BLD AUTO: 338 10*3/MM3 (ref 150–450)
PMV BLD AUTO: 9.4 FL (ref 8–12)
POTASSIUM BLD-SCNC: 3.9 MMOL/L (ref 3.5–5.1)
PROT SERPL-MCNC: 7.2 G/DL (ref 6.3–8.6)
PROTHROMBIN TIME: 13.5 SECONDS (ref 11.1–15.3)
RBC # BLD AUTO: 3.76 10*6/MM3 (ref 3.77–5.16)
SODIUM BLD-SCNC: 137 MMOL/L (ref 137–145)
TROPONIN I SERPL-MCNC: <0.012 NG/ML
TROPONIN I SERPL-MCNC: <0.012 NG/ML
WBC NRBC COR # BLD: 11.58 10*3/MM3 (ref 3.2–9.8)
WHOLE BLOOD HOLD SPECIMEN: NORMAL
WHOLE BLOOD HOLD SPECIMEN: NORMAL

## 2018-08-06 PROCEDURE — 84484 ASSAY OF TROPONIN QUANT: CPT | Performed by: PHYSICIAN ASSISTANT

## 2018-08-06 PROCEDURE — 85610 PROTHROMBIN TIME: CPT | Performed by: PHYSICIAN ASSISTANT

## 2018-08-06 PROCEDURE — 85025 COMPLETE CBC W/AUTO DIFF WBC: CPT | Performed by: PHYSICIAN ASSISTANT

## 2018-08-06 PROCEDURE — 93005 ELECTROCARDIOGRAM TRACING: CPT | Performed by: EMERGENCY MEDICINE

## 2018-08-06 PROCEDURE — 71045 X-RAY EXAM CHEST 1 VIEW: CPT

## 2018-08-06 PROCEDURE — 83880 ASSAY OF NATRIURETIC PEPTIDE: CPT | Performed by: PHYSICIAN ASSISTANT

## 2018-08-06 PROCEDURE — G0378 HOSPITAL OBSERVATION PER HR: HCPCS

## 2018-08-06 PROCEDURE — 80053 COMPREHEN METABOLIC PANEL: CPT | Performed by: PHYSICIAN ASSISTANT

## 2018-08-06 PROCEDURE — 93010 ELECTROCARDIOGRAM REPORT: CPT | Performed by: INTERNAL MEDICINE

## 2018-08-06 PROCEDURE — 99285 EMERGENCY DEPT VISIT HI MDM: CPT

## 2018-08-06 PROCEDURE — 36415 COLL VENOUS BLD VENIPUNCTURE: CPT

## 2018-08-06 RX ORDER — ATORVASTATIN CALCIUM 10 MG/1
10 TABLET, FILM COATED ORAL DAILY
Status: DISCONTINUED | OUTPATIENT
Start: 2018-08-07 | End: 2018-08-07 | Stop reason: HOSPADM

## 2018-08-06 RX ORDER — ONDANSETRON 2 MG/ML
4 INJECTION INTRAMUSCULAR; INTRAVENOUS EVERY 6 HOURS PRN
Status: DISCONTINUED | OUTPATIENT
Start: 2018-08-06 | End: 2018-08-07 | Stop reason: HOSPADM

## 2018-08-06 RX ORDER — PANTOPRAZOLE SODIUM 40 MG/1
40 TABLET, DELAYED RELEASE ORAL DAILY
Status: DISCONTINUED | OUTPATIENT
Start: 2018-08-07 | End: 2018-08-07 | Stop reason: HOSPADM

## 2018-08-06 RX ORDER — LISINOPRIL 40 MG/1
40 TABLET ORAL DAILY
Status: DISCONTINUED | OUTPATIENT
Start: 2018-08-07 | End: 2018-08-07 | Stop reason: HOSPADM

## 2018-08-06 RX ORDER — HYDROCHLOROTHIAZIDE 25 MG/1
25 TABLET ORAL DAILY
Status: DISCONTINUED | OUTPATIENT
Start: 2018-08-07 | End: 2018-08-07 | Stop reason: HOSPADM

## 2018-08-06 RX ORDER — LATANOPROST 50 UG/ML
1 SOLUTION/ DROPS OPHTHALMIC NIGHTLY
COMMUNITY

## 2018-08-06 RX ORDER — ALBUTEROL SULFATE 2.5 MG/3ML
2.5 SOLUTION RESPIRATORY (INHALATION) EVERY 4 HOURS PRN
Status: DISCONTINUED | OUTPATIENT
Start: 2018-08-06 | End: 2018-08-07 | Stop reason: HOSPADM

## 2018-08-06 RX ORDER — ISOSORBIDE MONONITRATE 30 MG/1
30 TABLET, EXTENDED RELEASE ORAL DAILY
Status: DISCONTINUED | OUTPATIENT
Start: 2018-08-07 | End: 2018-08-07 | Stop reason: HOSPADM

## 2018-08-06 RX ORDER — SODIUM CHLORIDE 0.9 % (FLUSH) 0.9 %
10 SYRINGE (ML) INJECTION AS NEEDED
Status: DISCONTINUED | OUTPATIENT
Start: 2018-08-06 | End: 2018-08-07 | Stop reason: HOSPADM

## 2018-08-06 RX ORDER — ACETAMINOPHEN 325 MG/1
650 TABLET ORAL EVERY 4 HOURS PRN
Status: DISCONTINUED | OUTPATIENT
Start: 2018-08-06 | End: 2018-08-07 | Stop reason: HOSPADM

## 2018-08-06 RX ORDER — DILTIAZEM HYDROCHLORIDE 300 MG/1
300 CAPSULE, COATED, EXTENDED RELEASE ORAL
Status: DISCONTINUED | OUTPATIENT
Start: 2018-08-07 | End: 2018-08-07 | Stop reason: HOSPADM

## 2018-08-06 RX ORDER — GABAPENTIN 100 MG/1
200 CAPSULE ORAL EVERY 12 HOURS SCHEDULED
Status: DISCONTINUED | OUTPATIENT
Start: 2018-08-07 | End: 2018-08-07 | Stop reason: HOSPADM

## 2018-08-06 RX ORDER — GABAPENTIN 100 MG/1
100 CAPSULE ORAL EVERY 12 HOURS SCHEDULED
Status: DISCONTINUED | OUTPATIENT
Start: 2018-08-06 | End: 2018-08-06

## 2018-08-06 RX ORDER — MECLIZINE HCL 12.5 MG/1
12.5 TABLET ORAL EVERY 12 HOURS PRN
COMMUNITY

## 2018-08-06 RX ORDER — SUCRALFATE 1 G/1
1 TABLET ORAL
Status: DISCONTINUED | OUTPATIENT
Start: 2018-08-07 | End: 2018-08-07 | Stop reason: HOSPADM

## 2018-08-06 RX ORDER — HYDROCODONE BITARTRATE AND ACETAMINOPHEN 10; 325 MG/1; MG/1
1 TABLET ORAL ONCE
Status: COMPLETED | OUTPATIENT
Start: 2018-08-06 | End: 2018-08-06

## 2018-08-06 RX ORDER — ASPIRIN 81 MG/1
81 TABLET, CHEWABLE ORAL DAILY
Status: DISCONTINUED | OUTPATIENT
Start: 2018-08-07 | End: 2018-08-07 | Stop reason: HOSPADM

## 2018-08-06 RX ORDER — PRAMIPEXOLE DIHYDROCHLORIDE 0.25 MG/1
0.25 TABLET ORAL NIGHTLY
Status: DISCONTINUED | OUTPATIENT
Start: 2018-08-06 | End: 2018-08-07 | Stop reason: HOSPADM

## 2018-08-06 RX ORDER — BUMETANIDE 1 MG/1
1 TABLET ORAL DAILY
Status: DISCONTINUED | OUTPATIENT
Start: 2018-08-07 | End: 2018-08-07 | Stop reason: HOSPADM

## 2018-08-06 RX ORDER — LATANOPROST 50 UG/ML
1 SOLUTION/ DROPS OPHTHALMIC NIGHTLY
Status: DISCONTINUED | OUTPATIENT
Start: 2018-08-06 | End: 2018-08-07 | Stop reason: HOSPADM

## 2018-08-06 RX ORDER — LISINOPRIL 20 MG/1
20 TABLET ORAL DAILY
Status: DISCONTINUED | OUTPATIENT
Start: 2018-08-07 | End: 2018-08-06

## 2018-08-06 RX ORDER — TIZANIDINE 4 MG/1
2 TABLET ORAL 2 TIMES DAILY PRN
COMMUNITY

## 2018-08-06 RX ORDER — SODIUM CHLORIDE 0.9 % (FLUSH) 0.9 %
1-10 SYRINGE (ML) INJECTION AS NEEDED
Status: DISCONTINUED | OUTPATIENT
Start: 2018-08-06 | End: 2018-08-07 | Stop reason: HOSPADM

## 2018-08-06 RX ORDER — MECLIZINE HCL 12.5 MG/1
12.5 TABLET ORAL EVERY 12 HOURS PRN
Status: DISCONTINUED | OUTPATIENT
Start: 2018-08-06 | End: 2018-08-07 | Stop reason: HOSPADM

## 2018-08-06 RX ORDER — HYDRALAZINE HYDROCHLORIDE 20 MG/ML
10 INJECTION INTRAMUSCULAR; INTRAVENOUS EVERY 6 HOURS PRN
Status: DISCONTINUED | OUTPATIENT
Start: 2018-08-06 | End: 2018-08-07 | Stop reason: HOSPADM

## 2018-08-06 RX ORDER — GABAPENTIN 100 MG/1
200 CAPSULE ORAL EVERY 12 HOURS SCHEDULED
Status: DISCONTINUED | OUTPATIENT
Start: 2018-08-07 | End: 2018-08-06

## 2018-08-06 RX ORDER — DULOXETIN HYDROCHLORIDE 60 MG/1
60 CAPSULE, DELAYED RELEASE ORAL DAILY
Status: DISCONTINUED | OUTPATIENT
Start: 2018-08-07 | End: 2018-08-07 | Stop reason: HOSPADM

## 2018-08-06 RX ORDER — TRAZODONE HYDROCHLORIDE 150 MG/1
150 TABLET ORAL NIGHTLY
Status: DISCONTINUED | OUTPATIENT
Start: 2018-08-06 | End: 2018-08-07 | Stop reason: HOSPADM

## 2018-08-06 RX ORDER — POTASSIUM CHLORIDE 750 MG/1
10 CAPSULE, EXTENDED RELEASE ORAL DAILY
Status: DISCONTINUED | OUTPATIENT
Start: 2018-08-07 | End: 2018-08-07 | Stop reason: HOSPADM

## 2018-08-06 RX ORDER — HYDROCODONE BITARTRATE AND ACETAMINOPHEN 10; 325 MG/1; MG/1
1 TABLET ORAL EVERY 8 HOURS PRN
Status: DISCONTINUED | OUTPATIENT
Start: 2018-08-06 | End: 2018-08-06

## 2018-08-06 RX ORDER — HYDROCODONE BITARTRATE AND ACETAMINOPHEN 10; 325 MG/1; MG/1
1 TABLET ORAL EVERY 12 HOURS PRN
Status: DISCONTINUED | OUTPATIENT
Start: 2018-08-06 | End: 2018-08-07 | Stop reason: HOSPADM

## 2018-08-06 RX ORDER — SUCRALFATE 1 G/1
1 TABLET ORAL 2 TIMES DAILY
COMMUNITY
Start: 2018-08-05

## 2018-08-06 RX ADMIN — HYDROCODONE BITARTRATE AND ACETAMINOPHEN 1 TABLET: 10; 325 TABLET ORAL at 16:45

## 2018-08-06 NOTE — H&P
[facility] Medicine Admission      Date of Admission: 8/6/2018      Primary Care Physician: Provider, No Known      Chief Complaint: Chest pain    HPI:  This is a 77-year-old female with past medical history of CHF, GERD, HLD, HTN and YONI that presents to [facility] with complaints of midsternal chest pain ×3 days that radiates to her chin.  She describes the pain as pressure.  BNP is stable at 209 on admission.  Patient states that she used to follow with Dr. Bell, but has not established with a cardiologist since he left.  The patient states she sees primary care through [facility] and home health follow her for unna boot changes.    The patient states she would be interested in the CHF clinic if possible.    Concurrent Medical History:  has a past medical history of Bronchitis; Chest pain; CHF (congestive heart failure) (CMS/HCC); GERD (gastroesophageal reflux disease); Hyperlipidemia; Hypertension; and Sleep apnea.    Past Surgical History:  has a past surgical history that includes Back surgery; Hysterectomy; Tonsillectomy; Colonoscopy; Breast lumpectomy; Esophagogastroduodenoscopy (N/A, 2/5/2018); and Colonoscopy (N/A, 2/5/2018).    Family History: family history includes Diabetes in her brother, brother, and sister; Heart disease in her brother, brother, and mother; Hypertension in her brother, brother, mother, and sister.    Social History:  reports that she has quit smoking. She has never used smokeless tobacco. She reports that she does not drink alcohol or use drugs.    Allergies:   Allergies   Allergen Reactions   • Morphine And Related Nausea And Vomiting       Medications:   Prior to Admission medications    Medication Sig Start Date End Date Taking? Authorizing Provider   albuterol (VENTOLIN HFA) 108 (90 BASE) MCG/ACT inhaler 2 puffs every 4 hours as needed for breathing 6/28/17  Yes Josef Pinzon MD   aspirin 81 MG chewable tablet Chew 81 mg Daily.   Yes  Erick Bray MD   bumetanide (BUMEX) 1 MG tablet Take 1 tablet by mouth Daily. 1/5/17  Yes Erick Bray MD   diltiaZEM (TAZTIA XT) 360 MG 24 hr capsule Take 360 mg by mouth Daily.   Yes Erick Bray MD   DULoxetine (CYMBALTA) 60 MG capsule Take 60 mg by mouth Daily.   Yes Erick Bray MD   gabapentin (NEURONTIN) 100 MG capsule Take 100 mg by mouth 2 (Two) Times a Day. 3/13/17  Yes Erick Bray MD   hydrochlorothiazide (HYDRODIURIL) 25 MG tablet Take 25 mg by mouth Daily.   Yes Erick Bray MD   HYDROcodone-acetaminophen (NORCO)  MG per tablet Take 1 tablet by mouth Every 8 (Eight) Hours As Needed. 3/13/17  Yes Erick Bray MD   isosorbide mononitrate (IMDUR) 30 MG 24 hr tablet Take 30 mg by mouth Daily.   Yes Erick Bray MD   latanoprost (XALATAN) 0.005 % ophthalmic solution 1 drop Every Night.   Yes Erick Bray MD   lisinopril (PRINIVIL,ZESTRIL) 20 MG tablet Take 1 tablet by mouth Daily. 3/13/17  Yes Erikc Bray MD   metoprolol tartrate (LOPRESSOR) 25 MG tablet Take 25 mg by mouth 2 (Two) Times a Day.   Yes Eirck Bray MD   pantoprazole (PROTONIX) 40 MG EC tablet Take 1 tablet by mouth Daily. 2/19/18  Yes Filomena Hunt APRN   potassium chloride (K-DUR) 10 MEQ CR tablet Take 10 mEq by mouth Daily.   Yes Erick Bray MD   pramipexole (MIRAPEX) 0.25 MG tablet Take 0.25 mg by mouth Every Night.   Yes Erick Bray MD   pravastatin (PRAVACHOL) 20 MG tablet Take 20 mg by mouth Daily.   Yes Erick Bray MD   traZODone (DESYREL) 150 MG tablet Take 150 mg by mouth Every Night.   Yes Erick Bray MD   acetaminophen (TYLENOL) 650 MG 8 hr tablet Take 650 mg by mouth Every 8 (Eight) Hours As Needed for Mild Pain (1-3).    Erick Bray MD   polyethylene glycol (MIRALAX) powder powder Take 34 g by mouth Daily. One scoop dissolved into 8 ounces of liquid daily by mouth. 1/4/18    Filomena Hunt, MARLENI       Review of Systems:  Review of Systems   Constitutional: Positive for fatigue.   Respiratory: Positive for chest tightness and shortness of breath.    Cardiovascular: Positive for leg swelling.      Otherwise complete ROS is negative except as mentioned above.    Physical Exam:   Temp:  [98.1 °F (36.7 °C)] 98.1 °F (36.7 °C)  Heart Rate:  [75-79] 75  Resp:  [22] 22  BP: (173)/(72-74) 173/72  Physical Exam   Constitutional: She is oriented to person, place, and time. She appears well-developed and well-nourished.   Eyes: Pupils are equal, round, and reactive to light. EOM are normal.   Neck: Normal range of motion. Neck supple.   Cardiovascular: Normal rate and regular rhythm.    Pulmonary/Chest: Effort normal and breath sounds normal.   Abdominal: Soft. Bowel sounds are normal.   Musculoskeletal: Normal range of motion.   Neurological: She is alert and oriented to person, place, and time.   Skin: Skin is warm and dry.   Unna boots to bilateral lower extremities.  Changed today by home health.    Psychiatric: She has a normal mood and affect.     Results Reviewed:  I have personally reviewed current lab, radiology, and data and agree with results.  Lab Results (last 24 hours)     Procedure Component Value Units Date/Time    Parks Draw [[id number]] Collected:  08/06/18 1626    Specimen:  Blood Updated:  08/06/18 1730    Narrative:       The following orders were created for panel order Parks Draw.  Procedure                               Abnormality         Status                     ---------                               -----------         ------                     Light Blue Top[[id number]]                                   Final result               Green Top (Gel)[[id number]]                                  Final result               Lavender Top[[id number]]                                     Final result               Gold Top - SST[[id number]]                                   Final  result                 Please view results for these tests on the individual orders.    Light Blue Top [[id number]] Collected:  08/06/18 1626    Specimen:  Blood Updated:  08/06/18 1730     Extra Tube hold for add-on     Comment: Auto resulted       Green Top (Gel) [[id number]] Collected:  08/06/18 1626    Specimen:  Blood Updated:  08/06/18 1730     Extra Tube Hold for add-ons.     Comment: Auto resulted.       Lavender Top [[id number]] Collected:  08/06/18 1626    Specimen:  Blood Updated:  08/06/18 1730     Extra Tube hold for add-on     Comment: Auto resulted       Gold Top - SST [[id number]] Collected:  08/06/18 1626    Specimen:  Blood Updated:  08/06/18 1730     Extra Tube Hold for add-ons.     Comment: Auto resulted.       Troponin [[id number]]  (Normal) Collected:  08/06/18 1626    Specimen:  Blood Updated:  08/06/18 1724     Troponin I <0.012 ng/mL     BNP [[id number]]  (Normal) Collected:  08/06/18 1626    Specimen:  Blood Updated:  08/06/18 1713     proBNP 209.0 pg/mL     Protime-INR [[id number]]  (Normal) Collected:  08/06/18 1626    Specimen:  Blood Updated:  08/06/18 1710     Protime 13.5 Seconds      INR 1.05    Narrative:       Therapeutic range for most indications is 2.0-3.0 INR,  or 2.5-3.5 for mechanical heart valves.    Comprehensive Metabolic Panel [[id number]]  (Normal) Collected:  08/06/18 1626    Specimen:  Blood Updated:  08/06/18 1702     Glucose 87 mg/dL      BUN 19 mg/dL      Creatinine 0.87 mg/dL      Sodium 137 mmol/L      Potassium 3.9 mmol/L      Chloride 102 mmol/L      CO2 25.0 mmol/L      Calcium 9.0 mg/dL      Total Protein 7.2 g/dL      Albumin 4.00 g/dL      ALT (SGPT) 18 U/L      AST (SGOT) 19 U/L      Alkaline Phosphatase 87 U/L      Total Bilirubin 0.4 mg/dL      eGFR Non African Amer 63 mL/min/1.73      Globulin 3.2 gm/dL      A/G Ratio 1.3 g/dL      BUN/Creatinine Ratio 21.8     Anion Gap 10.0 mmol/L     Narrative:       The MDRD GFR formula is only valid for adults with stable  renal function between ages 18 and 70.    CBC & Differential [[id number]] Collected:  08/06/18 1626    Specimen:  Blood Updated:  08/06/18 1642    Narrative:       The following orders were created for panel order CBC & Differential.  Procedure                               Abnormality         Status                     ---------                               -----------         ------                     CBC Auto Differential[[id number]]        Abnormal            Final result                 Please view results for these tests on the individual orders.    CBC Auto Differential [[id number]]  (Abnormal) Collected:  08/06/18 1626    Specimen:  Blood Updated:  08/06/18 1642     WBC 11.58 (H) 10*3/mm3      RBC 3.76 (L) 10*6/mm3      Hemoglobin 9.2 (L) g/dL      Hematocrit 30.9 (L) %      MCV 82.2 fL      MCH 24.5 (L) pg      MCHC 29.8 (L) g/dL      RDW 17.5 (H) %      RDW-SD 52.9 (H) fl      MPV 9.4 fL      Platelets 338 10*3/mm3      Neutrophil % 61.6 %      Lymphocyte % 20.9 %      Monocyte % 11.1 %      Eosinophil % 5.8 %      Basophil % 0.3 %      Immature Grans % 0.3 %      Neutrophils, Absolute 7.15 10*3/mm3      Lymphocytes, Absolute 2.42 10*3/mm3      Monocytes, Absolute 1.28 (H) 10*3/mm3      Eosinophils, Absolute 0.67 10*3/mm3      Basophils, Absolute 0.03 10*3/mm3      Immature Grans, Absolute 0.03 (H) 10*3/mm3         Imaging Results (last 24 hours)     Procedure Component Value Units Date/Time    XR Chest 1 View [[id number]] Collected:  08/06/18 1611     Updated:  08/06/18 1627    Narrative:       Chest single view.      CLINICAL INDICATION: Shortness of breath. Chest pain protocol    COMPARISON: August 29, 2014.    FINDINGS: Borderline cardiomegaly. Lung fields clear. No active  infiltrates, masses, or pulmonary vascular engorgement.      Impression:       CONCLUSION: Borderline cardiomegaly. Otherwise unremarkable  chest.    Electronically signed by:  Duane Bradshaw MD  8/6/2018 4:26 PM CDT  Workstation:  [id number]        Active Hospital Problems    Diagnosis Date Noted   • Chest pain [R07.9] 08/06/2018       Plan:  1.  Chest pain, rule out ACS: Serial cardiac enzymes, continuous telemetry and EKG.  Echocardiogram and ultrasound of the carotids pending.  Cardiology results pending test results.   2.  History of CHF:  No recent echo.  Continue Bumex.  BNP is stable.  Echo pending.  CHF clinic consult pending echo results. Low sodium diet and fluid restrictions.    3.  Hypertension:  HCTZ, lisinopril, and metoprolol.  Hydralazine prn sbp>160.   4.  Chronic anemia:  Hemoglobin 9.2.  Will recheck CBC in am.     I discussed the patients findings and my .pecommendations with: Patient    The patient would like to be DNR.        This document has been electronically signed by MARLENI Painter on August 6, 2018 6:38 PM

## 2018-08-06 NOTE — ED PROVIDER NOTES
"Subjective   Patient presents to emergency department for chest pain x 3 days.  Describes chest pain as \"someone sitting on my chest\".  Denies syncope, nausea, vomiting, weakness, dizziness, diaphoresis.          History provided by:  Patient   used: No    Chest Pain   Pain location:  Substernal area  Pain quality: pressure    Pain radiates to:  Does not radiate  Pain severity:  Moderate  Onset quality:  Gradual  Duration:  3 days  Timing:  Intermittent  Progression:  Waxing and waning  Chronicity:  New  Context: at rest    Worsened by:  Exertion  Associated symptoms: fatigue, lower extremity edema (wearing bilateral unna boots) and shortness of breath    Associated symptoms: no abdominal pain, no altered mental status, no anxiety, no back pain, no cough, no dizziness, no dysphagia, no fever, no headache, no heartburn, no near-syncope, no numbness, no palpitations, no syncope, no vomiting and no weakness    Shortness of Breath   Associated symptoms: chest pain    Associated symptoms: no abdominal pain, no cough, no fever, no headaches, no syncope and no vomiting        Review of Systems   Constitutional: Positive for chills and fatigue. Negative for fever.   HENT: Negative for trouble swallowing.    Eyes: Negative for visual disturbance.   Respiratory: Positive for shortness of breath. Negative for cough.    Cardiovascular: Positive for chest pain and leg swelling. Negative for palpitations, syncope and near-syncope.   Gastrointestinal: Negative for abdominal pain, heartburn and vomiting.   Endocrine: Negative for polyuria.   Genitourinary: Negative for dysuria and flank pain.   Musculoskeletal: Negative for back pain.   Skin: Negative for color change.   Allergic/Immunologic: Negative for immunocompromised state.   Neurological: Negative for dizziness, weakness, numbness and headaches.   Hematological: Does not bruise/bleed easily.   Psychiatric/Behavioral: Negative for confusion.       Past " "Medical History:   Diagnosis Date   • Bronchitis    • Chest pain    • CHF (congestive heart failure) (CMS/HCC)    • GERD (gastroesophageal reflux disease)    • Hyperlipidemia    • Hypertension    • Sleep apnea        Allergies   Allergen Reactions   • Morphine And Related Nausea And Vomiting       Past Surgical History:   Procedure Laterality Date   • BACK SURGERY     • BREAST LUMPECTOMY     • COLONOSCOPY     • COLONOSCOPY N/A 2/5/2018    Procedure: COLONOSCOPY;  Surgeon: Josesito Garcia MD;  Location: [facility] ENDOSCOPY;  Service:    • ENDOSCOPY N/A 2/5/2018    Procedure: ESOPHAGOGASTRODUODENOSCOPY;  Surgeon: Josesito Garcia MD;  Location: [facility] ENDOSCOPY;  Service:    • HYSTERECTOMY     • TONSILLECTOMY         Family History   Problem Relation Age of Onset   • Heart disease Mother    • Hypertension Mother    • Hypertension Sister    • Diabetes Sister    • Heart disease Brother    • Hypertension Brother    • Diabetes Brother    • Heart disease Brother    • Hypertension Brother    • Diabetes Brother        Social History     Social History   • Marital status: Single     Social History Main Topics   • Smoking status: Former Smoker   • Smokeless tobacco: Never Used      Comment: quit 50 + years ago    • Alcohol use No   • Drug use: No   • Sexual activity: Defer     Other Topics Concern   • Not on file           Objective      /72 (BP Location: Right arm, Patient Position: Lying)   Pulse 75   Temp 98.1 °F (36.7 °C) (Temporal Artery )   Resp 22   Ht 162.6 cm (64\")   Wt 127 kg (280 lb)   SpO2 99%   BMI 48.06 kg/m²     Physical Exam   Constitutional: She is oriented to person, place, and time. She appears well-developed and well-nourished. No distress.   Morbidly obese     HENT:   Head: Normocephalic and atraumatic.   Eyes: Conjunctivae are normal.   Cardiovascular: Normal rate, regular rhythm and normal heart sounds.    Abdominal: Soft. She exhibits no distension. There is no tenderness.   Genitourinary: " Rectal exam shows external hemorrhoid (non-thrombosed). Rectal exam shows no internal hemorrhoid, no fissure, no mass, no tenderness, anal tone normal and guaiac negative stool.   Musculoskeletal: She exhibits no tenderness or deformity.   Neurological: She is alert and oriented to person, place, and time.   Skin: Skin is warm. Capillary refill takes less than 2 seconds.   Psychiatric: She has a normal mood and affect. Her behavior is normal. Thought content normal.   Nursing note and vitals reviewed.      ECG 12 Lead    Date/Time: 8/6/2018 6:11 PM  Performed by: VALENTINE ARECHIGA  Authorized by: KEITH MOORE   Interpreted by physician  Comparison: not compared with previous ECG   Rhythm: sinus rhythm  Rate: normal  BPM: 79  ST Segments: ST segments normal  Clinical impression: low voltage                 ED Course      Results for orders placed or performed during the hospital encounter of 08/06/18   Troponin   Result Value Ref Range    Troponin I <0.012 <=0.034 ng/mL   Comprehensive Metabolic Panel   Result Value Ref Range    Glucose 87 60 - 100 mg/dL    BUN 19 7 - 21 mg/dL    Creatinine 0.87 0.50 - 1.00 mg/dL    Sodium 137 137 - 145 mmol/L    Potassium 3.9 3.5 - 5.1 mmol/L    Chloride 102 95 - 110 mmol/L    CO2 25.0 22.0 - 31.0 mmol/L    Calcium 9.0 8.4 - 10.2 mg/dL    Total Protein 7.2 6.3 - 8.6 g/dL    Albumin 4.00 3.40 - 4.80 g/dL    ALT (SGPT) 18 9 - 52 U/L    AST (SGOT) 19 14 - 36 U/L    Alkaline Phosphatase 87 38 - 126 U/L    Total Bilirubin 0.4 0.2 - 1.3 mg/dL    eGFR Non African Amer 63 39 - 90 mL/min/1.73    Globulin 3.2 2.3 - 3.5 gm/dL    A/G Ratio 1.3 1.1 - 1.8 g/dL    BUN/Creatinine Ratio 21.8 7.0 - 25.0    Anion Gap 10.0 5.0 - 15.0 mmol/L   BNP   Result Value Ref Range    proBNP 209.0 0.0 - 900.0 pg/mL   Protime-INR   Result Value Ref Range    Protime 13.5 11.1 - 15.3 Seconds    INR 1.05 0.80 - 1.20   CBC Auto Differential   Result Value Ref Range    WBC 11.58 (H) 3.20 - 9.80 10*3/mm3     RBC 3.76 (L) 3.77 - 5.16 10*6/mm3    Hemoglobin 9.2 (L) 12.0 - 15.5 g/dL    Hematocrit 30.9 (L) 35.0 - 45.0 %    MCV 82.2 80.0 - 98.0 fL    MCH 24.5 (L) 26.5 - 34.0 pg    MCHC 29.8 (L) 31.4 - 36.0 g/dL    RDW 17.5 (H) 11.5 - 14.5 %    RDW-SD 52.9 (H) 36.4 - 46.3 fl    MPV 9.4 8.0 - 12.0 fL    Platelets 338 150 - 450 10*3/mm3    Neutrophil % 61.6 37.0 - 80.0 %    Lymphocyte % 20.9 10.0 - 50.0 %    Monocyte % 11.1 0.0 - 12.0 %    Eosinophil % 5.8 0.0 - 7.0 %    Basophil % 0.3 0.0 - 2.0 %    Immature Grans % 0.3 0.0 - 0.5 %    Neutrophils, Absolute 7.15 2.00 - 8.60 10*3/mm3    Lymphocytes, Absolute 2.42 0.60 - 4.20 10*3/mm3    Monocytes, Absolute 1.28 (H) 0.00 - 0.90 10*3/mm3    Eosinophils, Absolute 0.67 0.00 - 0.70 10*3/mm3    Basophils, Absolute 0.03 0.00 - 0.20 10*3/mm3    Immature Grans, Absolute 0.03 (H) 0.00 - 0.02 10*3/mm3   Light Blue Top   Result Value Ref Range    Extra Tube hold for add-on    Green Top (Gel)   Result Value Ref Range    Extra Tube Hold for add-ons.    Lavender Top   Result Value Ref Range    Extra Tube hold for add-on    Gold Top - SST   Result Value Ref Range    Extra Tube Hold for add-ons.      Xr Chest 1 View    Result Date: 8/6/2018  Narrative: Chest single view. CLINICAL INDICATION: Shortness of breath. Chest pain protocol COMPARISON: August 29, 2014. FINDINGS: Borderline cardiomegaly. Lung fields clear. No active infiltrates, masses, or pulmonary vascular engorgement.     Impression: CONCLUSION: Borderline cardiomegaly. Otherwise unremarkable chest. Electronically signed by:  Duane Bradshaw MD  8/6/2018 4:26 PM CDT Workstation: [id number]              HEART Score (for prediction of 6-week risk of major adverse cardiac event) reviewed and/or performed as part of the patient evaluation and treatment planning process.  The result associated with this review/performance is: 5       MDM      Final diagnoses:   Chest pain, unspecified type   Anemia, unspecified type            [place]  ANISA SIEGEL  08/06/18 1847

## 2018-08-07 ENCOUNTER — APPOINTMENT (OUTPATIENT)
Dept: ULTRASOUND IMAGING | Facility: HOSPITAL | Age: 78
End: 2018-08-07

## 2018-08-07 ENCOUNTER — APPOINTMENT (OUTPATIENT)
Dept: CARDIOLOGY | Facility: HOSPITAL | Age: 78
End: 2018-08-07

## 2018-08-07 VITALS
WEIGHT: 266 LBS | HEART RATE: 80 BPM | TEMPERATURE: 97.3 F | DIASTOLIC BLOOD PRESSURE: 58 MMHG | BODY MASS INDEX: 45.41 KG/M2 | HEIGHT: 64 IN | OXYGEN SATURATION: 94 % | SYSTOLIC BLOOD PRESSURE: 148 MMHG | RESPIRATION RATE: 18 BRPM

## 2018-08-07 LAB
ANION GAP SERPL CALCULATED.3IONS-SCNC: 7 MMOL/L (ref 5–15)
BASOPHILS # BLD AUTO: 0.02 10*3/MM3 (ref 0–0.2)
BASOPHILS NFR BLD AUTO: 0.2 % (ref 0–2)
BH CV ECHO MEAS - ACS: 2.1 CM
BH CV ECHO MEAS - AO ROOT AREA (BSA CORRECTED): 1.3
BH CV ECHO MEAS - AO ROOT AREA: 6.6 CM^2
BH CV ECHO MEAS - AO ROOT DIAM: 2.9 CM
BH CV ECHO MEAS - BSA(HAYCOCK): 2.4 M^2
BH CV ECHO MEAS - BSA: 2.2 M^2
BH CV ECHO MEAS - BZI_BMI: 45.7 KILOGRAMS/M^2
BH CV ECHO MEAS - BZI_METRIC_HEIGHT: 162.6 CM
BH CV ECHO MEAS - BZI_METRIC_WEIGHT: 120.7 KG
BH CV ECHO MEAS - EDV(CUBED): 62.6 ML
BH CV ECHO MEAS - EDV(TEICH): 68.8 ML
BH CV ECHO MEAS - EF(CUBED): 68.5 %
BH CV ECHO MEAS - EF(TEICH): 60.7 %
BH CV ECHO MEAS - ESV(CUBED): 19.7 ML
BH CV ECHO MEAS - ESV(TEICH): 27 ML
BH CV ECHO MEAS - FS: 32 %
BH CV ECHO MEAS - IVS/LVPW: 0.94
BH CV ECHO MEAS - IVSD: 1.2 CM
BH CV ECHO MEAS - LA DIMENSION: 3.8 CM
BH CV ECHO MEAS - LA/AO: 1.3
BH CV ECHO MEAS - LV MASS(C)D: 167.7 GRAMS
BH CV ECHO MEAS - LV MASS(C)DI: 75.9 GRAMS/M^2
BH CV ECHO MEAS - LVIDD: 4 CM
BH CV ECHO MEAS - LVIDS: 2.7 CM
BH CV ECHO MEAS - LVOT AREA (M): 3.5 CM^2
BH CV ECHO MEAS - LVOT AREA: 3.5 CM^2
BH CV ECHO MEAS - LVOT DIAM: 2.1 CM
BH CV ECHO MEAS - LVPWD: 1.3 CM
BH CV ECHO MEAS - PA MAX PG: 4.2 MMHG
BH CV ECHO MEAS - PA V2 MAX: 103 CM/SEC
BH CV ECHO MEAS - RAP SYSTOLE: 5 MMHG
BH CV ECHO MEAS - RVDD: 2.7 CM
BH CV ECHO MEAS - RVSP: 24.2 MMHG
BH CV ECHO MEAS - SI(CUBED): 19.4 ML/M^2
BH CV ECHO MEAS - SI(TEICH): 18.9 ML/M^2
BH CV ECHO MEAS - SV(CUBED): 42.9 ML
BH CV ECHO MEAS - SV(TEICH): 41.7 ML
BH CV ECHO MEAS - TR MAX VEL: 219 CM/SEC
BUN BLD-MCNC: 15 MG/DL (ref 7–21)
BUN/CREAT SERPL: 18.8 (ref 7–25)
CALCIUM SPEC-SCNC: 9 MG/DL (ref 8.4–10.2)
CHLORIDE SERPL-SCNC: 107 MMOL/L (ref 95–110)
CO2 SERPL-SCNC: 27 MMOL/L (ref 22–31)
CREAT BLD-MCNC: 0.8 MG/DL (ref 0.5–1)
DEPRECATED RDW RBC AUTO: 52.2 FL (ref 36.4–46.3)
EOSINOPHIL # BLD AUTO: 0.86 10*3/MM3 (ref 0–0.7)
EOSINOPHIL NFR BLD AUTO: 10.1 % (ref 0–7)
ERYTHROCYTE [DISTWIDTH] IN BLOOD BY AUTOMATED COUNT: 17.5 % (ref 11.5–14.5)
GFR SERPL CREATININE-BSD FRML MDRD: 70 ML/MIN/1.73 (ref 39–90)
GLUCOSE BLD-MCNC: 101 MG/DL (ref 60–100)
HCT VFR BLD AUTO: 30.3 % (ref 35–45)
HGB BLD-MCNC: 9 G/DL (ref 12–15.5)
IMM GRANULOCYTES # BLD: 0.02 10*3/MM3 (ref 0–0.02)
IMM GRANULOCYTES NFR BLD: 0.2 % (ref 0–0.5)
LV EF 2D ECHO EST: 58 %
LYMPHOCYTES # BLD AUTO: 2.31 10*3/MM3 (ref 0.6–4.2)
LYMPHOCYTES NFR BLD AUTO: 27 % (ref 10–50)
MCH RBC QN AUTO: 24.1 PG (ref 26.5–34)
MCHC RBC AUTO-ENTMCNC: 29.7 G/DL (ref 31.4–36)
MCV RBC AUTO: 81.2 FL (ref 80–98)
MONOCYTES # BLD AUTO: 0.75 10*3/MM3 (ref 0–0.9)
MONOCYTES NFR BLD AUTO: 8.8 % (ref 0–12)
NEUTROPHILS # BLD AUTO: 4.58 10*3/MM3 (ref 2–8.6)
NEUTROPHILS NFR BLD AUTO: 53.7 % (ref 37–80)
PLATELET # BLD AUTO: 310 10*3/MM3 (ref 150–450)
PMV BLD AUTO: 9.5 FL (ref 8–12)
POTASSIUM BLD-SCNC: 4.1 MMOL/L (ref 3.5–5.1)
RBC # BLD AUTO: 3.73 10*6/MM3 (ref 3.77–5.16)
SODIUM BLD-SCNC: 141 MMOL/L (ref 137–145)
TROPONIN I SERPL-MCNC: <0.012 NG/ML
WBC NRBC COR # BLD: 8.54 10*3/MM3 (ref 3.2–9.8)

## 2018-08-07 PROCEDURE — 84484 ASSAY OF TROPONIN QUANT: CPT | Performed by: FAMILY MEDICINE

## 2018-08-07 PROCEDURE — G0378 HOSPITAL OBSERVATION PER HR: HCPCS

## 2018-08-07 PROCEDURE — 84484 ASSAY OF TROPONIN QUANT: CPT | Performed by: NURSE PRACTITIONER

## 2018-08-07 PROCEDURE — 93880 EXTRACRANIAL BILAT STUDY: CPT

## 2018-08-07 PROCEDURE — 80048 BASIC METABOLIC PNL TOTAL CA: CPT | Performed by: NURSE PRACTITIONER

## 2018-08-07 PROCEDURE — 93306 TTE W/DOPPLER COMPLETE: CPT | Performed by: INTERNAL MEDICINE

## 2018-08-07 PROCEDURE — 85025 COMPLETE CBC W/AUTO DIFF WBC: CPT | Performed by: NURSE PRACTITIONER

## 2018-08-07 PROCEDURE — 93306 TTE W/DOPPLER COMPLETE: CPT

## 2018-08-07 RX ADMIN — LISINOPRIL 40 MG: 40 TABLET ORAL at 08:44

## 2018-08-07 RX ADMIN — TRAZODONE HYDROCHLORIDE 150 MG: 150 TABLET ORAL at 01:37

## 2018-08-07 RX ADMIN — METOPROLOL TARTRATE 25 MG: 25 TABLET ORAL at 08:44

## 2018-08-07 RX ADMIN — LATANOPROST 1 DROP: 50 SOLUTION OPHTHALMIC at 00:15

## 2018-08-07 RX ADMIN — GABAPENTIN 200 MG: 100 CAPSULE ORAL at 00:15

## 2018-08-07 RX ADMIN — DULOXETINE 60 MG: 60 CAPSULE, DELAYED RELEASE ORAL at 08:43

## 2018-08-07 RX ADMIN — ASPIRIN 81 MG 81 MG: 81 TABLET ORAL at 08:43

## 2018-08-07 RX ADMIN — METOPROLOL TARTRATE 25 MG: 25 TABLET ORAL at 00:15

## 2018-08-07 RX ADMIN — DILTIAZEM HYDROCHLORIDE 300 MG: 300 CAPSULE, COATED, EXTENDED RELEASE ORAL at 08:43

## 2018-08-07 RX ADMIN — ISOSORBIDE MONONITRATE 30 MG: 30 TABLET, EXTENDED RELEASE ORAL at 08:44

## 2018-08-07 RX ADMIN — BUMETANIDE 1 MG: 1 TABLET ORAL at 08:43

## 2018-08-07 RX ADMIN — PRAMIPEXOLE DIHYDROCHLORIDE 0.25 MG: 0.25 TABLET ORAL at 00:15

## 2018-08-07 RX ADMIN — SUCRALFATE 1 G: 1 TABLET ORAL at 08:43

## 2018-08-07 RX ADMIN — PANTOPRAZOLE SODIUM 40 MG: 40 TABLET, DELAYED RELEASE ORAL at 08:43

## 2018-08-07 RX ADMIN — ATORVASTATIN CALCIUM 10 MG: 10 TABLET, FILM COATED ORAL at 08:43

## 2018-08-07 RX ADMIN — GABAPENTIN 200 MG: 100 CAPSULE ORAL at 08:43

## 2018-08-07 RX ADMIN — HYDROCHLOROTHIAZIDE 25 MG: 25 TABLET ORAL at 08:44

## 2018-08-07 RX ADMIN — POTASSIUM CHLORIDE 10 MEQ: 750 CAPSULE, EXTENDED RELEASE ORAL at 08:44

## 2018-08-07 NOTE — DISCHARGE SUMMARY
[facility]  DISCHARGE SUMMARY       Date of Admission: 8/6/2018  Date of Discharge:  8/7/2018  Primary Care Physician: Provider, No Known    Presenting Problem/History of Present Illness:  Anemia, unspecified type [D64.9]  Chest pain, unspecified type [R07.9]     Final Discharge Diagnoses:  Hospital Problem List     Chest pain          Consults:   Consults     Date and Time Order Name Status Description    8/6/2018 1810 Hospitalist (on-call MD unless specified)          Pertinent Test Results:   Lab Results (last 24 hours)     Procedure Component Value Units Date/Time    Troponin [[id number]]  (Normal) Collected:  08/07/18 1139    Specimen:  Blood Updated:  08/07/18 1210     Troponin I <0.012 ng/mL     Troponin [[id number]]  (Normal) Collected:  08/07/18 0656    Specimen:  Blood Updated:  08/07/18 0728     Troponin I <0.012 ng/mL     Basic Metabolic Panel [[id number]]  (Abnormal) Collected:  08/07/18 0656    Specimen:  Blood Updated:  08/07/18 0720     Glucose 101 (H) mg/dL      BUN 15 mg/dL      Creatinine 0.80 mg/dL      Sodium 141 mmol/L      Potassium 4.1 mmol/L      Chloride 107 mmol/L      CO2 27.0 mmol/L      Calcium 9.0 mg/dL      eGFR Non African Amer 70 mL/min/1.73      BUN/Creatinine Ratio 18.8     Anion Gap 7.0 mmol/L     Narrative:       The MDRD GFR formula is only valid for adults with stable renal function between ages 18 and 70.    CBC & Differential [[id number]] Collected:  08/07/18 0657    Specimen:  Blood Updated:  08/07/18 0706    Narrative:       The following orders were created for panel order CBC & Differential.  Procedure                               Abnormality         Status                     ---------                               -----------         ------                     CBC Auto Differential[[id number]]        Abnormal            Final result                 Please view results for these tests on the individual orders.     CBC Auto Differential [[id number]]  (Abnormal) Collected:  08/07/18 0657    Specimen:  Blood Updated:  08/07/18 0706     WBC 8.54 10*3/mm3      RBC 3.73 (L) 10*6/mm3      Hemoglobin 9.0 (L) g/dL      Hematocrit 30.3 (L) %      MCV 81.2 fL      MCH 24.1 (L) pg      MCHC 29.7 (L) g/dL      RDW 17.5 (H) %      RDW-SD 52.2 (H) fl      MPV 9.5 fL      Platelets 310 10*3/mm3      Neutrophil % 53.7 %      Lymphocyte % 27.0 %      Monocyte % 8.8 %      Eosinophil % 10.1 (H) %      Basophil % 0.2 %      Immature Grans % 0.2 %      Neutrophils, Absolute 4.58 10*3/mm3      Lymphocytes, Absolute 2.31 10*3/mm3      Monocytes, Absolute 0.75 10*3/mm3      Eosinophils, Absolute 0.86 (H) 10*3/mm3      Basophils, Absolute 0.02 10*3/mm3      Immature Grans, Absolute 0.02 10*3/mm3     Troponin [[id number]]  (Normal) Collected:  08/07/18 0143    Specimen:  Blood Updated:  08/07/18 0224     Troponin I <0.012 ng/mL     Troponin [[id number]]  (Normal) Collected:  08/06/18 1910    Specimen:  Blood Updated:  08/06/18 1950     Troponin I <0.012 ng/mL     [place] Draw [[id number]] Collected:  08/06/18 1626    Specimen:  Blood Updated:  08/06/18 1730    Narrative:       The following orders were created for panel order [place] Draw.  Procedure                               Abnormality         Status                     ---------                               -----------         ------                     Light Blue Top[[id number]]                                   Final result               Green Top (Gel)[[id number]]                                  Final result               Lavender Top[[id number]]                                     Final result               Gold Top - SST[[id number]]                                   Final result                 Please view results for these tests on the individual orders.    Light Blue Top [[id number]] Collected:  08/06/18 1626    Specimen:  Blood Updated:  08/06/18 1730     Extra Tube hold for add-on      Comment: Auto resulted       Green Top (Gel) [[id number]] Collected:  08/06/18 1626    Specimen:  Blood Updated:  08/06/18 1730     Extra Tube Hold for add-ons.     Comment: Auto resulted.       Lavender Top [[id number]] Collected:  08/06/18 1626    Specimen:  Blood Updated:  08/06/18 1730     Extra Tube hold for add-on     Comment: Auto resulted       Gold Top - SST [[id number]] Collected:  08/06/18 1626    Specimen:  Blood Updated:  08/06/18 1730     Extra Tube Hold for add-ons.     Comment: Auto resulted.       Troponin [[id number]]  (Normal) Collected:  08/06/18 1626    Specimen:  Blood Updated:  08/06/18 1724     Troponin I <0.012 ng/mL     BNP [[id number]]  (Normal) Collected:  08/06/18 1626    Specimen:  Blood Updated:  08/06/18 1713     proBNP 209.0 pg/mL     Protime-INR [[id number]]  (Normal) Collected:  08/06/18 1626    Specimen:  Blood Updated:  08/06/18 1710     Protime 13.5 Seconds      INR 1.05    Narrative:       Therapeutic range for most indications is 2.0-3.0 INR,  or 2.5-3.5 for mechanical heart valves.    Comprehensive Metabolic Panel [[id number]]  (Normal) Collected:  08/06/18 1626    Specimen:  Blood Updated:  08/06/18 1702     Glucose 87 mg/dL      BUN 19 mg/dL      Creatinine 0.87 mg/dL      Sodium 137 mmol/L      Potassium 3.9 mmol/L      Chloride 102 mmol/L      CO2 25.0 mmol/L      Calcium 9.0 mg/dL      Total Protein 7.2 g/dL      Albumin 4.00 g/dL      ALT (SGPT) 18 U/L      AST (SGOT) 19 U/L      Alkaline Phosphatase 87 U/L      Total Bilirubin 0.4 mg/dL      eGFR Non African Amer 63 mL/min/1.73      Globulin 3.2 gm/dL      A/G Ratio 1.3 g/dL      BUN/Creatinine Ratio 21.8     Anion Gap 10.0 mmol/L     Narrative:       The MDRD GFR formula is only valid for adults with stable renal function between ages 18 and 70.    CBC & Differential [[id number]] Collected:  08/06/18 1626    Specimen:  Blood Updated:  08/06/18 1642    Narrative:       The following orders were created for panel order CBC &  Differential.  Procedure                               Abnormality         Status                     ---------                               -----------         ------                     CBC Auto Differential[[id number]]        Abnormal            Final result                 Please view results for these tests on the individual orders.    CBC Auto Differential [[id number]]  (Abnormal) Collected:  08/06/18 1626    Specimen:  Blood Updated:  08/06/18 1642     WBC 11.58 (H) 10*3/mm3      RBC 3.76 (L) 10*6/mm3      Hemoglobin 9.2 (L) g/dL      Hematocrit 30.9 (L) %      MCV 82.2 fL      MCH 24.5 (L) pg      MCHC 29.8 (L) g/dL      RDW 17.5 (H) %      RDW-SD 52.9 (H) fl      MPV 9.4 fL      Platelets 338 10*3/mm3      Neutrophil % 61.6 %      Lymphocyte % 20.9 %      Monocyte % 11.1 %      Eosinophil % 5.8 %      Basophil % 0.3 %      Immature Grans % 0.3 %      Neutrophils, Absolute 7.15 10*3/mm3      Lymphocytes, Absolute 2.42 10*3/mm3      Monocytes, Absolute 1.28 (H) 10*3/mm3      Eosinophils, Absolute 0.67 10*3/mm3      Basophils, Absolute 0.03 10*3/mm3      Immature Grans, Absolute 0.03 (H) 10*3/mm3         Imaging Results (last 24 hours)     Procedure Component Value Units Date/Time    US Carotid Bilateral [[id number]] Collected:  08/07/18 1017     Updated:  08/07/18 1423    Narrative:         ULTRASOUND CAROTID DUPLEX SCAN    HISTORY: Chest tightness radiating to the neck.    COMPARISON: March 7, 2013.    Duplex ultrasound of the carotid bifurcations was performed.    Real time and color flow images demonstrate left greater than  right bilateral plaque formation.  The peak systolic velocity in the right internal carotid artery  is 117.7 cm/s.  End-diastolic velocity 20.9 cm/s.  Ratio peak systolic velocity right internal carotid artery to  right common carotid 1.5.  The peak systolic velocity in left internal carotid artery is  elevated to 425.8 cm/s.  End diastolic velocity increased to 106.3 cm second.  Ratio  peak systolic velocity left internal carotid artery left  common carotid artery increased to 4.7.  Antegrade flow is present in each vertebral artery.      Impression:       CONCLUSION:  70-99% diameter reduction stenosis left internal carotid artery.  Less than 50% diameter reduction stenosis right internal carotid  artery.    Report called at approximately 2:30 PM CST.    [id number]    Electronically signed by:  Luiz Brar MD  8/7/2018 2:21 PM CDT  Workstation: [employer] Chest 1 View [[id number]] Collected:  08/06/18 1611     Updated:  08/06/18 1627    Narrative:       Chest single view.      CLINICAL INDICATION: Shortness of breath. Chest pain protocol    COMPARISON: August 29, 2014.    FINDINGS: Borderline cardiomegaly. Lung fields clear. No active  infiltrates, masses, or pulmonary vascular engorgement.      Impression:       CONCLUSION: Borderline cardiomegaly. Otherwise unremarkable  chest.    Electronically signed by:  Duane Bradshaw MD  8/6/2018 4:26 PM CDT  Workstation: [id number]          Chief Complaint on Day of Discharge: No complaints    Hospital Course:    This is a 77-year-old female with past medical history of CHF, GERD, HLD, HTN and YONI that presents to [facility] with complaints of midsternal chest pain ×3 days that radiates to her chin.  She describes the pain as pressure.  BNP is stable at 209 on admission.  Patient states that she used to follow with Dr. Bell, but has not established with a cardiologist since he left.  The patient states she sees primary care through [facility] and home health follow her for unna boot changes.  The patient will be established with Dr. Gutierrez for HFpEF and small pericardial effusion (epidcardial fat cannot be ruled out).  The patient has not been compliant with her Bumex.  Patient educated on the importance of taking her diuretic.   Spoke with [facility] about 70-99% stenosis.  Since the patient is asymptomatic, a CTA of the carotids has been  "scheduled for 8/10/2018 at 1:00 and the patient will follow with her for results as a outpatient.  The patient states she had a ultrasound of her carotids in 2013.  I called medical records and spoke with June.  No results could be found for comparison. The patient is followed by [facility] and they will follow with her within a week for recheck.  Home health to continue Unna Boot changes.      Echocardiogram results:     · The left ventricular cavity is borderline dilated.  · Left ventricular wall thickness is consistent with mild-to-moderate concentric hypertrophy.  · Left ventricular systolic function is normal. Estimated EF = 58%.  · Left ventricular diastolic dysfunction (grade I) consistent with impaired relaxation.  · Left atrial cavity size is borderline dilated.  · There is a small (<1cm) pericardial effusion. Epicardial fat could not be ruled out  · Study is technically difficult for diagnosis    Ultrasound of the carotid arteries:      70-99% diameter reduction stenosis left internal carotid artery.  Less than 50% diameter reduction stenosis right internal carotid  artery.    Condition on Discharge:  Stable    Physical Exam on Discharge:  /65 (BP Location: Right arm, Patient Position: Sitting)   Pulse 73   Temp 97.2 °F (36.2 °C) (Temporal Artery )   Resp 18   Ht 162.6 cm (64.02\")   Wt 121 kg (266 lb)   SpO2 95%   BMI 45.64 kg/m²   Physical Exam   Constitutional: She is oriented to person, place, and time. She appears well-developed and well-nourished.   HENT:   Head: Normocephalic and atraumatic.   Eyes: Pupils are equal, round, and reactive to light. EOM are normal.   Neck: Normal range of motion. Neck supple.   Cardiovascular: Normal rate and regular rhythm.    Pulmonary/Chest: Effort normal and breath sounds normal.   Abdominal: Soft. Bowel sounds are normal.   Musculoskeletal: Normal range of motion.   Neurological: She is alert and oriented to person, place, and time.   Skin: Skin is warm and " dry.   Psychiatric: She has a normal mood and affect.     Discharge Disposition:  Home or Self Care    Discharge Medications:     Discharge Medications      Changes to Medications      Instructions Start Date   pantoprazole 40 MG EC tablet  Commonly known as:  PROTONIX  What changed:  when to take this   40 mg, Oral, Daily         Continue These Medications      Instructions Start Date   albuterol 108 (90 Base) MCG/ACT inhaler  Commonly known as:  VENTOLIN HFA   2 puffs every 4 hours as needed for breathing      aspirin 81 MG chewable tablet   81 mg, Oral, Every Morning      bumetanide 1 MG tablet  Commonly known as:  BUMEX   1 tablet, Oral, Every Morning      Cholecalciferol 1000 units tablet   1,000 Units, Oral, Every Morning      DULoxetine 60 MG capsule  Commonly known as:  CYMBALTA   60 mg, Oral, Every Morning      gabapentin 100 MG capsule  Commonly known as:  NEURONTIN   200 mg, Oral, Nightly      hydrochlorothiazide 25 MG tablet  Commonly known as:  HYDRODIURIL   25 mg, Oral, Every Morning      HYDROcodone-acetaminophen  MG per tablet  Commonly known as:  NORCO   1 tablet, Oral, Every 12 Hours PRN      isosorbide mononitrate 30 MG 24 hr tablet  Commonly known as:  IMDUR   30 mg, Oral, Every Morning      latanoprost 0.005 % ophthalmic solution  Commonly known as:  XALATAN   1 drop, Both Eyes, Nightly      lisinopril 20 MG tablet  Commonly known as:  PRINIVIL,ZESTRIL   40 mg, Oral, Every Morning      meclizine 12.5 MG tablet  Commonly known as:  ANTIVERT   12.5 mg, Oral, Every 12 Hours PRN      metoprolol tartrate 25 MG tablet  Commonly known as:  LOPRESSOR   25 mg, Oral, 2 Times Daily      potassium chloride 10 MEQ CR tablet  Commonly known as:  K-DUR   10 mEq, Oral, Nightly      pramipexole 0.25 MG tablet  Commonly known as:  MIRAPEX   0.25 mg, Oral, Nightly      pravastatin 20 MG tablet  Commonly known as:  PRAVACHOL   20 mg, Oral, Nightly      sucralfate 1 g tablet  Commonly known as:  CARAFATE   1  g, Oral, 2 Times Daily      TAZTIA  MG 24 hr capsule  Generic drug:  diltiaZEM   360 mg, Oral, Nightly      tiZANidine 4 MG tablet  Commonly known as:  ZANAFLEX   2 mg, Oral, 2 Times Daily PRN      traZODone 150 MG tablet  Commonly known as:  DESYREL   150 mg, Oral, Nightly             Discharge Diet:   Diet Instructions     Diet: Cardiac, Specialty Diet; Thin Liquids, No Restrictions; Low Sodium       Discharge Diet:   Cardiac  Specialty Diet       Fluid Consistency:  Thin Liquids, No Restrictions    Specialty Diets:  Low Sodium          Activity at Discharge:   Activity Instructions     Activity as Tolerated             Discharge Care Plan/Instructions: As above    Follow-up Appointments:   Future Appointments  Date Time Provider Department Center   8/10/2018 1:00 PM [facility] CT [facility]   9/17/2018 9:15 AM Filomena Hunt APRN [facility] None         This document has been electronically signed by MARLENI Painter on August 7, 2018 4:13 PM        Time: Greater than 30 minutes.

## 2018-08-07 NOTE — CONSULTS
Indication: diastolic dysfunction and HTN  Attached Cardiologist: Dr Bell     Current admission indication: Chest Pain    After reviewing EHR, it is felt that they are not appropriate for the  HF Program due to negative BNP and no indication of congestive heart failure. No evidence of any exacerbation of CHF in the last 4 years.      Lower extremity edema should continue to be treated per PCP or wound center. It has been present for many years.   Needs nursing to reinforce low sodium diet, leg elevation, and increased activity.     HTN management and other co -orbidities including YONI.    From Dr. Bell's last note:  4.  Chest pain  We will order nuclear perfusion imaging stress test.  Patient has a known nonobstructive coronary artery disease in the LAD and RCA.  Her last cardiac catheterization was 2013.  Robbie Bell MD  03/20/17  2:11 PM    This stress test was low risk for ischemic disease.       I would like to thank Dr. Anderson for this HF consultation.           This document has been electronically signed by MARLENI Porter on August 7, 2018 9:08 AM

## 2018-08-07 NOTE — PLAN OF CARE
Problem: Patient Care Overview  Goal: Plan of Care Review  Outcome: Ongoing (interventions implemented as appropriate)   08/07/18 6087   Coping/Psychosocial   Plan of Care Reviewed With patient   Plan of Care Review   Progress improving

## 2018-08-07 NOTE — CONSULTS
Adult Nutrition  Assessment    Patient Name:  Stephania Judd  YOB: 1940  MRN: [id number]  Admit Date:  8/6/2018    Assessment Date:  8/7/2018    Comments:  BMI 45 with pt at 221% IBW.  Making Lifestyle Choices for a Healthier Weight used to provide ed regarding Wt Loss Diet and diet copy given.  Low Sodium diet with 1500 fluid restriction prescribed during admission.  Mild Sodium Restricted Diet info used to provide ed regarding Low Sodium Diet and diet copy given.  Fluid restriction reviewed with pt.  Pt discharged.          Reason for Assessment     Row Name 08/07/18 1640          Reason for Assessment    Reason For Assessment per organizational policy   WT Loss Diet ed     Diagnosis other (see comments)   dx Obesity     Identified At Risk by Screening Criteria BMI;need for education                       Estimated/Assessed Needs     Row Name 08/07/18 1640          Calculation Measurements    Weight Used For Calculations 71.1 kg (156 lb 12 oz)        Estimated/Assessed Needs    Additional Documentation Calorie Requirements (Group);Fluid Requirements (Group);Wilkinson-St. Jeor Equation (Group);Protein Requirements (Group)        Calorie Requirements    Estimated Calorie Requirement (kcal/day) 1548     Estimated Calorie Need Method Wilkinson-St Jeor        KCAL/KG    14 Kcal/Kg (kcal) 995.4     15 Kcal/Kg (kcal) 1066.5     18 Kcal/Kg (kcal) 1279.8     20 Kcal/Kg (kcal) 1422     25 Kcal/Kg (kcal) 1777.5     30 Kcal/Kg (kcal) 2133     35 Kcal/Kg (kcal) 2488.5     40 Kcal/Kg (kcal) 2844     45 Kcal/Kg (kcal) 3199.5     50 Kcal/Kg (kcal) 3555        Wilkinson-St. Jeor Equation    RMR (Wilkinson-St. Jeor Equation) 1191.25        Protein Requirements    Est Protein Requirement Amount (gms/kg) 1.2 gm protein     Estimated Protein Requirements (gms/day) 85.32        Fluid Requirements    Estimated Fluid Requirements (mL/day) 1778     RDA Method (mL) 1778     Fabrice-Segar Method (over 20 kg) 6478                Evaluation of Received Nutrient/Fluid Intake     Row Name 08/07/18 1640          Calculation Measurements    Weight Used For Calculations 71.1 kg (156 lb 12 oz)             Evaluation of Prescribed Nutrient/Fluid Intake     Row Name 08/07/18 1640          Calculation Measurements    Weight Used For Calculations 71.1 kg (156 lb 12 oz)           Electronically signed by:  Elise Crawford RD  08/07/18 5:17 PM

## 2018-08-07 NOTE — PLAN OF CARE
Problem: Fall Risk (Adult)  Goal: Identify Related Risk Factors and Signs and Symptoms  Outcome: Outcome(s) achieved Date Met: 08/07/18

## 2018-08-07 NOTE — PLAN OF CARE
Problem: Patient Care Overview  Goal: Plan of Care Review  Outcome: Ongoing (interventions implemented as appropriate)    Goal: Individualization and Mutuality  Outcome: Ongoing (interventions implemented as appropriate)    Goal: Discharge Needs Assessment  Outcome: Ongoing (interventions implemented as appropriate)    Goal: Interprofessional Rounds/Family Conf  Outcome: Ongoing (interventions implemented as appropriate)      Problem: Pain, Chronic (Adult)  Goal: Identify Related Risk Factors and Signs and Symptoms  Outcome: Outcome(s) achieved Date Met: 08/07/18      Problem: Fluid Volume Excess (Adult)  Goal: Identify Related Risk Factors and Signs and Symptoms  Outcome: Outcome(s) achieved Date Met: 08/07/18    Goal: Optimal Fluid Balance  Outcome: Ongoing (interventions implemented as appropriate)      Problem: Cardiac: ACS (Acute Coronary Syndrome) (Adult)  Goal: Signs and Symptoms of Listed Potential Problems Will be Absent, Minimized or Managed (Cardiac: ACS)  Outcome: Ongoing (interventions implemented as appropriate)

## 2018-08-10 ENCOUNTER — HOSPITAL ENCOUNTER (OUTPATIENT)
Dept: CT IMAGING | Facility: HOSPITAL | Age: 78
End: 2018-08-10

## 2018-09-01 ENCOUNTER — APPOINTMENT (OUTPATIENT)
Dept: ULTRASOUND IMAGING | Facility: HOSPITAL | Age: 78
End: 2018-09-01

## 2018-09-01 ENCOUNTER — HOSPITAL ENCOUNTER (INPATIENT)
Facility: HOSPITAL | Age: 78
LOS: 5 days | Discharge: HOME HEALTH | End: 2018-09-07
Attending: FAMILY MEDICINE | Admitting: FAMILY MEDICINE

## 2018-09-01 DIAGNOSIS — Z74.09 IMPAIRED FUNCTIONAL MOBILITY, BALANCE, GAIT, AND ENDURANCE: ICD-10-CM

## 2018-09-01 DIAGNOSIS — E66.01 MORBID OBESITY DUE TO EXCESS CALORIES (HCC): ICD-10-CM

## 2018-09-01 DIAGNOSIS — L03.119 CELLULITIS OF LOWER EXTREMITY, UNSPECIFIED LATERALITY: Primary | ICD-10-CM

## 2018-09-01 DIAGNOSIS — N17.9 AKI (ACUTE KIDNEY INJURY) (HCC): ICD-10-CM

## 2018-09-01 LAB
ALBUMIN SERPL-MCNC: 3.5 G/DL (ref 3.4–4.8)
ALBUMIN/GLOB SERPL: 1.1 G/DL (ref 1.1–1.8)
ALP SERPL-CCNC: 76 U/L (ref 38–126)
ALT SERPL W P-5'-P-CCNC: 25 U/L (ref 9–52)
ANION GAP SERPL CALCULATED.3IONS-SCNC: 9 MMOL/L (ref 5–15)
AST SERPL-CCNC: 25 U/L (ref 14–36)
BASOPHILS # BLD AUTO: 0.01 10*3/MM3 (ref 0–0.2)
BASOPHILS NFR BLD AUTO: 0.1 % (ref 0–2)
BILIRUB SERPL-MCNC: 0.3 MG/DL (ref 0.2–1.3)
BUN BLD-MCNC: 33 MG/DL (ref 7–21)
BUN/CREAT SERPL: 25 (ref 7–25)
CALCIUM SPEC-SCNC: 9.1 MG/DL (ref 8.4–10.2)
CHLORIDE SERPL-SCNC: 103 MMOL/L (ref 95–110)
CO2 SERPL-SCNC: 29 MMOL/L (ref 22–31)
CREAT BLD-MCNC: 1.32 MG/DL (ref 0.5–1)
DEPRECATED RDW RBC AUTO: 54.5 FL (ref 36.4–46.3)
EOSINOPHIL # BLD AUTO: 0.71 10*3/MM3 (ref 0–0.7)
EOSINOPHIL NFR BLD AUTO: 7.8 % (ref 0–7)
ERYTHROCYTE [DISTWIDTH] IN BLOOD BY AUTOMATED COUNT: 18.3 % (ref 11.5–14.5)
GFR SERPL CREATININE-BSD FRML MDRD: 39 ML/MIN/1.73 (ref 39–90)
GLOBULIN UR ELPH-MCNC: 3.3 GM/DL (ref 2.3–3.5)
GLUCOSE BLD-MCNC: 106 MG/DL (ref 60–100)
HCT VFR BLD AUTO: 29.9 % (ref 35–45)
HGB BLD-MCNC: 8.9 G/DL (ref 12–15.5)
IMM GRANULOCYTES # BLD: 0.03 10*3/MM3 (ref 0–0.02)
IMM GRANULOCYTES NFR BLD: 0.3 % (ref 0–0.5)
LYMPHOCYTES # BLD AUTO: 2.01 10*3/MM3 (ref 0.6–4.2)
LYMPHOCYTES NFR BLD AUTO: 21.9 % (ref 10–50)
MCH RBC QN AUTO: 24.1 PG (ref 26.5–34)
MCHC RBC AUTO-ENTMCNC: 29.8 G/DL (ref 31.4–36)
MCV RBC AUTO: 81 FL (ref 80–98)
MONOCYTES # BLD AUTO: 1.14 10*3/MM3 (ref 0–0.9)
MONOCYTES NFR BLD AUTO: 12.4 % (ref 0–12)
NEUTROPHILS # BLD AUTO: 5.26 10*3/MM3 (ref 2–8.6)
NEUTROPHILS NFR BLD AUTO: 57.5 % (ref 37–80)
PLATELET # BLD AUTO: 299 10*3/MM3 (ref 150–450)
PMV BLD AUTO: 9.2 FL (ref 8–12)
POTASSIUM BLD-SCNC: 4.1 MMOL/L (ref 3.5–5.1)
PROT SERPL-MCNC: 6.8 G/DL (ref 6.3–8.6)
RBC # BLD AUTO: 3.69 10*6/MM3 (ref 3.77–5.16)
SODIUM BLD-SCNC: 141 MMOL/L (ref 137–145)
WBC NRBC COR # BLD: 9.16 10*3/MM3 (ref 3.2–9.8)

## 2018-09-01 PROCEDURE — 25010000002 LEVOFLOXACIN PER 250 MG: Performed by: FAMILY MEDICINE

## 2018-09-01 PROCEDURE — 87077 CULTURE AEROBIC IDENTIFY: CPT | Performed by: FAMILY MEDICINE

## 2018-09-01 PROCEDURE — 99284 EMERGENCY DEPT VISIT MOD MDM: CPT

## 2018-09-01 PROCEDURE — G0378 HOSPITAL OBSERVATION PER HR: HCPCS

## 2018-09-01 PROCEDURE — 93970 EXTREMITY STUDY: CPT

## 2018-09-01 PROCEDURE — 25010000002 PIPERACILLIN SOD-TAZOBACTAM PER 1 G: Performed by: NURSE PRACTITIONER

## 2018-09-01 PROCEDURE — 87205 SMEAR GRAM STAIN: CPT | Performed by: FAMILY MEDICINE

## 2018-09-01 PROCEDURE — 87070 CULTURE OTHR SPECIMN AEROBIC: CPT | Performed by: FAMILY MEDICINE

## 2018-09-01 PROCEDURE — 25010000002 ENOXAPARIN PER 10 MG: Performed by: FAMILY MEDICINE

## 2018-09-01 PROCEDURE — 87147 CULTURE TYPE IMMUNOLOGIC: CPT | Performed by: FAMILY MEDICINE

## 2018-09-01 PROCEDURE — 85025 COMPLETE CBC W/AUTO DIFF WBC: CPT | Performed by: NURSE PRACTITIONER

## 2018-09-01 PROCEDURE — 80053 COMPREHEN METABOLIC PANEL: CPT | Performed by: NURSE PRACTITIONER

## 2018-09-01 PROCEDURE — 82728 ASSAY OF FERRITIN: CPT | Performed by: FAMILY MEDICINE

## 2018-09-01 PROCEDURE — 87186 SC STD MICRODIL/AGAR DIL: CPT | Performed by: FAMILY MEDICINE

## 2018-09-01 RX ORDER — GABAPENTIN 100 MG/1
200 CAPSULE ORAL NIGHTLY
Status: DISCONTINUED | OUTPATIENT
Start: 2018-09-01 | End: 2018-09-07 | Stop reason: HOSPADM

## 2018-09-01 RX ORDER — HYDROXYZINE HCL 10 MG/5 ML
10 SOLUTION, ORAL ORAL 3 TIMES DAILY PRN
Status: DISCONTINUED | OUTPATIENT
Start: 2018-09-01 | End: 2018-09-07 | Stop reason: HOSPADM

## 2018-09-01 RX ORDER — HYDROCODONE BITARTRATE AND ACETAMINOPHEN 10; 325 MG/1; MG/1
1 TABLET ORAL EVERY 12 HOURS PRN
Status: DISCONTINUED | OUTPATIENT
Start: 2018-09-01 | End: 2018-09-07 | Stop reason: HOSPADM

## 2018-09-01 RX ORDER — MORPHINE SULFATE 2 MG/ML
1 INJECTION, SOLUTION INTRAMUSCULAR; INTRAVENOUS EVERY 4 HOURS PRN
Status: DISCONTINUED | OUTPATIENT
Start: 2018-09-01 | End: 2018-09-07 | Stop reason: HOSPADM

## 2018-09-01 RX ORDER — PRAMIPEXOLE DIHYDROCHLORIDE 0.25 MG/1
0.25 TABLET ORAL NIGHTLY
Status: DISCONTINUED | OUTPATIENT
Start: 2018-09-01 | End: 2018-09-07 | Stop reason: HOSPADM

## 2018-09-01 RX ORDER — SODIUM CHLORIDE 9 MG/ML
50 INJECTION, SOLUTION INTRAVENOUS CONTINUOUS
Status: ACTIVE | OUTPATIENT
Start: 2018-09-01 | End: 2018-09-02

## 2018-09-01 RX ORDER — TRAZODONE HYDROCHLORIDE 100 MG/1
200 TABLET ORAL NIGHTLY
Status: DISCONTINUED | OUTPATIENT
Start: 2018-09-01 | End: 2018-09-07 | Stop reason: HOSPADM

## 2018-09-01 RX ORDER — DILTIAZEM HYDROCHLORIDE 120 MG/1
120 CAPSULE, COATED, EXTENDED RELEASE ORAL
Status: DISCONTINUED | OUTPATIENT
Start: 2018-09-02 | End: 2018-09-07 | Stop reason: HOSPADM

## 2018-09-01 RX ORDER — ALBUTEROL SULFATE 2.5 MG/3ML
2.5 SOLUTION RESPIRATORY (INHALATION) EVERY 4 HOURS PRN
Status: DISCONTINUED | OUTPATIENT
Start: 2018-09-01 | End: 2018-09-07 | Stop reason: HOSPADM

## 2018-09-01 RX ORDER — DOXYCYCLINE HYCLATE 100 MG/1
100 CAPSULE ORAL 2 TIMES DAILY
COMMUNITY
Start: 2018-08-31 | End: 2018-09-07 | Stop reason: HOSPADM

## 2018-09-01 RX ORDER — PANTOPRAZOLE SODIUM 40 MG/1
40 TABLET, DELAYED RELEASE ORAL
Status: DISCONTINUED | OUTPATIENT
Start: 2018-09-02 | End: 2018-09-07 | Stop reason: HOSPADM

## 2018-09-01 RX ORDER — SUCRALFATE 1 G/1
1 TABLET ORAL
Status: DISCONTINUED | OUTPATIENT
Start: 2018-09-02 | End: 2018-09-07 | Stop reason: HOSPADM

## 2018-09-01 RX ORDER — ATORVASTATIN CALCIUM 10 MG/1
10 TABLET, FILM COATED ORAL DAILY
Status: DISCONTINUED | OUTPATIENT
Start: 2018-09-02 | End: 2018-09-07 | Stop reason: HOSPADM

## 2018-09-01 RX ORDER — LATANOPROST 50 UG/ML
1 SOLUTION/ DROPS OPHTHALMIC NIGHTLY
Status: DISCONTINUED | OUTPATIENT
Start: 2018-09-01 | End: 2018-09-07 | Stop reason: HOSPADM

## 2018-09-01 RX ORDER — POTASSIUM CHLORIDE 750 MG/1
10 CAPSULE, EXTENDED RELEASE ORAL DAILY
Status: DISCONTINUED | OUTPATIENT
Start: 2018-09-02 | End: 2018-09-07 | Stop reason: HOSPADM

## 2018-09-01 RX ORDER — LEVOFLOXACIN 5 MG/ML
500 INJECTION, SOLUTION INTRAVENOUS EVERY 24 HOURS
Status: DISCONTINUED | OUTPATIENT
Start: 2018-09-01 | End: 2018-09-03

## 2018-09-01 RX ORDER — HYDROCHLOROTHIAZIDE 25 MG/1
25 TABLET ORAL EVERY MORNING
Status: DISCONTINUED | OUTPATIENT
Start: 2018-09-02 | End: 2018-09-04

## 2018-09-01 RX ORDER — ISOSORBIDE MONONITRATE 30 MG/1
30 TABLET, EXTENDED RELEASE ORAL EVERY MORNING
Status: DISCONTINUED | OUTPATIENT
Start: 2018-09-02 | End: 2018-09-07 | Stop reason: HOSPADM

## 2018-09-01 RX ORDER — SODIUM CHLORIDE 0.9 % (FLUSH) 0.9 %
1-10 SYRINGE (ML) INJECTION AS NEEDED
Status: DISCONTINUED | OUTPATIENT
Start: 2018-09-01 | End: 2018-09-07 | Stop reason: HOSPADM

## 2018-09-01 RX ORDER — ASPIRIN 81 MG/1
81 TABLET, CHEWABLE ORAL EVERY MORNING
Status: DISCONTINUED | OUTPATIENT
Start: 2018-09-02 | End: 2018-09-07 | Stop reason: HOSPADM

## 2018-09-01 RX ORDER — TIZANIDINE 2 MG/1
2 TABLET ORAL 2 TIMES DAILY PRN
Status: DISCONTINUED | OUTPATIENT
Start: 2018-09-01 | End: 2018-09-07 | Stop reason: HOSPADM

## 2018-09-01 RX ORDER — MECLIZINE HCL 12.5 MG/1
12.5 TABLET ORAL EVERY 12 HOURS PRN
Status: DISCONTINUED | OUTPATIENT
Start: 2018-09-01 | End: 2018-09-07 | Stop reason: HOSPADM

## 2018-09-01 RX ORDER — HYDROXYZINE HYDROCHLORIDE 10 MG/1
10 TABLET, FILM COATED ORAL 3 TIMES DAILY PRN
COMMUNITY

## 2018-09-01 RX ORDER — LISINOPRIL 40 MG/1
40 TABLET ORAL EVERY MORNING
Status: DISCONTINUED | OUTPATIENT
Start: 2018-09-02 | End: 2018-09-04

## 2018-09-01 RX ORDER — NALOXONE HCL 0.4 MG/ML
0.4 VIAL (ML) INJECTION
Status: DISCONTINUED | OUTPATIENT
Start: 2018-09-01 | End: 2018-09-07 | Stop reason: HOSPADM

## 2018-09-01 RX ORDER — DULOXETIN HYDROCHLORIDE 60 MG/1
60 CAPSULE, DELAYED RELEASE ORAL EVERY MORNING
Status: DISCONTINUED | OUTPATIENT
Start: 2018-09-02 | End: 2018-09-07 | Stop reason: HOSPADM

## 2018-09-01 RX ORDER — BUMETANIDE 1 MG/1
1 TABLET ORAL EVERY MORNING
Status: DISCONTINUED | OUTPATIENT
Start: 2018-09-02 | End: 2018-09-04

## 2018-09-01 RX ORDER — SODIUM CHLORIDE 0.9 % (FLUSH) 0.9 %
10 SYRINGE (ML) INJECTION AS NEEDED
Status: DISCONTINUED | OUTPATIENT
Start: 2018-09-01 | End: 2018-09-07 | Stop reason: HOSPADM

## 2018-09-01 RX ADMIN — LATANOPROST 1 DROP: 50 SOLUTION OPHTHALMIC at 22:49

## 2018-09-01 RX ADMIN — HYDROCODONE BITARTRATE AND ACETAMINOPHEN 1 TABLET: 10; 325 TABLET ORAL at 22:20

## 2018-09-01 RX ADMIN — TRAZODONE HYDROCHLORIDE 200 MG: 100 TABLET ORAL at 22:49

## 2018-09-01 RX ADMIN — Medication 10 ML: at 19:23

## 2018-09-01 RX ADMIN — PIPERACILLIN SODIUM,TAZOBACTAM SODIUM 3.38 G: 3; .375 INJECTION, POWDER, FOR SOLUTION INTRAVENOUS at 19:23

## 2018-09-01 RX ADMIN — ENOXAPARIN SODIUM 40 MG: 40 INJECTION SUBCUTANEOUS at 22:49

## 2018-09-01 RX ADMIN — METOPROLOL TARTRATE 25 MG: 25 TABLET ORAL at 22:49

## 2018-09-01 RX ADMIN — LEVOFLOXACIN 500 MG: 5 INJECTION, SOLUTION INTRAVENOUS at 22:49

## 2018-09-01 RX ADMIN — SODIUM CHLORIDE 50 ML/HR: 9 INJECTION, SOLUTION INTRAVENOUS at 22:19

## 2018-09-01 RX ADMIN — PRAMIPEXOLE DIHYDROCHLORIDE 0.25 MG: 0.25 TABLET ORAL at 22:49

## 2018-09-01 RX ADMIN — GABAPENTIN 200 MG: 100 CAPSULE ORAL at 22:19

## 2018-09-01 NOTE — ED PROVIDER NOTES
Subjective   77-year-old female reports emergency department today complaining of bilateral leg swelling and some skin breakdown.  She is seen by home health and has been treated with antibiotics for some cellulitis.  The patient was recently changed from Unna boots to some other type of dressing.  The granddaughter bedside reports that she came over today and the Ace wrap was cutting into her leg.  The patient has some cellulitis noted on both lower extremities, some pedal edema noted worse on the left lower history the right.  Pulses intact.          History provided by:  Patient   used: No        Review of Systems   Constitutional: Negative for activity change, chills, fatigue and fever.   HENT: Negative for congestion, ear pain, hearing loss, mouth sores, nosebleeds, postnasal drip, sinus pain, sinus pressure and voice change.    Respiratory: Negative for apnea, cough and wheezing.    Cardiovascular: Negative for chest pain and palpitations.   Gastrointestinal: Negative for abdominal distention, abdominal pain, constipation, nausea and vomiting.   Endocrine: Negative for cold intolerance.   Musculoskeletal: Positive for arthralgias, gait problem and joint swelling.   Skin: Positive for wound. Negative for rash.   Allergic/Immunologic: Negative for immunocompromised state.   Neurological: Negative for dizziness, weakness and numbness.   Hematological: Negative for adenopathy.   Psychiatric/Behavioral: Negative for confusion.   All other systems reviewed and are negative.      Past Medical History:   Diagnosis Date   • Arthritis    • Bronchitis    • Carotid artery stenosis     5 years ago, pt was medically managed w/ baby aspirin.   • Carpal tunnel syndrome    • Chest pain    • CHF (congestive heart failure) (CMS/[facility])    • GERD (gastroesophageal reflux disease)    • Glaucoma    • Hyperlipidemia    • Hypertension    • Pulmonary hypertension    • Sleep apnea        Allergies   Allergen Reactions    • Morphine And Related Nausea And Vomiting       Past Surgical History:   Procedure Laterality Date   • BACK SURGERY     • BREAST LUMPECTOMY      right side   • CATARACT EXTRACTION     • COLONOSCOPY     • COLONOSCOPY N/A 2/5/2018    Procedure: COLONOSCOPY;  Surgeon: Josesito Garcia MD;  Location: [facility] ENDOSCOPY;  Service:    • ENDOSCOPY N/A 2/5/2018    Procedure: ESOPHAGOGASTRODUODENOSCOPY;  Surgeon: Josesito Garcia MD;  Location: [facility] ENDOSCOPY;  Service:    • HYSTERECTOMY      partial   • TONSILLECTOMY         Family History   Problem Relation Age of Onset   • Heart disease Mother    • Hypertension Mother    • Hypertension Sister    • Diabetes Sister    • Heart disease Brother    • Hypertension Brother    • Diabetes Brother    • Heart disease Brother    • Hypertension Brother    • Diabetes Brother        Social History     Social History   • Marital status:      Social History Main Topics   • Smoking status: Former Smoker   • Smokeless tobacco: Never Used      Comment: quit 50 + years ago    • Alcohol use No   • Drug use: No   • Sexual activity: Defer     Other Topics Concern   • Not on file           Objective   Physical Exam   Constitutional: She is oriented to person, place, and time. Vital signs are normal. She appears well-developed and well-nourished.   HENT:   Head: Normocephalic.   Nose: Nose normal.   Eyes: Pupils are equal, round, and reactive to light. Conjunctivae are normal.   Neck: Normal range of motion.   Cardiovascular: Normal rate, regular rhythm and normal heart sounds.    Pulmonary/Chest: Effort normal and breath sounds normal.   Abdominal: Soft.   Musculoskeletal:        Right lower leg: She exhibits tenderness, swelling and edema.        Left lower leg: She exhibits tenderness, swelling and edema.   Neurological: She is alert and oriented to person, place, and time. GCS eye subscore is 4. GCS verbal subscore is 5. GCS motor subscore is 6.   Skin: Skin is warm and dry.    Psychiatric: She has a normal mood and affect.   Nursing note and vitals reviewed.      Procedures           ED Course    ..  US Venous Doppler Lower Extremity Bilateral (duplex)   Final Result   CONCLUSION:     1. Negative examination - no evidence of deep venous thrombosis   within the femoral-popliteal system of the bilateral lower   extremity(ies).                      Electronically signed by:  ISAI Lawrence MD  9/1/2018 7:09 PM   CDT Workstation: [id number]      \  ..  Results for orders placed or performed during the hospital encounter of 09/01/18   Comprehensive Metabolic Panel   Result Value Ref Range    Glucose 106 (H) 60 - 100 mg/dL    BUN 33 (H) 7 - 21 mg/dL    Creatinine 1.32 (H) 0.50 - 1.00 mg/dL    Sodium 141 137 - 145 mmol/L    Potassium 4.1 3.5 - 5.1 mmol/L    Chloride 103 95 - 110 mmol/L    CO2 29.0 22.0 - 31.0 mmol/L    Calcium 9.1 8.4 - 10.2 mg/dL    Total Protein 6.8 6.3 - 8.6 g/dL    Albumin 3.50 3.40 - 4.80 g/dL    ALT (SGPT) 25 9 - 52 U/L    AST (SGOT) 25 14 - 36 U/L    Alkaline Phosphatase 76 38 - 126 U/L    Total Bilirubin 0.3 0.2 - 1.3 mg/dL    eGFR Non African Amer 39 39 - 90 mL/min/1.73    Globulin 3.3 2.3 - 3.5 gm/dL    A/G Ratio 1.1 1.1 - 1.8 g/dL    BUN/Creatinine Ratio 25.0 7.0 - 25.0    Anion Gap 9.0 5.0 - 15.0 mmol/L   CBC Auto Differential   Result Value Ref Range    WBC 9.16 3.20 - 9.80 10*3/mm3    RBC 3.69 (L) 3.77 - 5.16 10*6/mm3    Hemoglobin 8.9 (L) 12.0 - 15.5 g/dL    Hematocrit 29.9 (L) 35.0 - 45.0 %    MCV 81.0 80.0 - 98.0 fL    MCH 24.1 (L) 26.5 - 34.0 pg    MCHC 29.8 (L) 31.4 - 36.0 g/dL    RDW 18.3 (H) 11.5 - 14.5 %    RDW-SD 54.5 (H) 36.4 - 46.3 fl    MPV 9.2 8.0 - 12.0 fL    Platelets 299 150 - 450 10*3/mm3    Neutrophil % 57.5 37.0 - 80.0 %    Lymphocyte % 21.9 10.0 - 50.0 %    Monocyte % 12.4 (H) 0.0 - 12.0 %    Eosinophil % 7.8 (H) 0.0 - 7.0 %    Basophil % 0.1 0.0 - 2.0 %    Immature Grans % 0.3 0.0 - 0.5 %    Neutrophils, Absolute 5.26 2.00 - 8.60 10*3/mm3     Lymphocytes, Absolute 2.01 0.60 - 4.20 10*3/mm3    Monocytes, Absolute 1.14 (H) 0.00 - 0.90 10*3/mm3    Eosinophils, Absolute 0.71 (H) 0.00 - 0.70 10*3/mm3    Basophils, Absolute 0.01 0.00 - 0.20 10*3/mm3    Immature Grans, Absolute 0.03 (H) 0.00 - 0.02 10*3/mm3               MDM      Final diagnoses:   Cellulitis of lower extremity, unspecified laterality   AKBAR (acute kidney injury) (CMS/[facility])            Ghanshyam Conrad, APRN  09/01/18 2045

## 2018-09-02 LAB
ALBUMIN SERPL-MCNC: 3 G/DL (ref 3.4–4.8)
ALBUMIN/GLOB SERPL: 1 G/DL (ref 1.1–1.8)
ALP SERPL-CCNC: 72 U/L (ref 38–126)
ALT SERPL W P-5'-P-CCNC: 22 U/L (ref 9–52)
ANION GAP SERPL CALCULATED.3IONS-SCNC: 5 MMOL/L (ref 5–15)
AST SERPL-CCNC: 31 U/L (ref 14–36)
BASOPHILS # BLD AUTO: 0.02 10*3/MM3 (ref 0–0.2)
BASOPHILS NFR BLD AUTO: 0.2 % (ref 0–2)
BILIRUB SERPL-MCNC: 0.2 MG/DL (ref 0.2–1.3)
BUN BLD-MCNC: 33 MG/DL (ref 7–21)
BUN/CREAT SERPL: 29.7 (ref 7–25)
CALCIUM SPEC-SCNC: 8.5 MG/DL (ref 8.4–10.2)
CHLORIDE SERPL-SCNC: 105 MMOL/L (ref 95–110)
CO2 SERPL-SCNC: 28 MMOL/L (ref 22–31)
CREAT BLD-MCNC: 1.11 MG/DL (ref 0.5–1)
DEPRECATED RDW RBC AUTO: 54.8 FL (ref 36.4–46.3)
EOSINOPHIL # BLD AUTO: 0.84 10*3/MM3 (ref 0–0.7)
EOSINOPHIL NFR BLD AUTO: 10 % (ref 0–7)
ERYTHROCYTE [DISTWIDTH] IN BLOOD BY AUTOMATED COUNT: 18.6 % (ref 11.5–14.5)
FERRITIN SERPL-MCNC: 18.1 NG/ML (ref 11.1–264)
FOLATE SERPL-MCNC: 9.04 NG/ML (ref 2.76–21)
GFR SERPL CREATININE-BSD FRML MDRD: 48 ML/MIN/1.73 (ref 39–90)
GLOBULIN UR ELPH-MCNC: 3 GM/DL (ref 2.3–3.5)
GLUCOSE BLD-MCNC: 97 MG/DL (ref 60–100)
HCT VFR BLD AUTO: 27.3 % (ref 35–45)
HGB BLD-MCNC: 8 G/DL (ref 12–15.5)
IMM GRANULOCYTES # BLD: 0.02 10*3/MM3 (ref 0–0.02)
IMM GRANULOCYTES NFR BLD: 0.2 % (ref 0–0.5)
LYMPHOCYTES # BLD AUTO: 1.99 10*3/MM3 (ref 0.6–4.2)
LYMPHOCYTES NFR BLD AUTO: 23.7 % (ref 10–50)
MCH RBC QN AUTO: 23.8 PG (ref 26.5–34)
MCHC RBC AUTO-ENTMCNC: 29.3 G/DL (ref 31.4–36)
MCV RBC AUTO: 81.3 FL (ref 80–98)
MONOCYTES # BLD AUTO: 1.03 10*3/MM3 (ref 0–0.9)
MONOCYTES NFR BLD AUTO: 12.3 % (ref 0–12)
NEUTROPHILS # BLD AUTO: 4.49 10*3/MM3 (ref 2–8.6)
NEUTROPHILS NFR BLD AUTO: 53.6 % (ref 37–80)
NRBC BLD MANUAL-RTO: 0 /100 WBC (ref 0–0)
PLATELET # BLD AUTO: 290 10*3/MM3 (ref 150–450)
PMV BLD AUTO: 9.8 FL (ref 8–12)
POTASSIUM BLD-SCNC: 4.1 MMOL/L (ref 3.5–5.1)
PROT SERPL-MCNC: 6 G/DL (ref 6.3–8.6)
RBC # BLD AUTO: 3.36 10*6/MM3 (ref 3.77–5.16)
SODIUM BLD-SCNC: 138 MMOL/L (ref 137–145)
VIT B12 BLD-MCNC: <159 PG/ML (ref 239–931)
WBC NRBC COR # BLD: 8.39 10*3/MM3 (ref 3.2–9.8)

## 2018-09-02 PROCEDURE — 82607 VITAMIN B-12: CPT | Performed by: FAMILY MEDICINE

## 2018-09-02 PROCEDURE — 80053 COMPREHEN METABOLIC PANEL: CPT | Performed by: FAMILY MEDICINE

## 2018-09-02 PROCEDURE — 25010000002 CYANOCOBALAMIN PER 1000 MCG: Performed by: NURSE PRACTITIONER

## 2018-09-02 PROCEDURE — 82746 ASSAY OF FOLIC ACID SERUM: CPT | Performed by: FAMILY MEDICINE

## 2018-09-02 PROCEDURE — 25010000002 LEVOFLOXACIN PER 250 MG: Performed by: FAMILY MEDICINE

## 2018-09-02 PROCEDURE — 25010000002 ENOXAPARIN PER 10 MG: Performed by: FAMILY MEDICINE

## 2018-09-02 PROCEDURE — 85025 COMPLETE CBC W/AUTO DIFF WBC: CPT | Performed by: FAMILY MEDICINE

## 2018-09-02 RX ORDER — CYANOCOBALAMIN 1000 UG/ML
1000 INJECTION, SOLUTION INTRAMUSCULAR; SUBCUTANEOUS
Status: DISCONTINUED | OUTPATIENT
Start: 2018-09-02 | End: 2018-09-07 | Stop reason: HOSPADM

## 2018-09-02 RX ORDER — DIPHENHYDRAMINE HYDROCHLORIDE, ZINC ACETATE 2; .1 G/100G; G/100G
CREAM TOPICAL 3 TIMES DAILY PRN
Status: DISCONTINUED | OUTPATIENT
Start: 2018-09-02 | End: 2018-09-07 | Stop reason: HOSPADM

## 2018-09-02 RX ADMIN — ISOSORBIDE MONONITRATE 30 MG: 30 TABLET, EXTENDED RELEASE ORAL at 06:19

## 2018-09-02 RX ADMIN — PANTOPRAZOLE SODIUM 40 MG: 40 TABLET, DELAYED RELEASE ORAL at 06:19

## 2018-09-02 RX ADMIN — POTASSIUM CHLORIDE 10 MEQ: 750 CAPSULE, EXTENDED RELEASE ORAL at 09:57

## 2018-09-02 RX ADMIN — LATANOPROST 1 DROP: 50 SOLUTION OPHTHALMIC at 22:15

## 2018-09-02 RX ADMIN — ENOXAPARIN SODIUM 40 MG: 40 INJECTION SUBCUTANEOUS at 22:15

## 2018-09-02 RX ADMIN — METOPROLOL TARTRATE 25 MG: 25 TABLET ORAL at 09:57

## 2018-09-02 RX ADMIN — LEVOFLOXACIN 500 MG: 5 INJECTION, SOLUTION INTRAVENOUS at 22:15

## 2018-09-02 RX ADMIN — DILTIAZEM HYDROCHLORIDE 120 MG: 120 CAPSULE, COATED, EXTENDED RELEASE ORAL at 09:57

## 2018-09-02 RX ADMIN — HYDROCHLOROTHIAZIDE 25 MG: 25 TABLET ORAL at 06:19

## 2018-09-02 RX ADMIN — HYDROCODONE BITARTRATE AND ACETAMINOPHEN 1 TABLET: 10; 325 TABLET ORAL at 22:21

## 2018-09-02 RX ADMIN — DULOXETINE HYDROCHLORIDE 60 MG: 60 CAPSULE, DELAYED RELEASE ORAL at 06:19

## 2018-09-02 RX ADMIN — METOPROLOL TARTRATE 25 MG: 25 TABLET ORAL at 22:14

## 2018-09-02 RX ADMIN — GABAPENTIN 200 MG: 100 CAPSULE ORAL at 22:15

## 2018-09-02 RX ADMIN — PRAMIPEXOLE DIHYDROCHLORIDE 0.25 MG: 0.25 TABLET ORAL at 22:14

## 2018-09-02 RX ADMIN — TRAZODONE HYDROCHLORIDE 200 MG: 100 TABLET ORAL at 22:14

## 2018-09-02 RX ADMIN — BUMETANIDE 1 MG: 1 TABLET ORAL at 06:19

## 2018-09-02 RX ADMIN — ASPIRIN 81 MG 81 MG: 81 TABLET ORAL at 06:19

## 2018-09-02 RX ADMIN — SUCRALFATE 1 G: 1 TABLET ORAL at 16:55

## 2018-09-02 RX ADMIN — DIPHENHYDRAMINE HYDROCHLORIDE, ZINC ACETATE: 2; .1 CREAM TOPICAL at 16:55

## 2018-09-02 RX ADMIN — ATORVASTATIN CALCIUM 10 MG: 10 TABLET, FILM COATED ORAL at 09:57

## 2018-09-02 RX ADMIN — CYANOCOBALAMIN 1000 MCG: 1000 INJECTION, SOLUTION INTRAMUSCULAR; SUBCUTANEOUS at 11:26

## 2018-09-02 RX ADMIN — LISINOPRIL 40 MG: 40 TABLET ORAL at 06:19

## 2018-09-02 NOTE — H&P
[address] [address]  T - [id number]     H&P         SUBJECTIVE:   Patient Care Team:  Provider, No Known as PCP - Dorian Kwong APRN as PCP - Claims Attributed    Chief Complaint:     Chief Complaint   Patient presents with   • Leg Swelling   • leg redness   • leg wounds       Patient is 77 y.o. female presents with past medical history of essential hypertension, hyperlipidemia, morbid obesity, presented to the ED with a complaint of bilateral lower extremity swelling and erythema and weeping.  Patient has been treated by MARIBEL yates you was started on doxycycline to days ago however her symptoms did not improve.  Patient also had Unna boot placement which was tight enough that was causing her circulation to cut off.  Unna boot was taken off at home and patient came to the ER because of weeping crusted wounds.  Patient denies any other symptom at this point.    HPI     ROS/HISTORY/ CURRENT MEDICATIONS/OBJECTIVE/VS/PE:   Review of Systems:   Review of Systems   Constitutional: Positive for activity change, appetite change and fatigue. Negative for chills and fever.   HENT: Negative for congestion and rhinorrhea.    Respiratory: Negative for chest tightness, shortness of breath and wheezing.    Cardiovascular: Positive for leg swelling. Negative for chest pain and palpitations.   Gastrointestinal: Negative for abdominal pain, blood in stool, constipation, diarrhea, nausea and vomiting.   Genitourinary: Negative for dysuria, frequency and urgency.   Musculoskeletal: Positive for back pain and gait problem.   Skin: Positive for color change, rash and wound.   Neurological: Negative for dizziness and light-headedness.   Psychiatric/Behavioral: Negative for agitation, behavioral problems and confusion.       History:     Past Medical History:   Diagnosis Date   • Arthritis    • Bronchitis    • Carotid artery stenosis     5 years ago, pt was medically managed  w/ baby aspirin.   • Carpal tunnel syndrome    • Chest pain    • CHF (congestive heart failure) (CMS/HCC)    • GERD (gastroesophageal reflux disease)    • Glaucoma    • Hyperlipidemia    • Hypertension    • Pulmonary hypertension    • Sleep apnea      Past Surgical History:   Procedure Laterality Date   • BACK SURGERY     • BREAST LUMPECTOMY      right side   • CATARACT EXTRACTION     • COLONOSCOPY     • COLONOSCOPY N/A 2/5/2018    Procedure: COLONOSCOPY;  Surgeon: Josesito Garcia MD;  Location: [facility] ENDOSCOPY;  Service:    • ENDOSCOPY N/A 2/5/2018    Procedure: ESOPHAGOGASTRODUODENOSCOPY;  Surgeon: Josesito Garcia MD;  Location: [facility] ENDOSCOPY;  Service:    • HYSTERECTOMY      partial   • TONSILLECTOMY       Family History   Problem Relation Age of Onset   • Heart disease Mother    • Hypertension Mother    • Hypertension Sister    • Diabetes Sister    • Heart disease Brother    • Hypertension Brother    • Diabetes Brother    • Heart disease Brother    • Hypertension Brother    • Diabetes Brother      Social History   Substance Use Topics   • Smoking status: Former Smoker   • Smokeless tobacco: Never Used      Comment: quit 50 + years ago    • Alcohol use No       (Not in a hospital admission)  Allergies:  Morphine and related    Current Medications:     Current Facility-Administered Medications   Medication Dose Route Frequency Provider Last Rate Last Dose   • sodium chloride 0.9 % flush 10 mL  10 mL Intravenous PRN Ghanshyam Conrad APRN   10 mL at 09/01/18 1923     Current Outpatient Prescriptions   Medication Sig Dispense Refill   • albuterol (VENTOLIN HFA) 108 (90 BASE) MCG/ACT inhaler 2 puffs every 4 hours as needed for breathing 1 inhaler 5   • aspirin 81 MG chewable tablet Chew 81 mg Every Morning.     • bumetanide (BUMEX) 1 MG tablet Take 1 tablet by mouth Every Morning.     • Cholecalciferol 1000 units tablet Take 1,000 Units by mouth Every Morning.     • diltiaZEM (TAZTIA XT) 360 MG 24 hr capsule Take  360 mg by mouth Every Night.     • doxycycline (VIBRAMYCIN) 100 MG capsule Take 100 mg by mouth 2 (Two) Times a Day.     • DULoxetine (CYMBALTA) 60 MG capsule Take 60 mg by mouth Every Morning.     • gabapentin (NEURONTIN) 100 MG capsule Take 200 mg by mouth Every Night.     • hydrochlorothiazide (HYDRODIURIL) 25 MG tablet Take 25 mg by mouth Every Morning.     • HYDROcodone-acetaminophen (NORCO)  MG per tablet Take 1 tablet by mouth Every 12 (Twelve) Hours As Needed for Moderate Pain .     • hydrOXYzine (ATARAX) 10 MG tablet Take 10 mg by mouth 3 (Three) Times a Day As Needed for Itching.     • isosorbide mononitrate (IMDUR) 30 MG 24 hr tablet Take 30 mg by mouth Every Morning.     • latanoprost (XALATAN) 0.005 % ophthalmic solution Administer 1 drop to both eyes Every Night.     • lisinopril (PRINIVIL,ZESTRIL) 20 MG tablet Take 40 mg by mouth Every Morning.     • meclizine (ANTIVERT) 12.5 MG tablet Take 12.5 mg by mouth Every 12 (Twelve) Hours As Needed for dizziness.     • metoprolol tartrate (LOPRESSOR) 25 MG tablet Take 25 mg by mouth 2 (Two) Times a Day.     • pantoprazole (PROTONIX) 40 MG EC tablet Take 1 tablet by mouth Daily. (Patient taking differently: Take 40 mg by mouth Every Morning.) 90 tablet 3   • potassium chloride (K-DUR) 10 MEQ CR tablet Take 10 mEq by mouth Every Night.     • pramipexole (MIRAPEX) 0.25 MG tablet Take 0.25 mg by mouth Every Night.     • pravastatin (PRAVACHOL) 20 MG tablet Take 20 mg by mouth Every Night.     • sucralfate (CARAFATE) 1 g tablet Take 1 g by mouth 2 (Two) Times a Day.     • tiZANidine (ZANAFLEX) 4 MG tablet Take 2 mg by mouth 2 (Two) Times a Day As Needed for Muscle Spasms.     • traZODone (DESYREL) 100 MG tablet Take 200 mg by mouth Every Night.         Physical Exam:     Vital Sign Min/Max for last 24 hours  Temp  Min: 98.3 °F (36.8 °C)  Max: 98.3 °F (36.8 °C)   BP  Min: 144/63  Max: 164/72   Pulse  Min: 73  Max: 84   Resp  Min: 18  Max: 20   SpO2  Min: 95 %   Max: 95 %   No Data Recorded   Weight  Min: 122 kg (268 lb 6 oz)  Max: 122 kg (268 lb 6 oz)       Physical Exam:    Physical Exam   Constitutional: She is oriented to person, place, and time. She appears well-developed and well-nourished.   HENT:   Head: Normocephalic and atraumatic.   Eyes: Pupils are equal, round, and reactive to light. EOM are normal.   Neck: Normal range of motion.   Cardiovascular: Normal rate, regular rhythm and normal heart sounds.    Pulmonary/Chest: Effort normal and breath sounds normal.   Abdominal: Soft. Bowel sounds are normal.   Musculoskeletal: Normal range of motion. She exhibits edema (1+ edema bilaterally).   Neurological: She is alert and oriented to person, place, and time.   Skin: Skin is warm. Lesion and rash noted. Rash is maculopapular. There is erythema.        Psychiatric: She has a normal mood and affect. Her behavior is normal.   Vitals reviewed.       Results Review:   Lab Results (last 24 hours)     Procedure Component Value Units Date/Time    Comprehensive Metabolic Panel [[id number]]  (Abnormal) Collected:  09/01/18 1922    Specimen:  Blood Updated:  09/01/18 1946     Glucose 106 (H) mg/dL      BUN 33 (H) mg/dL      Creatinine 1.32 (H) mg/dL      Sodium 141 mmol/L      Potassium 4.1 mmol/L      Chloride 103 mmol/L      CO2 29.0 mmol/L      Calcium 9.1 mg/dL      Total Protein 6.8 g/dL      Albumin 3.50 g/dL      ALT (SGPT) 25 U/L      AST (SGOT) 25 U/L      Alkaline Phosphatase 76 U/L      Total Bilirubin 0.3 mg/dL      eGFR Non African Amer 39 mL/min/1.73      Globulin 3.3 gm/dL      A/G Ratio 1.1 g/dL      BUN/Creatinine Ratio 25.0     Anion Gap 9.0 mmol/L     Narrative:       The MDRD GFR formula is only valid for adults with stable renal function between ages 18 and 70.    CBC & Differential [[id number]] Collected:  09/01/18 1922    Specimen:  Blood Updated:  09/01/18 1934    Narrative:       The following orders were created for panel order CBC &  Differential.  Procedure                               Abnormality         Status                     ---------                               -----------         ------                     CBC Auto Differential[[id number]]        Abnormal            Final result                 Please view results for these tests on the individual orders.    CBC Auto Differential [[id number]]  (Abnormal) Collected:  09/01/18 1922    Specimen:  Blood Updated:  09/01/18 1934     WBC 9.16 10*3/mm3      RBC 3.69 (L) 10*6/mm3      Hemoglobin 8.9 (L) g/dL      Hematocrit 29.9 (L) %      MCV 81.0 fL      MCH 24.1 (L) pg      MCHC 29.8 (L) g/dL      RDW 18.3 (H) %      RDW-SD 54.5 (H) fl      MPV 9.2 fL      Platelets 299 10*3/mm3      Neutrophil % 57.5 %      Lymphocyte % 21.9 %      Monocyte % 12.4 (H) %      Eosinophil % 7.8 (H) %      Basophil % 0.1 %      Immature Grans % 0.3 %      Neutrophils, Absolute 5.26 10*3/mm3      Lymphocytes, Absolute 2.01 10*3/mm3      Monocytes, Absolute 1.14 (H) 10*3/mm3      Eosinophils, Absolute 0.71 (H) 10*3/mm3      Basophils, Absolute 0.01 10*3/mm3      Immature Grans, Absolute 0.03 (H) 10*3/mm3               Imaging Results (last 24 hours)     Procedure Component Value Units Date/Time    US Venous Doppler Lower Extremity Bilateral (duplex) [[id number]] Collected:  09/01/18 1835     Updated:  09/01/18 1910    Narrative:       .  EXAMINATION:  Ultrasound, venous, lower extremity    CLINICAL INDICATION / HISTORY:   calf tenderness    COMPARISON:  none  TECHNIQUE:  Bilateral lower extremity(ies)                          serial axial graded compression technique                          grayscale, color flow, spectral and  Doppler     FINDINGS:    Imaged vessels:      - common femoral vein (CFV)      - deep femoral vein (FV) (formally called superficial femoral  vein (SFV))      - profunda femoral vein (PFV) (cephalad portion)      - popliteal vein (PV)    - posterior tibial vein (PTV)       - greater  saphenous vein (GSV) (non-contiguous segments)        Unless otherwise indicated, all of the above described vessels  were freely compressible and demonstrated spontaneous and phasic  flow as well as appropriate flow with augmentation.       .      Impression:       CONCLUSION:    1. Negative examination - no evidence of deep venous thrombosis  within the femoral-popliteal system of the bilateral lower  extremity(ies).                  Electronically signed by:  ISAI Lawrence MD  9/1/2018 7:09 PM  CDT Workstation: [id number]           I reviewed the patient's new clinical results.  I reviewed the patient's new imaging results and agree with the interpretation.     ASSESSMENT/PLAN:   Assessment/Plan   Active Hospital Problems    Diagnosis Date Noted   • Cellulitis of lower extremity [L03.119] 09/01/2018   • AKBAR (acute kidney injury) (CMS/[facility]) [N17.9] 09/01/2018   • Essential hypertension [I10] 03/20/2017   • Hyperlipidemia [E78.5] 03/20/2017   • Morbid obesity due to excess calories (CMS/[facility]) [E66.01] 03/20/2017     1.  Cellulitis of both lower extremities: Wound culture, start on IV antibiotic, wound care consult, patient will need to follow-up with wound care center as outpatient.  2.  Acute kidney injury: Patient has a creatinine elevation from 0.8-1.35.  We'll give gentle hydration given the patient has a history of CHF.  Continue monitor her perfusion status.  3.  Essential hypertension: Continue home medication.  4.  Hyperlipidemia: Statin  5.  Anemia: Appears to be chronic, will obtain anemia workup.    DVT prophylaxis will be Lovenox    I discussed the patient's findings and my recommendations with patient, family and nursing staff.              This document has been electronically signed by Peterson Douglass MD on September 1, 2018 9:21 PM

## 2018-09-02 NOTE — PLAN OF CARE
Problem: Patient Care Overview  Goal: Plan of Care Review  Outcome: Ongoing (interventions implemented as appropriate)   09/02/18 0242   Coping/Psychosocial   Plan of Care Reviewed With patient   Plan of Care Review   Progress no change   OTHER   Outcome Summary new pt this shift, wound culture obtained and sent to lab. sleeping between care

## 2018-09-02 NOTE — PLAN OF CARE
Problem: Skin and Soft Tissue Infection (Adult)  Goal: Signs and Symptoms of Listed Potential Problems Will be Absent, Minimized or Managed (Skin and Soft Tissue Infection)  Outcome: Ongoing (interventions implemented as appropriate)      Problem: Fall Risk (Adult)  Goal: Identify Related Risk Factors and Signs and Symptoms  Outcome: Ongoing (interventions implemented as appropriate)    Goal: Absence of Fall  Outcome: Ongoing (interventions implemented as appropriate)      Problem: Skin Injury Risk (Adult)  Goal: Identify Related Risk Factors and Signs and Symptoms  Outcome: Ongoing (interventions implemented as appropriate)    Goal: Skin Health and Integrity  Outcome: Ongoing (interventions implemented as appropriate)      Problem: Patient Care Overview  Goal: Plan of Care Review  Outcome: Ongoing (interventions implemented as appropriate)   09/02/18 3949   Coping/Psychosocial   Plan of Care Reviewed With patient;spouse;family   Plan of Care Review   Progress no change   OTHER   Outcome Summary resting well between care, no pain meds given this shift, diet changed to puree with thin liquids, no straw, incontinent at times. Pt unable to communicate at times due to Parkinsons. Heels elevated on pillow     Goal: Individualization and Mutuality  Outcome: Ongoing (interventions implemented as appropriate)

## 2018-09-03 PROCEDURE — 25010000002 ENOXAPARIN PER 10 MG: Performed by: FAMILY MEDICINE

## 2018-09-03 PROCEDURE — 25010000002 VANCOMYCIN PER 500 MG: Performed by: NURSE PRACTITIONER

## 2018-09-03 RX ORDER — HYDRALAZINE HYDROCHLORIDE 25 MG/1
25 TABLET, FILM COATED ORAL 3 TIMES DAILY PRN
Status: DISCONTINUED | OUTPATIENT
Start: 2018-09-03 | End: 2018-09-07 | Stop reason: HOSPADM

## 2018-09-03 RX ORDER — HYDRALAZINE HYDROCHLORIDE 20 MG/ML
10 INJECTION INTRAMUSCULAR; INTRAVENOUS EVERY 6 HOURS PRN
Status: DISCONTINUED | OUTPATIENT
Start: 2018-09-03 | End: 2018-09-03 | Stop reason: RX

## 2018-09-03 RX ADMIN — METOPROLOL TARTRATE 25 MG: 25 TABLET ORAL at 21:52

## 2018-09-03 RX ADMIN — HYDROCHLOROTHIAZIDE 25 MG: 25 TABLET ORAL at 06:28

## 2018-09-03 RX ADMIN — HYDROCODONE BITARTRATE AND ACETAMINOPHEN 1 TABLET: 10; 325 TABLET ORAL at 10:27

## 2018-09-03 RX ADMIN — POTASSIUM CHLORIDE 10 MEQ: 750 CAPSULE, EXTENDED RELEASE ORAL at 08:28

## 2018-09-03 RX ADMIN — ASPIRIN 81 MG 81 MG: 81 TABLET ORAL at 06:27

## 2018-09-03 RX ADMIN — VANCOMYCIN HYDROCHLORIDE 2500 MG: 1 INJECTION, POWDER, LYOPHILIZED, FOR SOLUTION INTRAVENOUS at 11:59

## 2018-09-03 RX ADMIN — PANTOPRAZOLE SODIUM 40 MG: 40 TABLET, DELAYED RELEASE ORAL at 06:28

## 2018-09-03 RX ADMIN — PRAMIPEXOLE DIHYDROCHLORIDE 0.25 MG: 0.25 TABLET ORAL at 21:52

## 2018-09-03 RX ADMIN — LATANOPROST 1 DROP: 50 SOLUTION OPHTHALMIC at 21:55

## 2018-09-03 RX ADMIN — LISINOPRIL 40 MG: 40 TABLET ORAL at 06:28

## 2018-09-03 RX ADMIN — HYDROCODONE BITARTRATE AND ACETAMINOPHEN 1 TABLET: 10; 325 TABLET ORAL at 21:52

## 2018-09-03 RX ADMIN — ISOSORBIDE MONONITRATE 30 MG: 30 TABLET, EXTENDED RELEASE ORAL at 06:26

## 2018-09-03 RX ADMIN — ENOXAPARIN SODIUM 40 MG: 40 INJECTION SUBCUTANEOUS at 21:52

## 2018-09-03 RX ADMIN — HYDROXYZINE HYDROCHLORIDE 10 MG: 10 SYRUP ORAL at 11:59

## 2018-09-03 RX ADMIN — HYDROXYZINE HYDROCHLORIDE 10 MG: 10 SYRUP ORAL at 01:48

## 2018-09-03 RX ADMIN — ATORVASTATIN CALCIUM 10 MG: 10 TABLET, FILM COATED ORAL at 08:28

## 2018-09-03 RX ADMIN — TRAZODONE HYDROCHLORIDE 200 MG: 100 TABLET ORAL at 21:52

## 2018-09-03 RX ADMIN — SUCRALFATE 1 G: 1 TABLET ORAL at 16:47

## 2018-09-03 RX ADMIN — METOPROLOL TARTRATE 25 MG: 25 TABLET ORAL at 08:28

## 2018-09-03 RX ADMIN — DULOXETINE HYDROCHLORIDE 60 MG: 60 CAPSULE, DELAYED RELEASE ORAL at 06:27

## 2018-09-03 RX ADMIN — GABAPENTIN 200 MG: 100 CAPSULE ORAL at 21:52

## 2018-09-03 RX ADMIN — SUCRALFATE 1 G: 1 TABLET ORAL at 08:28

## 2018-09-03 RX ADMIN — DILTIAZEM HYDROCHLORIDE 120 MG: 120 CAPSULE, COATED, EXTENDED RELEASE ORAL at 08:28

## 2018-09-03 RX ADMIN — BUMETANIDE 1 MG: 1 TABLET ORAL at 06:27

## 2018-09-03 NOTE — PLAN OF CARE
Problem: Skin and Soft Tissue Infection (Adult)  Goal: Signs and Symptoms of Listed Potential Problems Will be Absent, Minimized or Managed (Skin and Soft Tissue Infection)  Outcome: Ongoing (interventions implemented as appropriate)      Problem: Fall Risk (Adult)  Goal: Absence of Fall  Outcome: Ongoing (interventions implemented as appropriate)      Problem: Skin Injury Risk (Adult)  Goal: Skin Health and Integrity  Outcome: Ongoing (interventions implemented as appropriate)      Problem: Patient Care Overview  Goal: Plan of Care Review  Outcome: Ongoing (interventions implemented as appropriate)   09/03/18 0343   Coping/Psychosocial   Plan of Care Reviewed With patient   Plan of Care Review   Progress no change   OTHER   Outcome Summary VSS, pt scratched back of leg and caused it to bleed applied drsg, no other changes

## 2018-09-03 NOTE — PLAN OF CARE
Problem: Skin and Soft Tissue Infection (Adult)  Goal: Signs and Symptoms of Listed Potential Problems Will be Absent, Minimized or Managed (Skin and Soft Tissue Infection)  Outcome: Ongoing (interventions implemented as appropriate)      Problem: Fall Risk (Adult)  Goal: Identify Related Risk Factors and Signs and Symptoms  Outcome: Ongoing (interventions implemented as appropriate)    Goal: Absence of Fall  Outcome: Ongoing (interventions implemented as appropriate)      Problem: Skin Injury Risk (Adult)  Goal: Identify Related Risk Factors and Signs and Symptoms  Outcome: Ongoing (interventions implemented as appropriate)    Goal: Skin Health and Integrity  Outcome: Ongoing (interventions implemented as appropriate)

## 2018-09-03 NOTE — PROGRESS NOTES
"Pharmacokinetics by Pharmacy - Vancomycin Initial Consult    Stephania Judd is a 77 y.o. female being initiated on vancomycin for skin and soft tissue infection. Patient is receiving no other antibiotics.      Objective:     [Ht: 162.6 cm (64\"); Wt: 122 kg (268 lb 6 oz)]     Lab Results   Component Value Date    WBC 8.39 09/02/2018    WBC 9.16 09/01/2018    WBC 8.54 08/07/2018    No results found for: CRP, LACTATE   Temp Readings from Last 1 Encounters:   09/03/18 96.9 °F (36.1 °C) (Oral)     Estimated Creatinine Clearance: 56.4 mL/min (A) (by C-G formula based on SCr of 1.11 mg/dL (H)).   Lab Results   Component Value Date    CREATININE 1.11 (H) 09/02/2018    CREATININE 1.32 (H) 09/01/2018    CREATININE 0.80 08/07/2018       Baseline culture results:  Microbiology Results (last 10 days)       Procedure Component Value - Date/Time    Wound Culture - Wound, Leg [[id number]]  (Abnormal) Collected:  09/01/18 2230    Lab Status:  Preliminary result Specimen:  Wound from Leg Updated:  09/03/18 0725     Wound Culture Light growth (2+) Staphylococcus aureus, MRSA (A)     Comment: Multi Drug Resistant Organism    contact precautions requested    Methicillin resistant Staphylococcus aureus, Patient may be an isolation risk.        Gram Stain Result No WBCs or organisms seen               Assessment  WBC 8.39  SCr 1.11 - decreasing  BMI 46    Wound culture growing MRSA    20 mg/kg x1 ordered for loading dose    Utilizing traditional dosing for SSTI  Goal vancomycin AUC/trough: 10-15    Plan  1. Give vancomycin 2500mg IV x 1 followed by vancomycin 1750mg IV Q24  2. Trough ordered for 9/6 at 1130 - prior to the fourth dose  3. Pharmacy will monitor renal function and adjust dose accordingly.    Dinora Chauhan RPH  09/03/18 11:01 AM     "

## 2018-09-03 NOTE — PROGRESS NOTES
[facility] Medicine Services  INPATIENT PROGRESS NOTE    Length of Stay: 1  Date of Admission: 9/1/2018  Primary Care Physician: Provider, No Known    Subjective   Chief Complaint: No complaints    HPI:      9/3/2018:  The patient had a positive wound culture for MRSA.  Started Vancomycin.        9/2/2018:  Patient has no complaints today.  Patient's been started on Levaquin.  When cultures pending.  Lab shows a B12 deficiency.  Patient states that she has had chronic problems with gastric ulcers.  B12 shots have been started.    H&P by Dr. Douglass:  Patient is 77 y.o. female presents with past medical history of essential hypertension, hyperlipidemia, morbid obesity, presented to the ED with a complaint of bilateral lower extremity swelling and erythema and weeping.  Patient has been treated by M.D. to you was started on doxycycline to days ago however her symptoms did not improve.  Patient also had Unna boot placement which was tight enough that was causing her circulation to cut off.  Unna boot was taken off at home and patient came to the ER because of weeping crusted wounds.  Patient denies any other symptom at this point.    Review of Systems   Constitutional: Positive for fatigue.   Cardiovascular: Positive for leg swelling.   Skin: Positive for wound.        Bilateral lower extremities have multiple blood-crusted lesions.       All pertinent negatives and positives are as above. All other systems have been reviewed and are negative unless otherwise stated.     Objective    Temp:  [96.9 °F (36.1 °C)-98.5 °F (36.9 °C)] 96.9 °F (36.1 °C)  Heart Rate:  [77-89] 89  Resp:  [18] 18  BP: (118-168)/(58-88) 168/88    Physical Exam   Constitutional: She is oriented to person, place, and time. She appears well-developed and well-nourished.   HENT:   Head: Normocephalic and atraumatic.   Eyes: Pupils are equal, round, and reactive to light. EOM are normal.   Neck: Normal range of  motion. Neck supple.   Cardiovascular: Normal rate and regular rhythm.    Pulmonary/Chest: Effort normal and breath sounds normal.   Abdominal: Soft. Bowel sounds are normal.   Musculoskeletal: Normal range of motion.   Neurological: She is alert and oriented to person, place, and time.   Skin: Skin is warm and dry. There is erythema.        Multiple blood-crusted lesions with erythema and edema.    Psychiatric: She has a normal mood and affect.       Results Review:  I have reviewed the labs, radiology results, and diagnostic studies.    Laboratory Data:     Results from last 7 days  Lab Units 09/02/18  0526 09/01/18 1922   SODIUM mmol/L 138 141   POTASSIUM mmol/L 4.1 4.1   CHLORIDE mmol/L 105 103   CO2 mmol/L 28.0 29.0   BUN mg/dL 33* 33*   CREATININE mg/dL 1.11* 1.32*   GLUCOSE mg/dL 97 106*   CALCIUM mg/dL 8.5 9.1   BILIRUBIN mg/dL 0.2 0.3   ALK PHOS U/L 72 76   ALT (SGPT) U/L 22 25   AST (SGOT) U/L 31 25   ANION GAP mmol/L 5.0 9.0     Estimated Creatinine Clearance: 56.4 mL/min (A) (by C-G formula based on SCr of 1.11 mg/dL (H)).            Results from last 7 days  Lab Units 09/02/18  0526 09/01/18 1922   WBC 10*3/mm3 8.39 9.16   HEMOGLOBIN g/dL 8.0* 8.9*   HEMATOCRIT % 27.3* 29.9*   PLATELETS 10*3/mm3 290 299           Culture Data:   No results found for: BLOODCX  No results found for: URINECX  No results found for: RESPCX  Wound Culture   Date Value Ref Range Status   09/01/2018 Light growth (2+) Staphylococcus aureus, MRSA (A)  Preliminary     Comment:     Multi Drug Resistant Organism    contact precautions requested    Methicillin resistant Staphylococcus aureus, Patient may be an isolation risk.     No results found for: STOOLCX  No components found for: BODYFLD    Radiology Data:   Imaging Results (last 24 hours)     ** No results found for the last 24 hours. **          I have reviewed the patient's current medications.     Assessment/Plan     Active Hospital Problems    Diagnosis Date Noted   •  Cellulitis of lower extremity [L03.119] 09/01/2018   • AKBAR (acute kidney injury) (CMS/[facility]) [N17.9] 09/01/2018   • Essential hypertension [I10] 03/20/2017   • Hyperlipidemia [E78.5] 03/20/2017   • Morbid obesity due to excess calories (CMS/[facility]) [E66.01] 03/20/2017       Plan:    1.  Cellulitis of both lower extremities: Wound culture positive for MRSA.  Levaquin discontinued and Vancomycin started. .  2.  Acute kidney injury, improved:  We'll  continue to monitor.  Pharmacy to dose Vanc.   3.  Essential hypertension: Continue home medication.  4.  Hyperlipidemia: Statin  5.  Anemia: Appears to be chronic.  B12 <159.  B12 IM started.         Discharge Planning: I expect patient to be discharged to home in 1-2 days.      This document has been electronically signed by MARLENI Painter on September 3, 2018 1:27 PM

## 2018-09-04 LAB
ALBUMIN SERPL-MCNC: 3.5 G/DL (ref 3.4–4.8)
ALBUMIN/GLOB SERPL: 1.1 G/DL (ref 1.1–1.8)
ALP SERPL-CCNC: 94 U/L (ref 38–126)
ALT SERPL W P-5'-P-CCNC: 26 U/L (ref 9–52)
ANION GAP SERPL CALCULATED.3IONS-SCNC: 11 MMOL/L (ref 5–15)
AST SERPL-CCNC: 17 U/L (ref 14–36)
BACTERIA SPEC AEROBE CULT: ABNORMAL
BASOPHILS # BLD AUTO: 0.02 10*3/MM3 (ref 0–0.2)
BASOPHILS NFR BLD AUTO: 0.2 % (ref 0–2)
BILIRUB SERPL-MCNC: 0.4 MG/DL (ref 0.2–1.3)
BUN BLD-MCNC: 33 MG/DL (ref 7–21)
BUN/CREAT SERPL: 24.8 (ref 7–25)
CALCIUM SPEC-SCNC: 8.6 MG/DL (ref 8.4–10.2)
CHLORIDE SERPL-SCNC: 101 MMOL/L (ref 95–110)
CO2 SERPL-SCNC: 27 MMOL/L (ref 22–31)
CREAT BLD-MCNC: 1.33 MG/DL (ref 0.5–1)
DEPRECATED RDW RBC AUTO: 55.8 FL (ref 36.4–46.3)
EOSINOPHIL # BLD AUTO: 1 10*3/MM3 (ref 0–0.7)
EOSINOPHIL NFR BLD AUTO: 10.8 % (ref 0–7)
ERYTHROCYTE [DISTWIDTH] IN BLOOD BY AUTOMATED COUNT: 18.7 % (ref 11.5–14.5)
GFR SERPL CREATININE-BSD FRML MDRD: 39 ML/MIN/1.73 (ref 39–90)
GLOBULIN UR ELPH-MCNC: 3.3 GM/DL (ref 2.3–3.5)
GLUCOSE BLD-MCNC: 111 MG/DL (ref 60–100)
GRAM STN SPEC: ABNORMAL
HCT VFR BLD AUTO: 30.8 % (ref 35–45)
HGB BLD-MCNC: 9.2 G/DL (ref 12–15.5)
IMM GRANULOCYTES # BLD: 0.02 10*3/MM3 (ref 0–0.02)
IMM GRANULOCYTES NFR BLD: 0.2 % (ref 0–0.5)
LYMPHOCYTES # BLD AUTO: 2.36 10*3/MM3 (ref 0.6–4.2)
LYMPHOCYTES NFR BLD AUTO: 25.4 % (ref 10–50)
MCH RBC QN AUTO: 24.3 PG (ref 26.5–34)
MCHC RBC AUTO-ENTMCNC: 29.9 G/DL (ref 31.4–36)
MCV RBC AUTO: 81.3 FL (ref 80–98)
MONOCYTES # BLD AUTO: 0.72 10*3/MM3 (ref 0–0.9)
MONOCYTES NFR BLD AUTO: 7.8 % (ref 0–12)
NEUTROPHILS # BLD AUTO: 5.16 10*3/MM3 (ref 2–8.6)
NEUTROPHILS NFR BLD AUTO: 55.6 % (ref 37–80)
PLATELET # BLD AUTO: 321 10*3/MM3 (ref 150–450)
PMV BLD AUTO: 9.3 FL (ref 8–12)
POTASSIUM BLD-SCNC: 3.5 MMOL/L (ref 3.5–5.1)
PROT SERPL-MCNC: 6.8 G/DL (ref 6.3–8.6)
RBC # BLD AUTO: 3.79 10*6/MM3 (ref 3.77–5.16)
SODIUM BLD-SCNC: 139 MMOL/L (ref 137–145)
WBC NRBC COR # BLD: 9.28 10*3/MM3 (ref 3.2–9.8)

## 2018-09-04 PROCEDURE — 25010000002 ENOXAPARIN PER 10 MG: Performed by: FAMILY MEDICINE

## 2018-09-04 PROCEDURE — 85025 COMPLETE CBC W/AUTO DIFF WBC: CPT | Performed by: NURSE PRACTITIONER

## 2018-09-04 PROCEDURE — 25010000002 VANCOMYCIN PER 500 MG: Performed by: NURSE PRACTITIONER

## 2018-09-04 PROCEDURE — 80053 COMPREHEN METABOLIC PANEL: CPT | Performed by: NURSE PRACTITIONER

## 2018-09-04 RX ORDER — DOCUSATE SODIUM 100 MG/1
100 CAPSULE, LIQUID FILLED ORAL 2 TIMES DAILY
Status: DISCONTINUED | OUTPATIENT
Start: 2018-09-04 | End: 2018-09-07 | Stop reason: HOSPADM

## 2018-09-04 RX ORDER — SODIUM CHLORIDE 9 MG/ML
INJECTION, SOLUTION INTRAVENOUS
Status: DISPENSED
Start: 2018-09-04 | End: 2018-09-04

## 2018-09-04 RX ORDER — CLINDAMYCIN HYDROCHLORIDE 150 MG/1
600 CAPSULE ORAL 2 TIMES DAILY
Status: DISCONTINUED | OUTPATIENT
Start: 2018-09-04 | End: 2018-09-06

## 2018-09-04 RX ADMIN — VANCOMYCIN HYDROCHLORIDE 1750 MG: 5 INJECTION, POWDER, LYOPHILIZED, FOR SOLUTION INTRAVENOUS at 11:43

## 2018-09-04 RX ADMIN — LATANOPROST 1 DROP: 50 SOLUTION OPHTHALMIC at 22:33

## 2018-09-04 RX ADMIN — DOCUSATE SODIUM 100 MG: 100 CAPSULE, LIQUID FILLED ORAL at 12:29

## 2018-09-04 RX ADMIN — PANTOPRAZOLE SODIUM 40 MG: 40 TABLET, DELAYED RELEASE ORAL at 06:34

## 2018-09-04 RX ADMIN — DOCUSATE SODIUM 100 MG: 100 CAPSULE, LIQUID FILLED ORAL at 22:32

## 2018-09-04 RX ADMIN — HYDROCODONE BITARTRATE AND ACETAMINOPHEN 1 TABLET: 10; 325 TABLET ORAL at 10:16

## 2018-09-04 RX ADMIN — HYDROCODONE BITARTRATE AND ACETAMINOPHEN 1 TABLET: 10; 325 TABLET ORAL at 22:32

## 2018-09-04 RX ADMIN — HYDROCHLOROTHIAZIDE 25 MG: 25 TABLET ORAL at 06:34

## 2018-09-04 RX ADMIN — POTASSIUM CHLORIDE 10 MEQ: 750 CAPSULE, EXTENDED RELEASE ORAL at 10:17

## 2018-09-04 RX ADMIN — POLYETHYLENE GLYCOL 3350 17 G: 17 POWDER, FOR SOLUTION ORAL at 12:29

## 2018-09-04 RX ADMIN — ASPIRIN 81 MG 81 MG: 81 TABLET ORAL at 10:17

## 2018-09-04 RX ADMIN — METOPROLOL TARTRATE 25 MG: 25 TABLET ORAL at 10:17

## 2018-09-04 RX ADMIN — PRAMIPEXOLE DIHYDROCHLORIDE 0.25 MG: 0.25 TABLET ORAL at 22:32

## 2018-09-04 RX ADMIN — ATORVASTATIN CALCIUM 10 MG: 10 TABLET, FILM COATED ORAL at 10:17

## 2018-09-04 RX ADMIN — TRAZODONE HYDROCHLORIDE 200 MG: 100 TABLET ORAL at 22:33

## 2018-09-04 RX ADMIN — LISINOPRIL 40 MG: 40 TABLET ORAL at 06:34

## 2018-09-04 RX ADMIN — DILTIAZEM HYDROCHLORIDE 120 MG: 120 CAPSULE, COATED, EXTENDED RELEASE ORAL at 10:16

## 2018-09-04 RX ADMIN — DULOXETINE HYDROCHLORIDE 60 MG: 60 CAPSULE, DELAYED RELEASE ORAL at 06:34

## 2018-09-04 RX ADMIN — SUCRALFATE 1 G: 1 TABLET ORAL at 10:16

## 2018-09-04 RX ADMIN — GABAPENTIN 200 MG: 100 CAPSULE ORAL at 22:32

## 2018-09-04 RX ADMIN — ASPIRIN 81 MG 81 MG: 81 TABLET ORAL at 06:34

## 2018-09-04 RX ADMIN — SUCRALFATE 1 G: 1 TABLET ORAL at 17:04

## 2018-09-04 RX ADMIN — ISOSORBIDE MONONITRATE 30 MG: 30 TABLET, EXTENDED RELEASE ORAL at 06:34

## 2018-09-04 RX ADMIN — SODIUM CHLORIDE 250 ML: 9 INJECTION, SOLUTION INTRAVENOUS at 10:26

## 2018-09-04 RX ADMIN — BUMETANIDE 1 MG: 1 TABLET ORAL at 06:34

## 2018-09-04 RX ADMIN — CLINDAMYCIN HYDROCHLORIDE 600 MG: 150 CAPSULE ORAL at 22:32

## 2018-09-04 RX ADMIN — ENOXAPARIN SODIUM 40 MG: 40 INJECTION SUBCUTANEOUS at 22:32

## 2018-09-04 RX ADMIN — METOPROLOL TARTRATE 25 MG: 25 TABLET ORAL at 22:33

## 2018-09-04 NOTE — CONSULTS
"Adult Nutrition  Assessment    Patient Name:  Stephania Judd  YOB: 1940  MRN: [id number]  Admit Date:  9/1/2018    Assessment Date:  9/4/2018    Comments:  Pt presents w/BMI 46 which is compatible w/morbid obesity. Pt very receptive to healthy diet/wt loss education and asked several ?s re: low na diet. RD reviewed recommendations/tips and provided sample menus to help pt have ideas when grocery shopping. Encouraged pt to call w/?s.           Reason for Assessment     Row Name 09/04/18 0946          Reason for Assessment    Reason For Assessment per organizational policy     Identified At Risk by Screening Criteria BMI;need for education               Nutrition/Diet History     Row Name 09/04/18 0946          Nutrition/Diet History    Typical Food/Fluid Intake Pt says she tries to watch her sugar and sodium intake, but says she does not have diabetes. She understands weight loss would be beneficial \"its just hard to change your ways\". Receptive to info RD provided and asked several ?s.                          Electronically signed by:  Adrianna Cole RD  09/04/18 9:49 AM  "

## 2018-09-04 NOTE — PAYOR COMM NOTE
"Stephania Judd (77 y.o. 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)   Code Status:  CPR    Ht:  162.6 cm (64\")   Wt:  122 kg (268 lb 6 oz)    Admission Cmt:  None   Principal Problem:  None                Active Insurance as of 9/1/2018     Primary Coverage     Payor Plan Insurance Group Employer/Plan Group    MISC MEDICARE REPLACMENT [facility] [id number]     Coverage Address Coverage Phone Number Effective From Effective To    [id number] [phone] 1/1/2017     [place] [id number]       Subscriber Name Subscriber Birth Date Member ID       STEPHANIA JUDD [id number]                 Emergency Contacts      (Rel.) Home Phone Work Phone Mobile Phone    Mehrdad Chaves (Son) [phone] -- [phone]        Lanie Lo- [facility]  P:[phone]  F:[phone]         History & Physical      Peterson Douglass MD at 9/1/2018  8:58 PM           [address], [place]. [address]  T - [phone]     H&P         SUBJECTIVE:   Patient Care Team:  Provider, No Known as PCP - General  Dorian Baird APRN as PCP - Claims Attributed    Chief Complaint:     Chief Complaint   Patient presents with   • Leg Swelling   • leg redness   • leg wounds       Patient is 77 y.o. female presents with past medical history of essential hypertension, hyperlipidemia, morbid " obesity, presented to the ED with a complaint of bilateral lower extremity swelling and erythema and weeping.  Patient has been treated by MARIBEL yates you was started on doxycycline to days ago however her symptoms did not improve.  Patient also had Unna boot placement which was tight enough that was causing her circulation to cut off.  Unna boot was taken off at home and patient came to the ER because of weeping crusted wounds.  Patient denies any other symptom at this point.    HPI     ROS/HISTORY/ CURRENT MEDICATIONS/OBJECTIVE/VS/PE:   Review of Systems:   Review of Systems   Constitutional: Positive for activity change, appetite change and fatigue. Negative for chills and fever.   HENT: Negative for congestion and rhinorrhea.    Respiratory: Negative for chest tightness, shortness of breath and wheezing.    Cardiovascular: Positive for leg swelling. Negative for chest pain and palpitations.   Gastrointestinal: Negative for abdominal pain, blood in stool, constipation, diarrhea, nausea and vomiting.   Genitourinary: Negative for dysuria, frequency and urgency.   Musculoskeletal: Positive for back pain and gait problem.   Skin: Positive for color change, rash and wound.   Neurological: Negative for dizziness and light-headedness.   Psychiatric/Behavioral: Negative for agitation, behavioral problems and confusion.       History:     Past Medical History:   Diagnosis Date   • Arthritis    • Bronchitis    • Carotid artery stenosis     5 years ago, pt was medically managed w/ baby aspirin.   • Carpal tunnel syndrome    • Chest pain    • CHF (congestive heart failure) (CMS/HCC)    • GERD (gastroesophageal reflux disease)    • Glaucoma    • Hyperlipidemia    • Hypertension    • Pulmonary hypertension    • Sleep apnea      Past Surgical History:   Procedure Laterality Date   • BACK SURGERY     • BREAST LUMPECTOMY      right side   • CATARACT EXTRACTION     • COLONOSCOPY     • COLONOSCOPY N/A 2/5/2018    Procedure:  COLONOSCOPY;  Surgeon: Josesito Garcia MD;  Location: [facility] ENDOSCOPY;  Service:    • ENDOSCOPY N/A 2/5/2018    Procedure: ESOPHAGOGASTRODUODENOSCOPY;  Surgeon: Josesito Garcia MD;  Location: [facility] ENDOSCOPY;  Service:    • HYSTERECTOMY      partial   • TONSILLECTOMY       Family History   Problem Relation Age of Onset   • Heart disease Mother    • Hypertension Mother    • Hypertension Sister    • Diabetes Sister    • Heart disease Brother    • Hypertension Brother    • Diabetes Brother    • Heart disease Brother    • Hypertension Brother    • Diabetes Brother      Social History   Substance Use Topics   • Smoking status: Former Smoker   • Smokeless tobacco: Never Used      Comment: quit 50 + years ago    • Alcohol use No       (Not in a hospital admission)  Allergies:  Morphine and related    Current Medications:     Current Facility-Administered Medications   Medication Dose Route Frequency Provider Last Rate Last Dose   • sodium chloride 0.9 % flush 10 mL  10 mL Intravenous PRN Ghanshyam Conrad APRN   10 mL at 09/01/18 1923     Current Outpatient Prescriptions   Medication Sig Dispense Refill   • albuterol (VENTOLIN HFA) 108 (90 BASE) MCG/ACT inhaler 2 puffs every 4 hours as needed for breathing 1 inhaler 5   • aspirin 81 MG chewable tablet Chew 81 mg Every Morning.     • bumetanide (BUMEX) 1 MG tablet Take 1 tablet by mouth Every Morning.     • Cholecalciferol 1000 units tablet Take 1,000 Units by mouth Every Morning.     • diltiaZEM (TAZTIA XT) 360 MG 24 hr capsule Take 360 mg by mouth Every Night.     • doxycycline (VIBRAMYCIN) 100 MG capsule Take 100 mg by mouth 2 (Two) Times a Day.     • DULoxetine (CYMBALTA) 60 MG capsule Take 60 mg by mouth Every Morning.     • gabapentin (NEURONTIN) 100 MG capsule Take 200 mg by mouth Every Night.     • hydrochlorothiazide (HYDRODIURIL) 25 MG tablet Take 25 mg by mouth Every Morning.     • HYDROcodone-acetaminophen (NORCO)  MG per tablet Take 1 tablet by mouth  Every 12 (Twelve) Hours As Needed for Moderate Pain .     • hydrOXYzine (ATARAX) 10 MG tablet Take 10 mg by mouth 3 (Three) Times a Day As Needed for Itching.     • isosorbide mononitrate (IMDUR) 30 MG 24 hr tablet Take 30 mg by mouth Every Morning.     • latanoprost (XALATAN) 0.005 % ophthalmic solution Administer 1 drop to both eyes Every Night.     • lisinopril (PRINIVIL,ZESTRIL) 20 MG tablet Take 40 mg by mouth Every Morning.     • meclizine (ANTIVERT) 12.5 MG tablet Take 12.5 mg by mouth Every 12 (Twelve) Hours As Needed for dizziness.     • metoprolol tartrate (LOPRESSOR) 25 MG tablet Take 25 mg by mouth 2 (Two) Times a Day.     • pantoprazole (PROTONIX) 40 MG EC tablet Take 1 tablet by mouth Daily. (Patient taking differently: Take 40 mg by mouth Every Morning.) 90 tablet 3   • potassium chloride (K-DUR) 10 MEQ CR tablet Take 10 mEq by mouth Every Night.     • pramipexole (MIRAPEX) 0.25 MG tablet Take 0.25 mg by mouth Every Night.     • pravastatin (PRAVACHOL) 20 MG tablet Take 20 mg by mouth Every Night.     • sucralfate (CARAFATE) 1 g tablet Take 1 g by mouth 2 (Two) Times a Day.     • tiZANidine (ZANAFLEX) 4 MG tablet Take 2 mg by mouth 2 (Two) Times a Day As Needed for Muscle Spasms.     • traZODone (DESYREL) 100 MG tablet Take 200 mg by mouth Every Night.         Physical Exam:     Vital Sign Min/Max for last 24 hours  Temp  Min: 98.3 °F (36.8 °C)  Max: 98.3 °F (36.8 °C)   BP  Min: 144/63  Max: 164/72   Pulse  Min: 73  Max: 84   Resp  Min: 18  Max: 20   SpO2  Min: 95 %  Max: 95 %   No Data Recorded   Weight  Min: 122 kg (268 lb 6 oz)  Max: 122 kg (268 lb 6 oz)       Physical Exam:    Physical Exam   Constitutional: She is oriented to person, place, and time. She appears well-developed and well-nourished.   HENT:   Head: Normocephalic and atraumatic.   Eyes: Pupils are equal, round, and reactive to light. EOM are normal.   Neck: Normal range of motion.   Cardiovascular: Normal rate, regular rhythm and  normal heart sounds.    Pulmonary/Chest: Effort normal and breath sounds normal.   Abdominal: Soft. Bowel sounds are normal.   Musculoskeletal: Normal range of motion. She exhibits edema (1+ edema bilaterally).   Neurological: She is alert and oriented to person, place, and time.   Skin: Skin is warm. Lesion and rash noted. Rash is maculopapular. There is erythema.        Psychiatric: She has a normal mood and affect. Her behavior is normal.   Vitals reviewed.       Results Review:   Lab Results (last 24 hours)     Procedure Component Value Units Date/Time    Comprehensive Metabolic Panel [[id number]]  (Abnormal) Collected:  09/01/18 1922    Specimen:  Blood Updated:  09/01/18 1946     Glucose 106 (H) mg/dL      BUN 33 (H) mg/dL      Creatinine 1.32 (H) mg/dL      Sodium 141 mmol/L      Potassium 4.1 mmol/L      Chloride 103 mmol/L      CO2 29.0 mmol/L      Calcium 9.1 mg/dL      Total Protein 6.8 g/dL      Albumin 3.50 g/dL      ALT (SGPT) 25 U/L      AST (SGOT) 25 U/L      Alkaline Phosphatase 76 U/L      Total Bilirubin 0.3 mg/dL      eGFR Non African Amer 39 mL/min/1.73      Globulin 3.3 gm/dL      A/G Ratio 1.1 g/dL      BUN/Creatinine Ratio 25.0     Anion Gap 9.0 mmol/L     Narrative:       The MDRD GFR formula is only valid for adults with stable renal function between ages 18 and 70.    CBC & Differential [[id number]] Collected:  09/01/18 1922    Specimen:  Blood Updated:  09/01/18 1934    Narrative:       The following orders were created for panel order CBC & Differential.  Procedure                               Abnormality         Status                     ---------                               -----------         ------                     CBC Auto Differential[[id number]]        Abnormal            Final result                 Please view results for these tests on the individual orders.    CBC Auto Differential [[id number]]  (Abnormal) Collected:  09/01/18 1922    Specimen:  Blood Updated:  09/01/18  1934     WBC 9.16 10*3/mm3      RBC 3.69 (L) 10*6/mm3      Hemoglobin 8.9 (L) g/dL      Hematocrit 29.9 (L) %      MCV 81.0 fL      MCH 24.1 (L) pg      MCHC 29.8 (L) g/dL      RDW 18.3 (H) %      RDW-SD 54.5 (H) fl      MPV 9.2 fL      Platelets 299 10*3/mm3      Neutrophil % 57.5 %      Lymphocyte % 21.9 %      Monocyte % 12.4 (H) %      Eosinophil % 7.8 (H) %      Basophil % 0.1 %      Immature Grans % 0.3 %      Neutrophils, Absolute 5.26 10*3/mm3      Lymphocytes, Absolute 2.01 10*3/mm3      Monocytes, Absolute 1.14 (H) 10*3/mm3      Eosinophils, Absolute 0.71 (H) 10*3/mm3      Basophils, Absolute 0.01 10*3/mm3      Immature Grans, Absolute 0.03 (H) 10*3/mm3               Imaging Results (last 24 hours)     Procedure Component Value Units Date/Time    US Venous Doppler Lower Extremity Bilateral (duplex) [[id number]] Collected:  09/01/18 1835     Updated:  09/01/18 1910    Narrative:       .  EXAMINATION:  Ultrasound, venous, lower extremity    CLINICAL INDICATION / HISTORY:   calf tenderness    COMPARISON:  none  TECHNIQUE:  Bilateral lower extremity(ies)                          serial axial graded compression technique                          grayscale, color flow, spectral and  Doppler     FINDINGS:    Imaged vessels:      - common femoral vein (CFV)      - deep femoral vein (FV) (formally called superficial femoral  vein (SFV))      - profunda femoral vein (PFV) (cephalad portion)      - popliteal vein (PV)    - posterior tibial vein (PTV)       - greater saphenous vein (GSV) (non-contiguous segments)        Unless otherwise indicated, all of the above described vessels  were freely compressible and demonstrated spontaneous and phasic  flow as well as appropriate flow with augmentation.       .      Impression:       CONCLUSION:    1. Negative examination - no evidence of deep venous thrombosis  within the femoral-popliteal system of the bilateral lower  extremity(ies).                  Electronically  signed by:  ISAI Lawrence MD  9/1/2018 7:09 PM  CDT Workstation: [id number]           I reviewed the patient's new clinical results.  I reviewed the patient's new imaging results and agree with the interpretation.     ASSESSMENT/PLAN:   Assessment/Plan   Active Hospital Problems    Diagnosis Date Noted   • Cellulitis of lower extremity [L03.119] 09/01/2018   • AKBAR (acute kidney injury) (CMS/[facility]) [N17.9] 09/01/2018   • Essential hypertension [I10] 03/20/2017   • Hyperlipidemia [E78.5] 03/20/2017   • Morbid obesity due to excess calories (CMS/[facility]) [E66.01] 03/20/2017     1.  Cellulitis of both lower extremities: Wound culture, start on IV antibiotic, wound care consult, patient will need to follow-up with wound care center as outpatient.  2.  Acute kidney injury: Patient has a creatinine elevation from 0.8-1.35.  We'll give gentle hydration given the patient has a history of CHF.  Continue monitor her perfusion status.  3.  Essential hypertension: Continue home medication.  4.  Hyperlipidemia: Statin  5.  Anemia: Appears to be chronic, will obtain anemia workup.    DVT prophylaxis will be Lovenox    I discussed the patient's findings and my recommendations with patient, family and nursing staff.              This document has been electronically signed by Peterson Douglass MD on September 1, 2018 9:21 PM                             Electronically signed by Peterson Douglass MD at 9/1/2018  9:25 PM          Emergency Department Notes      Ghanshyam Conrad, MARLENI at 9/1/2018  6:00 PM     Attestation signed by Sandro Marin MD at 9/2/2018  8:59 PM          For this patient encounter, I reviewed the NP or PA documentation, treatment plan, and medical decision making. Sandro Marin MD 9/2/2018 8:59 PM                  Subjective   77-year-old female reports emergency department today complaining of bilateral leg swelling and some skin breakdown.  She is seen by home health and has been treated with antibiotics for some  cellulitis.  The patient was recently changed from Unna boots to some other type of dressing.  The granddaughter bedside reports that she came over today and the Ace wrap was cutting into her leg.  The patient has some cellulitis noted on both lower extremities, some pedal edema noted worse on the left lower history the right.  Pulses intact.          History provided by:  Patient   used: No        Review of Systems   Constitutional: Negative for activity change, chills, fatigue and fever.   HENT: Negative for congestion, ear pain, hearing loss, mouth sores, nosebleeds, postnasal drip, sinus pain, sinus pressure and voice change.    Respiratory: Negative for apnea, cough and wheezing.    Cardiovascular: Negative for chest pain and palpitations.   Gastrointestinal: Negative for abdominal distention, abdominal pain, constipation, nausea and vomiting.   Endocrine: Negative for cold intolerance.   Musculoskeletal: Positive for arthralgias, gait problem and joint swelling.   Skin: Positive for wound. Negative for rash.   Allergic/Immunologic: Negative for immunocompromised state.   Neurological: Negative for dizziness, weakness and numbness.   Hematological: Negative for adenopathy.   Psychiatric/Behavioral: Negative for confusion.   All other systems reviewed and are negative.      Past Medical History:   Diagnosis Date   • Arthritis    • Bronchitis    • Carotid artery stenosis     5 years ago, pt was medically managed w/ baby aspirin.   • Carpal tunnel syndrome    • Chest pain    • CHF (congestive heart failure) (CMS/HCC)    • GERD (gastroesophageal reflux disease)    • Glaucoma    • Hyperlipidemia    • Hypertension    • Pulmonary hypertension    • Sleep apnea        Allergies   Allergen Reactions   • Morphine And Related Nausea And Vomiting       Past Surgical History:   Procedure Laterality Date   • BACK SURGERY     • BREAST LUMPECTOMY      right side   • CATARACT EXTRACTION     • COLONOSCOPY      • COLONOSCOPY N/A 2/5/2018    Procedure: COLONOSCOPY;  Surgeon: Josesito Garcia MD;  Location: [facility] ENDOSCOPY;  Service:    • ENDOSCOPY N/A 2/5/2018    Procedure: ESOPHAGOGASTRODUODENOSCOPY;  Surgeon: Josesito Garcia MD;  Location: [facility] ENDOSCOPY;  Service:    • HYSTERECTOMY      partial   • TONSILLECTOMY         Family History   Problem Relation Age of Onset   • Heart disease Mother    • Hypertension Mother    • Hypertension Sister    • Diabetes Sister    • Heart disease Brother    • Hypertension Brother    • Diabetes Brother    • Heart disease Brother    • Hypertension Brother    • Diabetes Brother        Social History     Social History   • Marital status:      Social History Main Topics   • Smoking status: Former Smoker   • Smokeless tobacco: Never Used      Comment: quit 50 + years ago    • Alcohol use No   • Drug use: No   • Sexual activity: Defer     Other Topics Concern   • Not on file           Objective   Physical Exam   Constitutional: She is oriented to person, place, and time. Vital signs are normal. She appears well-developed and well-nourished.   HENT:   Head: Normocephalic.   Nose: Nose normal.   Eyes: Pupils are equal, round, and reactive to light. Conjunctivae are normal.   Neck: Normal range of motion.   Cardiovascular: Normal rate, regular rhythm and normal heart sounds.    Pulmonary/Chest: Effort normal and breath sounds normal.   Abdominal: Soft.   Musculoskeletal:        Right lower leg: She exhibits tenderness, swelling and edema.        Left lower leg: She exhibits tenderness, swelling and edema.   Neurological: She is alert and oriented to person, place, and time. GCS eye subscore is 4. GCS verbal subscore is 5. GCS motor subscore is 6.   Skin: Skin is warm and dry.   Psychiatric: She has a normal mood and affect.   Nursing note and vitals reviewed.      Procedures          ED Course    ..  US Venous Doppler Lower Extremity Bilateral (duplex)   Final Result   CONCLUSION:      1. Negative examination - no evidence of deep venous thrombosis   within the femoral-popliteal system of the bilateral lower   extremity(ies).                      Electronically signed by:  ISAI Lawrence MD  9/1/2018 7:09 PM   CDT Workstation: [id number]      \  ..  Results for orders placed or performed during the hospital encounter of 09/01/18   Comprehensive Metabolic Panel   Result Value Ref Range    Glucose 106 (H) 60 - 100 mg/dL    BUN 33 (H) 7 - 21 mg/dL    Creatinine 1.32 (H) 0.50 - 1.00 mg/dL    Sodium 141 137 - 145 mmol/L    Potassium 4.1 3.5 - 5.1 mmol/L    Chloride 103 95 - 110 mmol/L    CO2 29.0 22.0 - 31.0 mmol/L    Calcium 9.1 8.4 - 10.2 mg/dL    Total Protein 6.8 6.3 - 8.6 g/dL    Albumin 3.50 3.40 - 4.80 g/dL    ALT (SGPT) 25 9 - 52 U/L    AST (SGOT) 25 14 - 36 U/L    Alkaline Phosphatase 76 38 - 126 U/L    Total Bilirubin 0.3 0.2 - 1.3 mg/dL    eGFR Non African Amer 39 39 - 90 mL/min/1.73    Globulin 3.3 2.3 - 3.5 gm/dL    A/G Ratio 1.1 1.1 - 1.8 g/dL    BUN/Creatinine Ratio 25.0 7.0 - 25.0    Anion Gap 9.0 5.0 - 15.0 mmol/L   CBC Auto Differential   Result Value Ref Range    WBC 9.16 3.20 - 9.80 10*3/mm3    RBC 3.69 (L) 3.77 - 5.16 10*6/mm3    Hemoglobin 8.9 (L) 12.0 - 15.5 g/dL    Hematocrit 29.9 (L) 35.0 - 45.0 %    MCV 81.0 80.0 - 98.0 fL    MCH 24.1 (L) 26.5 - 34.0 pg    MCHC 29.8 (L) 31.4 - 36.0 g/dL    RDW 18.3 (H) 11.5 - 14.5 %    RDW-SD 54.5 (H) 36.4 - 46.3 fl    MPV 9.2 8.0 - 12.0 fL    Platelets 299 150 - 450 10*3/mm3    Neutrophil % 57.5 37.0 - 80.0 %    Lymphocyte % 21.9 10.0 - 50.0 %    Monocyte % 12.4 (H) 0.0 - 12.0 %    Eosinophil % 7.8 (H) 0.0 - 7.0 %    Basophil % 0.1 0.0 - 2.0 %    Immature Grans % 0.3 0.0 - 0.5 %    Neutrophils, Absolute 5.26 2.00 - 8.60 10*3/mm3    Lymphocytes, Absolute 2.01 0.60 - 4.20 10*3/mm3    Monocytes, Absolute 1.14 (H) 0.00 - 0.90 10*3/mm3    Eosinophils, Absolute 0.71 (H) 0.00 - 0.70 10*3/mm3    Basophils, Absolute 0.01 0.00 - 0.20 10*3/mm3     Immature Grans, Absolute 0.03 (H) 0.00 - 0.02 10*3/mm3               MDM      Final diagnoses:   Cellulitis of lower extremity, unspecified laterality   AKBAR (acute kidney injury) (CMS/[facility])            Ghanshyam Conrad, APRN  09/01/18 2043      Electronically signed by Sandro Marin MD at 9/2/2018  8:59 PM       Hospital Medications (all)       Dose Frequency Start End    albuterol (PROVENTIL) nebulizer solution 0.083% 2.5 mg/3mL 2.5 mg Every 4 Hours PRN 9/1/2018     Sig - Route: Take 2.5 mg by nebulization Every 4 (Four) Hours As Needed for Wheezing. - Nebulization    aspirin chewable tablet 81 mg 81 mg Every Morning 9/2/2018     Sig - Route: Chew 1 tablet Every Morning. - Oral    atorvastatin (LIPITOR) tablet 10 mg 10 mg Daily 9/2/2018     Sig - Route: Take 1 tablet by mouth Daily. - Oral    cyanocobalamin injection 1,000 mcg 1,000 mcg Every 28 Days 9/2/2018     Sig - Route: Inject 1 mL into the appropriate muscle as directed by prescriber Every 28 (Twenty-Eight) Days. - Intramuscular    diltiaZEM CD (CARDIZEM CD) 24 hr capsule 120 mg 120 mg Every 24 Hours Scheduled 9/2/2018     Sig - Route: Take 1 capsule by mouth Daily. - Oral    diphenhydrAMINE-zinc acetate 2-0.1 % cream  3 Times Daily PRN 9/2/2018     Sig - Route: Apply  topically to the appropriate area as directed 3 (Three) Times a Day As Needed for Itching. - Topical    DULoxetine (CYMBALTA) DR capsule 60 mg 60 mg Every Morning 9/2/2018     Sig - Route: Take 1 capsule by mouth Every Morning. - Oral    enoxaparin (LOVENOX) syringe 40 mg 40 mg Every 24 Hours 9/1/2018     Sig - Route: Inject 0.4 mL under the skin into the appropriate area as directed Daily. - Subcutaneous    gabapentin (NEURONTIN) capsule 200 mg 200 mg Nightly 9/1/2018     Sig - Route: Take 2 capsules by mouth Every Night. - Oral    hydrALAZINE (APRESOLINE) tablet 25 mg 25 mg 3 Times Daily PRN 9/3/2018     Sig - Route: Take 1 tablet by mouth 3 (Three) Times a Day As Needed (SBP >160). - Oral     "HYDROcodone-acetaminophen (NORCO)  MG per tablet 1 tablet 1 tablet Every 12 Hours PRN 9/1/2018     Sig - Route: Take 1 tablet by mouth Every 12 (Twelve) Hours As Needed for Moderate Pain . - Oral    hydrOXYzine (ATARAX) 10 MG/5ML syrup 10 mg 10 mg 3 Times Daily PRN 9/1/2018     Sig - Route: Take 5 mL by mouth 3 (Three) Times a Day As Needed for Itching. - Oral    isosorbide mononitrate (IMDUR) 24 hr tablet 30 mg 30 mg Every Morning 9/2/2018     Sig - Route: Take 1 tablet by mouth Every Morning. - Oral    latanoprost (XALATAN) 0.005 % ophthalmic solution 1 drop 1 drop Nightly 9/1/2018     Sig - Route: Administer 1 drop to both eyes Every Night. - Both Eyes    meclizine (ANTIVERT) tablet 12.5 mg 12.5 mg Every 12 Hours PRN 9/1/2018     Sig - Route: Take 1 tablet by mouth Every 12 (Twelve) Hours As Needed for dizziness. - Oral    metoprolol tartrate (LOPRESSOR) tablet 25 mg 25 mg Every 12 Hours Scheduled 9/1/2018     Sig - Route: Take 1 tablet by mouth Every 12 (Twelve) Hours. - Oral    morphine injection 1 mg 1 mg Every 4 Hours PRN 9/1/2018 9/11/2018    Sig - Route: Infuse 0.5 mL into a venous catheter Every 4 (Four) Hours As Needed for Moderate Pain . - Intravenous    Linked Group 1:  \"And\" Linked Group Details        naloxone (NARCAN) injection 0.4 mg 0.4 mg Every 5 Minutes PRN 9/1/2018     Sig - Route: Infuse 1 mL into a venous catheter Every 5 (Five) Minutes As Needed for Respiratory Depression. - Intravenous    Linked Group 1:  \"And\" Linked Group Details        pantoprazole (PROTONIX) EC tablet 40 mg 40 mg Every Early Morning 9/2/2018     Sig - Route: Take 1 tablet by mouth Every Morning. - Oral    Pharmacy to dose vancomycin  Continuous PRN 9/3/2018 9/10/2018    Sig - Route: Continuous As Needed for Consult. - Does not apply    piperacillin-tazobactam (ZOSYN) 3.375 g/100 mL 0.9% NS IVPB (mbp) 3.375 g Once 9/1/2018 9/1/2018    Sig - Route: Infuse 100 mL into a venous catheter 1 (One) Time. - Intravenous    " "Cosign for Ordering: Required by Sandro Marin MD    potassium chloride (MICRO-K) CR capsule 10 mEq 10 mEq Daily 9/2/2018     Sig - Route: Take 1 capsule by mouth Daily. - Oral    pramipexole (MIRAPEX) tablet 0.25 mg 0.25 mg Nightly 9/1/2018     Sig - Route: Take 1 tablet by mouth Every Night. - Oral    sodium chloride 0.9 % bolus 250 mL 250 mL Once 9/4/2018     Sig - Route: Infuse 250 mL into a venous catheter 1 (One) Time. - Intravenous    sodium chloride 0.9 % flush 1-10 mL 1-10 mL As Needed 9/1/2018     Sig - Route: Infuse 1-10 mL into a venous catheter As Needed for Line Care. - Intravenous    sodium chloride 0.9 % flush 10 mL 10 mL As Needed 9/1/2018     Sig - Route: Infuse 10 mL into a venous catheter As Needed for Line Care. - Intravenous    Cosign for Ordering: Required by Sandro Marin MD    Linked Group 2:  \"And\" Linked Group Details        sodium chloride 0.9 % infusion  - ADS Override Pull   9/4/2018 9/4/2018    Notes to Pharmacy: MARICRUZ: cabinet override    sodium chloride 0.9 % infusion 50 mL/hr Continuous 9/1/2018 9/2/2018    Sig - Route: Infuse 50 mL/hr into a venous catheter Continuous. - Intravenous    sucralfate (CARAFATE) tablet 1 g 1 g 2 Times Daily Before Meals 9/2/2018     Sig - Route: Take 1 tablet by mouth 2 (Two) Times a Day Before Meals. - Oral    tiZANidine (ZANAFLEX) tablet 2 mg 2 mg 2 Times Daily PRN 9/1/2018     Sig - Route: Take 1 tablet by mouth 2 (Two) Times a Day As Needed for Muscle Spasms. - Oral    traZODone (DESYREL) tablet 200 mg 200 mg Nightly 9/1/2018     Sig - Route: Take 2 tablets by mouth Every Night. - Oral    vancomycin 1750 mg/500 mL 0.9% NS IVPB ([facility]) 15 mg/kg × 122 kg Every 24 Hours 9/4/2018 9/11/2018    Sig - Route: Infuse 500 mL into a venous catheter Daily. - Intravenous    vancomycin 2500 mg/500 mL 0.9% NS IVPB ([facility]) 20 mg/kg × 122 kg Once 9/3/2018 9/3/2018    Sig - Route: Infuse 500 mL into a venous catheter 1 (One) Time. - Intravenous    bumetanide " (BUMEX) tablet 1 mg (Discontinued) 1 mg Every Morning 9/2/2018 9/4/2018    Sig - Route: Take 1 tablet by mouth Every Morning. - Oral    hydrALAZINE (APRESOLINE) injection 10 mg (Discontinued) 10 mg Every 6 Hours PRN 9/3/2018 9/3/2018    Sig - Route: Infuse 0.5 mL into a venous catheter Every 6 (Six) Hours As Needed for High Blood Pressure. - Intravenous    Reason for Discontinue: Availability    hydrochlorothiazide (HYDRODIURIL) tablet 25 mg (Discontinued) 25 mg Every Morning 9/2/2018 9/4/2018    Sig - Route: Take 1 tablet by mouth Every Morning. - Oral    levoFLOXacin (LEVAQUIN) 500 mg/100 mL D5W (premix) (LEVAQUIN) 500 mg (Discontinued) 500 mg Every 24 Hours 9/1/2018 9/3/2018    Sig - Route: Infuse 100 mL into a venous catheter Daily. - Intravenous    lisinopril (PRINIVIL,ZESTRIL) tablet 40 mg (Discontinued) 40 mg Every Morning 9/2/2018 9/4/2018    Sig - Route: Take 1 tablet by mouth Every Morning. - Oral          Lab Results (last 7 days)     Procedure Component Value Units Date/Time    Comprehensive Metabolic Panel [[id number]]  (Abnormal) Collected:  09/04/18 0540    Specimen:  Blood Updated:  09/04/18 0634     Glucose 111 (H) mg/dL      BUN 33 (H) mg/dL      Creatinine 1.33 (H) mg/dL      Sodium 139 mmol/L      Potassium 3.5 mmol/L      Chloride 101 mmol/L      CO2 27.0 mmol/L      Calcium 8.6 mg/dL      Total Protein 6.8 g/dL      Albumin 3.50 g/dL      ALT (SGPT) 26 U/L      AST (SGOT) 17 U/L      Alkaline Phosphatase 94 U/L      Total Bilirubin 0.4 mg/dL      eGFR Non African Amer 39 mL/min/1.73      Globulin 3.3 gm/dL      A/G Ratio 1.1 g/dL      BUN/Creatinine Ratio 24.8     Anion Gap 11.0 mmol/L     Narrative:       The MDRD GFR formula is only valid for adults with stable renal function between ages 18 and 70.    CBC & Differential [[id number]] Collected:  09/04/18 0540    Specimen:  Blood Updated:  09/04/18 0623    Narrative:       The following orders were created for panel order CBC &  Differential.  Procedure                               Abnormality         Status                     ---------                               -----------         ------                     CBC Auto Differential[[id number]]        Abnormal            Final result                 Please view results for these tests on the individual orders.    CBC Auto Differential [[id number]]  (Abnormal) Collected:  09/04/18 0540    Specimen:  Blood Updated:  09/04/18 0623     WBC 9.28 10*3/mm3      RBC 3.79 10*6/mm3      Hemoglobin 9.2 (L) g/dL      Hematocrit 30.8 (L) %      MCV 81.3 fL      MCH 24.3 (L) pg      MCHC 29.9 (L) g/dL      RDW 18.7 (H) %      RDW-SD 55.8 (H) fl      MPV 9.3 fL      Platelets 321 10*3/mm3      Neutrophil % 55.6 %      Lymphocyte % 25.4 %      Monocyte % 7.8 %      Eosinophil % 10.8 (H) %      Basophil % 0.2 %      Immature Grans % 0.2 %      Neutrophils, Absolute 5.16 10*3/mm3      Lymphocytes, Absolute 2.36 10*3/mm3      Monocytes, Absolute 0.72 10*3/mm3      Eosinophils, Absolute 1.00 (H) 10*3/mm3      Basophils, Absolute 0.02 10*3/mm3      Immature Grans, Absolute 0.02 10*3/mm3     Wound Culture - Wound, Leg [[id number]]  (Abnormal)  (Susceptibility) Collected:  09/01/18 2230    Specimen:  Wound from Leg Updated:  09/04/18 0556     Wound Culture Light growth (2+) Staphylococcus aureus, MRSA (A)     Comment: Multi Drug Resistant Organism    contact precautions requested    Methicillin resistant Staphylococcus aureus, Patient may be an isolation risk.        Gram Stain Result No WBCs or organisms seen    Narrative:       Slide reviewed confirmed    Susceptibility      Staphylococcus aureus, MRSA     ANALISA     Clindamycin <=0.5 ug/ml Susceptible     Gentamicin <=4 ug/ml Susceptible     Oxacillin >2 ug/ml Resistant     Tetracycline <=4 ug/ml Susceptible     Trimethoprim + Sulfamethoxazole <=0.5/9.5 ug/ml Susceptible     Vancomycin 1 ug/ml Susceptible                    CBC Auto Differential [[id number]]   (Abnormal) Collected:  09/02/18 0526    Specimen:  Blood Updated:  09/02/18 0713     WBC 8.39 10*3/mm3      RBC 3.36 (L) 10*6/mm3      Hemoglobin 8.0 (L) g/dL      Hematocrit 27.3 (L) %      MCV 81.3 fL      MCH 23.8 (L) pg      MCHC 29.3 (L) g/dL      RDW 18.6 (H) %      RDW-SD 54.8 (H) fl      MPV 9.8 fL      Platelets 290 10*3/mm3      Neutrophil % 53.6 %      Lymphocyte % 23.7 %      Monocyte % 12.3 (H) %      Eosinophil % 10.0 (H) %      Basophil % 0.2 %      Immature Grans % 0.2 %      Neutrophils, Absolute 4.49 10*3/mm3      Lymphocytes, Absolute 1.99 10*3/mm3      Monocytes, Absolute 1.03 (H) 10*3/mm3      Eosinophils, Absolute 0.84 (H) 10*3/mm3      Basophils, Absolute 0.02 10*3/mm3      Immature Grans, Absolute 0.02 10*3/mm3      nRBC 0.0 /100 WBC     Vitamin B12 [[id number]]  (Abnormal) Collected:  09/02/18 0526    Specimen:  Blood Updated:  09/02/18 0709     Vitamin B-12 <159 (L) pg/mL     Folate [[id number]]  (Normal) Collected:  09/02/18 0526    Specimen:  Blood Updated:  09/02/18 0709     Folate 9.04 ng/mL     Comprehensive Metabolic Panel [[id number]]  (Abnormal) Collected:  09/02/18 0526    Specimen:  Blood Updated:  09/02/18 0625     Glucose 97 mg/dL      BUN 33 (H) mg/dL      Creatinine 1.11 (H) mg/dL      Sodium 138 mmol/L      Potassium 4.1 mmol/L      Chloride 105 mmol/L      CO2 28.0 mmol/L      Calcium 8.5 mg/dL      Total Protein 6.0 (L) g/dL      Albumin 3.00 (L) g/dL      ALT (SGPT) 22 U/L      AST (SGOT) 31 U/L      Alkaline Phosphatase 72 U/L      Total Bilirubin 0.2 mg/dL      eGFR Non African Amer 48 mL/min/1.73      Globulin 3.0 gm/dL      A/G Ratio 1.0 (L) g/dL      BUN/Creatinine Ratio 29.7 (H)     Anion Gap 5.0 mmol/L     Narrative:       The MDRD GFR formula is only valid for adults with stable renal function between ages 18 and 70.    Ferritin [[id number]]  (Normal) Collected:  09/01/18 1922    Specimen:  Blood Updated:  09/02/18 0056     Ferritin 18.10 ng/mL     Iron Profile  [[id number]] Collected:  09/01/18 1922    Specimen:  Blood Updated:  09/01/18 2157    Comprehensive Metabolic Panel [[id number]]  (Abnormal) Collected:  09/01/18 1922    Specimen:  Blood Updated:  09/01/18 1946     Glucose 106 (H) mg/dL      BUN 33 (H) mg/dL      Creatinine 1.32 (H) mg/dL      Sodium 141 mmol/L      Potassium 4.1 mmol/L      Chloride 103 mmol/L      CO2 29.0 mmol/L      Calcium 9.1 mg/dL      Total Protein 6.8 g/dL      Albumin 3.50 g/dL      ALT (SGPT) 25 U/L      AST (SGOT) 25 U/L      Alkaline Phosphatase 76 U/L      Total Bilirubin 0.3 mg/dL      eGFR Non African Amer 39 mL/min/1.73      Globulin 3.3 gm/dL      A/G Ratio 1.1 g/dL      BUN/Creatinine Ratio 25.0     Anion Gap 9.0 mmol/L     Narrative:       The MDRD GFR formula is only valid for adults with stable renal function between ages 18 and 70.    CBC & Differential [[id number]] Collected:  09/01/18 1922    Specimen:  Blood Updated:  09/01/18 1934    Narrative:       The following orders were created for panel order CBC & Differential.  Procedure                               Abnormality         Status                     ---------                               -----------         ------                     CBC Auto Differential[[id number]]        Abnormal            Final result                 Please view results for these tests on the individual orders.    CBC Auto Differential [[id number]]  (Abnormal) Collected:  09/01/18 1922    Specimen:  Blood Updated:  09/01/18 1934     WBC 9.16 10*3/mm3      RBC 3.69 (L) 10*6/mm3      Hemoglobin 8.9 (L) g/dL      Hematocrit 29.9 (L) %      MCV 81.0 fL      MCH 24.1 (L) pg      MCHC 29.8 (L) g/dL      RDW 18.3 (H) %      RDW-SD 54.5 (H) fl      MPV 9.2 fL      Platelets 299 10*3/mm3      Neutrophil % 57.5 %      Lymphocyte % 21.9 %      Monocyte % 12.4 (H) %      Eosinophil % 7.8 (H) %      Basophil % 0.1 %      Immature Grans % 0.3 %      Neutrophils, Absolute 5.26 10*3/mm3      Lymphocytes, Absolute  2.01 10*3/mm3      Monocytes, Absolute 1.14 (H) 10*3/mm3      Eosinophils, Absolute 0.71 (H) 10*3/mm3      Basophils, Absolute 0.01 10*3/mm3      Immature Grans, Absolute 0.03 (H) 10*3/mm3           Imaging Results (last 7 days)     Procedure Component Value Units Date/Time    US Venous Doppler Lower Extremity Bilateral (duplex) [[id number]] Collected:  09/01/18 1835     Updated:  09/01/18 1910    Narrative:       .  EXAMINATION:  Ultrasound, venous, lower extremity    CLINICAL INDICATION / HISTORY:   calf tenderness    COMPARISON:  none  TECHNIQUE:  Bilateral lower extremity(ies)                          serial axial graded compression technique                          grayscale, color flow, spectral and  Doppler     FINDINGS:    Imaged vessels:      - common femoral vein (CFV)      - deep femoral vein (FV) (formally called superficial femoral  vein (SFV))      - profunda femoral vein (PFV) (cephalad portion)      - popliteal vein (PV)    - posterior tibial vein (PTV)       - greater saphenous vein (GSV) (non-contiguous segments)        Unless otherwise indicated, all of the above described vessels  were freely compressible and demonstrated spontaneous and phasic  flow as well as appropriate flow with augmentation.       .      Impression:       CONCLUSION:    1. Negative examination - no evidence of deep venous thrombosis  within the femoral-popliteal system of the bilateral lower  extremity(ies).                  Electronically signed by:  ISAI Lawrence MD  9/1/2018 7:09 PM  CDT Workstation: [id number]        ECG/EMG Results (last 7 days)     ** No results found for the last 168 hours. **           Physician Progress Notes (last 7 days) (Notes from 8/28/2018 11:00 AM through 9/4/2018 11:00 AM)      Valerie Olivares APRN at 9/3/2018  1:27 PM     Attestation signed by Gabe Anderson MD at 9/3/2018  7:24 PM    I personally evaluated and examined the patient in conjunction with MARLENI Olguin and agree with the  assessment, treatment plan, and disposition of the patient as recorded.    Exam:  General: A&O x3  CV: S1 + S2  MSK: B/l LE edema            This document has been electronically signed by Gabe Anderson MD on September 3, 2018 7:24 PM                            [facility] Medicine Services  INPATIENT PROGRESS NOTE    Length of Stay: 1  Date of Admission: 9/1/2018  Primary Care Physician: Provider, No Known    Subjective   Chief Complaint: No complaints    HPI:      9/3/2018:  The patient had a positive wound culture for MRSA.  Started Vancomycin.        9/2/2018:  Patient has no complaints today.  Patient's been started on Levaquin.  When cultures pending.  Lab shows a B12 deficiency.  Patient states that she has had chronic problems with gastric ulcers.  B12 shots have been started.    H&P by Dr. Douglass:  Patient is 77 y.o. female presents with past medical history of essential hypertension, hyperlipidemia, morbid obesity, presented to the ED with a complaint of bilateral lower extremity swelling and erythema and weeping.  Patient has been treated by M.D. to you was started on doxycycline to days ago however her symptoms did not improve.  Patient also had Unna boot placement which was tight enough that was causing her circulation to cut off.  Unna boot was taken off at home and patient came to the ER because of weeping crusted wounds.  Patient denies any other symptom at this point.    Review of Systems   Constitutional: Positive for fatigue.   Cardiovascular: Positive for leg swelling.   Skin: Positive for wound.        Bilateral lower extremities have multiple blood-crusted lesions.       All pertinent negatives and positives are as above. All other systems have been reviewed and are negative unless otherwise stated.     Objective    Temp:  [96.9 °F (36.1 °C)-98.5 °F (36.9 °C)] 96.9 °F (36.1 °C)  Heart Rate:  [77-89] 89  Resp:  [18] 18  BP: (118-168)/(58-88) 168/88    Physical  Exam   Constitutional: She is oriented to person, place, and time. She appears well-developed and well-nourished.   HENT:   Head: Normocephalic and atraumatic.   Eyes: Pupils are equal, round, and reactive to light. EOM are normal.   Neck: Normal range of motion. Neck supple.   Cardiovascular: Normal rate and regular rhythm.    Pulmonary/Chest: Effort normal and breath sounds normal.   Abdominal: Soft. Bowel sounds are normal.   Musculoskeletal: Normal range of motion.   Neurological: She is alert and oriented to person, place, and time.   Skin: Skin is warm and dry. There is erythema.        Multiple blood-crusted lesions with erythema and edema.    Psychiatric: She has a normal mood and affect.       Results Review:  I have reviewed the labs, radiology results, and diagnostic studies.    Laboratory Data:     Results from last 7 days  Lab Units 09/02/18  0526 09/01/18 1922   SODIUM mmol/L 138 141   POTASSIUM mmol/L 4.1 4.1   CHLORIDE mmol/L 105 103   CO2 mmol/L 28.0 29.0   BUN mg/dL 33* 33*   CREATININE mg/dL 1.11* 1.32*   GLUCOSE mg/dL 97 106*   CALCIUM mg/dL 8.5 9.1   BILIRUBIN mg/dL 0.2 0.3   ALK PHOS U/L 72 76   ALT (SGPT) U/L 22 25   AST (SGOT) U/L 31 25   ANION GAP mmol/L 5.0 9.0     Estimated Creatinine Clearance: 56.4 mL/min (A) (by C-G formula based on SCr of 1.11 mg/dL (H)).            Results from last 7 days  Lab Units 09/02/18  0526 09/01/18 1922   WBC 10*3/mm3 8.39 9.16   HEMOGLOBIN g/dL 8.0* 8.9*   HEMATOCRIT % 27.3* 29.9*   PLATELETS 10*3/mm3 290 299           Culture Data:   No results found for: BLOODCX  No results found for: URINECX  No results found for: RESPCX  Wound Culture   Date Value Ref Range Status   09/01/2018 Light growth (2+) Staphylococcus aureus, MRSA (A)  Preliminary     Comment:     Multi Drug Resistant Organism    contact precautions requested    Methicillin resistant Staphylococcus aureus, Patient may be an isolation risk.     No results found for: STOOLCX  No components found  for: BODYFLD    Radiology Data:   Imaging Results (last 24 hours)     ** No results found for the last 24 hours. **          I have reviewed the patient's current medications.     Assessment/Plan     Active Hospital Problems    Diagnosis Date Noted   • Cellulitis of lower extremity [L03.119] 09/01/2018   • AKBAR (acute kidney injury) (CMS/[facility]) [N17.9] 09/01/2018   • Essential hypertension [I10] 03/20/2017   • Hyperlipidemia [E78.5] 03/20/2017   • Morbid obesity due to excess calories (CMS/[facility]) [E66.01] 03/20/2017       Plan:    1.  Cellulitis of both lower extremities: Wound culture positive for MRSA.  Levaquin discontinued and Vancomycin started. .  2.  Acute kidney injury, improved:  We'll  continue to monitor.  Pharmacy to dose Vanc.   3.  Essential hypertension: Continue home medication.  4.  Hyperlipidemia: Statin  5.  Anemia: Appears to be chronic.  B12 <159.  B12 IM started.         Discharge Planning: I expect patient to be discharged to home in 1-2 days.      This document has been electronically signed by MARLENI Painter on September 3, 2018 1:27 PM          Electronically signed by Gabe Anderson MD at 9/3/2018  7:24 PM     Valerie Olivares APRN at 9/2/2018 10:56 AM     Attestation signed by Javier Valdes MD at 9/2/2018  8:23 PM    I have reviewed the documentation above and agree. Her leg pain is improving. She however has itching in both legs. Leg appears less red. Will try benadryl cream.                        [facility] Medicine Services  INPATIENT PROGRESS NOTE    Length of Stay: 0  Date of Admission: 9/1/2018  Primary Care Physician: Provider, No Known    Subjective   Chief Complaint: No complaints    HPI:      9/2/2018:  Patient has no complaints today.  Patient's been started on Levaquin.  When cultures pending.  Lab shows a B12 deficiency.  Patient states that she has had chronic problems with gastric ulcers.  B12 shots have been started.    H&P by  Dr. Douglass:  Patient is 77 y.o. female presents with past medical history of essential hypertension, hyperlipidemia, morbid obesity, presented to the ED with a complaint of bilateral lower extremity swelling and erythema and weeping.  Patient has been treated by M.D. to you was started on doxycycline to days ago however her symptoms did not improve.  Patient also had Unna boot placement which was tight enough that was causing her circulation to cut off.  Unna boot was taken off at home and patient came to the ER because of weeping crusted wounds.  Patient denies any other symptom at this point.    Review of Systems   Constitutional: Positive for fatigue.   Cardiovascular: Positive for leg swelling.   Skin: Positive for wound.        Bilateral lower extremities have multiple blood-crusted lesions.       All pertinent negatives and positives are as above. All other systems have been reviewed and are negative unless otherwise stated.     Objective    Temp:  [98.3 °F (36.8 °C)-98.7 °F (37.1 °C)] 98.7 °F (37.1 °C)  Heart Rate:  [61-84] 61  Resp:  [18-20] 18  BP: (144-164)/(63-73) 150/72    Physical Exam   Constitutional: She is oriented to person, place, and time. She appears well-developed and well-nourished.   HENT:   Head: Normocephalic and atraumatic.   Eyes: Pupils are equal, round, and reactive to light. EOM are normal.   Neck: Normal range of motion. Neck supple.   Cardiovascular: Normal rate and regular rhythm.    Pulmonary/Chest: Effort normal and breath sounds normal.   Abdominal: Soft. Bowel sounds are normal.   Musculoskeletal: Normal range of motion.   Neurological: She is alert and oriented to person, place, and time.   Skin: Skin is warm and dry. There is erythema.        Multiple blood-crusted lesions with erythema and edema.    Psychiatric: She has a normal mood and affect.       Results Review:  I have reviewed the labs, radiology results, and diagnostic studies.    Laboratory Data:     Results from last  7 days  Lab Units 09/02/18  0526 09/01/18  1922   SODIUM mmol/L 138 141   POTASSIUM mmol/L 4.1 4.1   CHLORIDE mmol/L 105 103   CO2 mmol/L 28.0 29.0   BUN mg/dL 33* 33*   CREATININE mg/dL 1.11* 1.32*   GLUCOSE mg/dL 97 106*   CALCIUM mg/dL 8.5 9.1   BILIRUBIN mg/dL 0.2 0.3   ALK PHOS U/L 72 76   ALT (SGPT) U/L 22 25   AST (SGOT) U/L 31 25   ANION GAP mmol/L 5.0 9.0     Estimated Creatinine Clearance: 56.4 mL/min (A) (by C-G formula based on SCr of 1.11 mg/dL (H)).            Results from last 7 days  Lab Units 09/02/18 0526 09/01/18 1922   WBC 10*3/mm3 8.39 9.16   HEMOGLOBIN g/dL 8.0* 8.9*   HEMATOCRIT % 27.3* 29.9*   PLATELETS 10*3/mm3 290 299           Culture Data:   No results found for: BLOODCX  No results found for: URINECX  No results found for: RESPCX  Wound Culture   Date Value Ref Range Status   09/01/2018 Culture in progress  Preliminary     No results found for: STOOLCX  No components found for: BODYFLD    Radiology Data:   Imaging Results (last 24 hours)     Procedure Component Value Units Date/Time    US Venous Doppler Lower Extremity Bilateral (duplex) [[id number]] Collected:  09/01/18 1835     Updated:  09/01/18 1910    Narrative:       .  EXAMINATION:  Ultrasound, venous, lower extremity    CLINICAL INDICATION / HISTORY:   calf tenderness    COMPARISON:  none  TECHNIQUE:  Bilateral lower extremity(ies)                          serial axial graded compression technique                          grayscale, color flow, spectral and  Doppler     FINDINGS:    Imaged vessels:      - common femoral vein (CFV)      - deep femoral vein (FV) (formally called superficial femoral  vein (SFV))      - profunda femoral vein (PFV) (cephalad portion)      - popliteal vein (PV)    - posterior tibial vein (PTV)       - greater saphenous vein (GSV) (non-contiguous segments)        Unless otherwise indicated, all of the above described vessels  were freely compressible and demonstrated spontaneous and phasic  flow as  well as appropriate flow with augmentation.       .      Impression:       CONCLUSION:    1. Negative examination - no evidence of deep venous thrombosis  within the femoral-popliteal system of the bilateral lower  extremity(ies).                  Electronically signed by:  ISAI Lawrence MD  9/1/2018 7:09 PM  CDT Workstation: [id number]          I have reviewed the patient's current medications.     Assessment/Plan     Active Hospital Problems    Diagnosis Date Noted   • Cellulitis of lower extremity [L03.119] 09/01/2018   • AKBAR (acute kidney injury) (CMS/[facility]) [N17.9] 09/01/2018   • Essential hypertension [I10] 03/20/2017   • Hyperlipidemia [E78.5] 03/20/2017   • Morbid obesity due to excess calories (CMS/[facility]) [E66.01] 03/20/2017       Plan:    1.  Cellulitis of both lower extremities: Wound culture, start on IV antibiotic (Levaquin), wound care consult, patient will need to follow-up with wound care center as outpatient.  2.  Acute kidney injury: Patient has a creatinine elevation from 0.8-1.35.  We'll give gentle hydration given the patient has a history of CHF.  Continue monitor her perfusion status.  3.  Essential hypertension: Continue home medication.  4.  Hyperlipidemia: Statin  5.  Anemia: Appears to be chronic.  B12 <159.  B12 IM started.         Discharge Planning: I expect patient to be discharged to home in 1-2 days.      This document has been electronically signed by MARLENI Painter on September 2, 2018 11:03 AM          Electronically signed by Javier Valdes MD at 9/2/2018  8:23 PM       Consult Notes (last 7 days) (Notes from 08/28/18 through 09/04/18)     No notes of this type exist for this encounter.

## 2018-09-04 NOTE — PLAN OF CARE
Problem: Skin and Soft Tissue Infection (Adult)  Goal: Signs and Symptoms of Listed Potential Problems Will be Absent, Minimized or Managed (Skin and Soft Tissue Infection)  Outcome: Ongoing (interventions implemented as appropriate)      Problem: Fall Risk (Adult)  Goal: Identify Related Risk Factors and Signs and Symptoms  Outcome: Ongoing (interventions implemented as appropriate)      Problem: Skin Injury Risk (Adult)  Goal: Skin Health and Integrity  Outcome: Ongoing (interventions implemented as appropriate)      Problem: Patient Care Overview  Goal: Plan of Care Review  Outcome: Ongoing (interventions implemented as appropriate)   09/04/18 3516   Coping/Psychosocial   Plan of Care Reviewed With patient   Plan of Care Review   Progress no change   OTHER   Outcome Summary wound care into see patient betsy boots applied, no falls, continue abt afebrile     Goal: Individualization and Mutuality  Outcome: Ongoing (interventions implemented as appropriate)    Goal: Discharge Needs Assessment  Outcome: Ongoing (interventions implemented as appropriate)    Goal: Interprofessional Rounds/Family Conf  Outcome: Ongoing (interventions implemented as appropriate)

## 2018-09-04 NOTE — PLAN OF CARE
Problem: Skin and Soft Tissue Infection (Adult)  Goal: Signs and Symptoms of Listed Potential Problems Will be Absent, Minimized or Managed (Skin and Soft Tissue Infection)  Outcome: Ongoing (interventions implemented as appropriate)   09/04/18 0420   Goal/Outcome Evaluation   Problems Assessed (Skin and Soft Tissue Infection) all   Problems Present (Skin/Soft Tissue Inf) pain       Problem: Fall Risk (Adult)  Goal: Absence of Fall  Outcome: Ongoing (interventions implemented as appropriate)   09/04/18 0420   Fall Risk (Adult)   Absence of Fall achieves outcome       Problem: Skin Injury Risk (Adult)  Goal: Skin Health and Integrity  Outcome: Ongoing (interventions implemented as appropriate)   09/04/18 0420   Skin Injury Risk (Adult)   Skin Health and Integrity making progress toward outcome       Problem: Patient Care Overview  Goal: Plan of Care Review  Outcome: Ongoing (interventions implemented as appropriate)   09/04/18 0420   Coping/Psychosocial   Plan of Care Reviewed With patient   Plan of Care Review   Progress no change   OTHER   Outcome Summary VSS, pain managed with medication, pt wearing CPAP while sleeping.

## 2018-09-04 NOTE — PROGRESS NOTES
[facility] Medicine Services  INPATIENT PROGRESS NOTE    Length of Stay: 2  Date of Admission: 9/1/2018  Primary Care Physician: Provider, No Known    Subjective   Chief Complaint: No complaints    HPI:      9/4/2018:  Patient has no complaints today.  Spoke with pharmacy we will change antibiotics to clindamycin oral.  PT/OT to recommend discharge plan.     9/3/2018:  The patient had a positive wound culture for MRSA.  Started Vancomycin.        9/2/2018:  Patient has no complaints today.  Patient's been started on Levaquin.  When cultures pending.  Lab shows a B12 deficiency.  Patient states that she has had chronic problems with gastric ulcers.  B12 shots have been started.    H&P by Dr. Douglass:  Patient is 77 y.o. female presents with past medical history of essential hypertension, hyperlipidemia, morbid obesity, presented to the ED with a complaint of bilateral lower extremity swelling and erythema and weeping.  Patient has been treated by M.D. to you was started on doxycycline to days ago however her symptoms did not improve.  Patient also had Unna boot placement which was tight enough that was causing her circulation to cut off.  Unna boot was taken off at home and patient came to the ER because of weeping crusted wounds.  Patient denies any other symptom at this point.    Review of Systems   Constitutional: Positive for fatigue.   Cardiovascular: Positive for leg swelling.   Skin: Positive for wound.        Bilateral lower extremities have multiple blood-crusted lesions.       All pertinent negatives and positives are as above. All other systems have been reviewed and are negative unless otherwise stated.     Objective    Temp:  [96.5 °F (35.8 °C)-98.6 °F (37 °C)] 97.2 °F (36.2 °C)  Heart Rate:  [69-90] 90  Resp:  [18-20] 20  BP: (114-157)/(49-87) 114/49    Physical Exam   Constitutional: She is oriented to person, place, and time. She appears well-developed and  well-nourished.   HENT:   Head: Normocephalic and atraumatic.   Eyes: Pupils are equal, round, and reactive to light. EOM are normal.   Neck: Normal range of motion. Neck supple.   Cardiovascular: Normal rate and regular rhythm.    Pulmonary/Chest: Effort normal and breath sounds normal.   Abdominal: Soft. Bowel sounds are normal.   Musculoskeletal: Normal range of motion.   Neurological: She is alert and oriented to person, place, and time.   Skin: Skin is warm and dry. There is erythema.        Multiple blood-crusted lesions with erythema and edema.    Psychiatric: She has a normal mood and affect.       Results Review:  I have reviewed the labs, radiology results, and diagnostic studies.    Laboratory Data:     Results from last 7 days  Lab Units 09/04/18 0540 09/02/18 0526 09/01/18 1922   SODIUM mmol/L 139 138 141   POTASSIUM mmol/L 3.5 4.1 4.1   CHLORIDE mmol/L 101 105 103   CO2 mmol/L 27.0 28.0 29.0   BUN mg/dL 33* 33* 33*   CREATININE mg/dL 1.33* 1.11* 1.32*   GLUCOSE mg/dL 111* 97 106*   CALCIUM mg/dL 8.6 8.5 9.1   BILIRUBIN mg/dL 0.4 0.2 0.3   ALK PHOS U/L 94 72 76   ALT (SGPT) U/L 26 22 25   AST (SGOT) U/L 17 31 25   ANION GAP mmol/L 11.0 5.0 9.0     Estimated Creatinine Clearance: 47.1 mL/min (A) (by C-G formula based on SCr of 1.33 mg/dL (H)).            Results from last 7 days  Lab Units 09/04/18 0540 09/02/18 0526 09/01/18 1922   WBC 10*3/mm3 9.28 8.39 9.16   HEMOGLOBIN g/dL 9.2* 8.0* 8.9*   HEMATOCRIT % 30.8* 27.3* 29.9*   PLATELETS 10*3/mm3 321 290 299           Culture Data:   No results found for: BLOODCX  No results found for: URINECX  No results found for: RESPCX  Wound Culture   Date Value Ref Range Status   09/01/2018 Light growth (2+) Staphylococcus aureus, MRSA (A)  Final     Comment:     Multi Drug Resistant Organism    contact precautions requested    Methicillin resistant Staphylococcus aureus, Patient may be an isolation risk.     No results found for: STOOLCX  No components found  for: BODYFLD    Radiology Data:   Imaging Results (last 24 hours)     ** No results found for the last 24 hours. **          I have reviewed the patient's current medications.     Assessment/Plan     Active Hospital Problems    Diagnosis Date Noted   • Cellulitis of lower extremity [L03.119] 09/01/2018   • AKBAR (acute kidney injury) (CMS/[facility]) [N17.9] 09/01/2018   • Essential hypertension [I10] 03/20/2017   • Hyperlipidemia [E78.5] 03/20/2017   • Morbid obesity due to excess calories (CMS/[facility]) [E66.01] 03/20/2017       Plan:    1.  Cellulitis of both lower extremities: Wound culture positive for MRSA.  Spoke with Mich from Pharmacy.  Recommends oral Clindamycin.     2.  Acute kidney injury:  Will hold Lisinopril, HCTZ and Bumex.  250 cc bolus over 4 hours.  We'll  continue to monitor.  Vancomycin discontinued.     3.  Essential hypertension: Continue home medication.  4.  Hyperlipidemia: Statin  5.  Anemia: Appears to be chronic.  B12 <159.  B12 IM started.   6.  Deconditioning:  PT/OT.        Discharge Planning: I expect patient to be discharged to home in 1-2 days.      This document has been electronically signed by MARLENI Painter on September 4, 2018 2:46 PM

## 2018-09-04 NOTE — NURSING NOTE
Patient has skin breakdown on both lower legs with open areas, cellulitis and weeping. Bilateral venous duplex was negative for DVTs. Pulses are palpable dorsalis pedis and tibial. Rec unnaboots and coban wrapped from toes to bend of knee. Order given by Valerie YEPEZ. Legs wrapped . Dressings will need changing  09-.

## 2018-09-05 LAB
ALBUMIN SERPL-MCNC: 3.5 G/DL (ref 3.4–4.8)
ALBUMIN/GLOB SERPL: 1.1 G/DL (ref 1.1–1.8)
ALP SERPL-CCNC: 89 U/L (ref 38–126)
ALT SERPL W P-5'-P-CCNC: 23 U/L (ref 9–52)
ANION GAP SERPL CALCULATED.3IONS-SCNC: 11 MMOL/L (ref 5–15)
AST SERPL-CCNC: 18 U/L (ref 14–36)
BASOPHILS # BLD AUTO: 0.02 10*3/MM3 (ref 0–0.2)
BASOPHILS NFR BLD AUTO: 0.2 % (ref 0–2)
BILIRUB SERPL-MCNC: 0.3 MG/DL (ref 0.2–1.3)
BUN BLD-MCNC: 37 MG/DL (ref 7–21)
BUN/CREAT SERPL: 22 (ref 7–25)
CALCIUM SPEC-SCNC: 8.7 MG/DL (ref 8.4–10.2)
CHLORIDE SERPL-SCNC: 102 MMOL/L (ref 95–110)
CO2 SERPL-SCNC: 26 MMOL/L (ref 22–31)
CREAT BLD-MCNC: 1.68 MG/DL (ref 0.5–1)
DEPRECATED RDW RBC AUTO: 59.4 FL (ref 36.4–46.3)
EOSINOPHIL # BLD AUTO: 1.07 10*3/MM3 (ref 0–0.7)
EOSINOPHIL NFR BLD AUTO: 10.7 % (ref 0–7)
ERYTHROCYTE [DISTWIDTH] IN BLOOD BY AUTOMATED COUNT: 19.1 % (ref 11.5–14.5)
GFR SERPL CREATININE-BSD FRML MDRD: 30 ML/MIN/1.73 (ref 39–90)
GLOBULIN UR ELPH-MCNC: 3.3 GM/DL (ref 2.3–3.5)
GLUCOSE BLD-MCNC: 105 MG/DL (ref 60–100)
HCT VFR BLD AUTO: 31.1 % (ref 35–45)
HGB BLD-MCNC: 9.1 G/DL (ref 12–15.5)
IMM GRANULOCYTES # BLD: 0.03 10*3/MM3 (ref 0–0.02)
IMM GRANULOCYTES NFR BLD: 0.3 % (ref 0–0.5)
LYMPHOCYTES # BLD AUTO: 2.77 10*3/MM3 (ref 0.6–4.2)
LYMPHOCYTES NFR BLD AUTO: 27.6 % (ref 10–50)
MCH RBC QN AUTO: 24.5 PG (ref 26.5–34)
MCHC RBC AUTO-ENTMCNC: 29.3 G/DL (ref 31.4–36)
MCV RBC AUTO: 83.8 FL (ref 80–98)
MONOCYTES # BLD AUTO: 0.9 10*3/MM3 (ref 0–0.9)
MONOCYTES NFR BLD AUTO: 9 % (ref 0–12)
NEUTROPHILS # BLD AUTO: 5.25 10*3/MM3 (ref 2–8.6)
NEUTROPHILS NFR BLD AUTO: 52.2 % (ref 37–80)
NRBC BLD MANUAL-RTO: 0 /100 WBC (ref 0–0)
PLATELET # BLD AUTO: 350 10*3/MM3 (ref 150–450)
PMV BLD AUTO: 9 FL (ref 8–12)
POTASSIUM BLD-SCNC: 3.6 MMOL/L (ref 3.5–5.1)
PROT SERPL-MCNC: 6.8 G/DL (ref 6.3–8.6)
RBC # BLD AUTO: 3.71 10*6/MM3 (ref 3.77–5.16)
SODIUM BLD-SCNC: 139 MMOL/L (ref 137–145)
SODIUM UR-SCNC: 75 MMOL/L (ref 30–90)
WBC NRBC COR # BLD: 10.04 10*3/MM3 (ref 3.2–9.8)

## 2018-09-05 PROCEDURE — G8979 MOBILITY GOAL STATUS: HCPCS

## 2018-09-05 PROCEDURE — 84300 ASSAY OF URINE SODIUM: CPT | Performed by: INTERNAL MEDICINE

## 2018-09-05 PROCEDURE — 25010000002 ENOXAPARIN PER 10 MG: Performed by: FAMILY MEDICINE

## 2018-09-05 PROCEDURE — G8978 MOBILITY CURRENT STATUS: HCPCS

## 2018-09-05 PROCEDURE — 97162 PT EVAL MOD COMPLEX 30 MIN: CPT

## 2018-09-05 PROCEDURE — 97116 GAIT TRAINING THERAPY: CPT

## 2018-09-05 PROCEDURE — 87205 SMEAR GRAM STAIN: CPT | Performed by: INTERNAL MEDICINE

## 2018-09-05 PROCEDURE — 97530 THERAPEUTIC ACTIVITIES: CPT

## 2018-09-05 PROCEDURE — 80053 COMPREHEN METABOLIC PANEL: CPT | Performed by: NURSE PRACTITIONER

## 2018-09-05 PROCEDURE — 85025 COMPLETE CBC W/AUTO DIFF WBC: CPT | Performed by: NURSE PRACTITIONER

## 2018-09-05 RX ORDER — SODIUM CHLORIDE 9 MG/ML
50 INJECTION, SOLUTION INTRAVENOUS CONTINUOUS
Status: DISCONTINUED | OUTPATIENT
Start: 2018-09-05 | End: 2018-09-07

## 2018-09-05 RX ADMIN — LATANOPROST 1 DROP: 50 SOLUTION OPHTHALMIC at 21:51

## 2018-09-05 RX ADMIN — POTASSIUM CHLORIDE 10 MEQ: 750 CAPSULE, EXTENDED RELEASE ORAL at 09:29

## 2018-09-05 RX ADMIN — HYDROCODONE BITARTRATE AND ACETAMINOPHEN 1 TABLET: 10; 325 TABLET ORAL at 09:29

## 2018-09-05 RX ADMIN — DILTIAZEM HYDROCHLORIDE 120 MG: 120 CAPSULE, COATED, EXTENDED RELEASE ORAL at 09:29

## 2018-09-05 RX ADMIN — CLINDAMYCIN HYDROCHLORIDE 600 MG: 150 CAPSULE ORAL at 21:51

## 2018-09-05 RX ADMIN — SUCRALFATE 1 G: 1 TABLET ORAL at 18:37

## 2018-09-05 RX ADMIN — ASPIRIN 81 MG 81 MG: 81 TABLET ORAL at 06:17

## 2018-09-05 RX ADMIN — SUCRALFATE 1 G: 1 TABLET ORAL at 09:29

## 2018-09-05 RX ADMIN — HYDROCODONE BITARTRATE AND ACETAMINOPHEN 1 TABLET: 10; 325 TABLET ORAL at 21:51

## 2018-09-05 RX ADMIN — PRAMIPEXOLE DIHYDROCHLORIDE 0.25 MG: 0.25 TABLET ORAL at 21:51

## 2018-09-05 RX ADMIN — ISOSORBIDE MONONITRATE 30 MG: 30 TABLET, EXTENDED RELEASE ORAL at 06:17

## 2018-09-05 RX ADMIN — ENOXAPARIN SODIUM 40 MG: 40 INJECTION SUBCUTANEOUS at 21:51

## 2018-09-05 RX ADMIN — POLYETHYLENE GLYCOL 3350 17 G: 17 POWDER, FOR SOLUTION ORAL at 09:29

## 2018-09-05 RX ADMIN — SODIUM CHLORIDE 50 ML/HR: 9 INJECTION, SOLUTION INTRAVENOUS at 18:38

## 2018-09-05 RX ADMIN — DOCUSATE SODIUM 100 MG: 100 CAPSULE, LIQUID FILLED ORAL at 09:29

## 2018-09-05 RX ADMIN — CLINDAMYCIN HYDROCHLORIDE 600 MG: 150 CAPSULE ORAL at 09:29

## 2018-09-05 RX ADMIN — METOPROLOL TARTRATE 25 MG: 25 TABLET ORAL at 09:29

## 2018-09-05 RX ADMIN — ATORVASTATIN CALCIUM 10 MG: 10 TABLET, FILM COATED ORAL at 09:29

## 2018-09-05 RX ADMIN — GABAPENTIN 200 MG: 100 CAPSULE ORAL at 21:51

## 2018-09-05 RX ADMIN — TRAZODONE HYDROCHLORIDE 200 MG: 100 TABLET ORAL at 21:51

## 2018-09-05 RX ADMIN — METOPROLOL TARTRATE 25 MG: 25 TABLET ORAL at 21:51

## 2018-09-05 RX ADMIN — DOCUSATE SODIUM 100 MG: 100 CAPSULE, LIQUID FILLED ORAL at 21:51

## 2018-09-05 RX ADMIN — DULOXETINE HYDROCHLORIDE 60 MG: 60 CAPSULE, DELAYED RELEASE ORAL at 06:17

## 2018-09-05 RX ADMIN — PANTOPRAZOLE SODIUM 40 MG: 40 TABLET, DELAYED RELEASE ORAL at 06:17

## 2018-09-05 NOTE — PLAN OF CARE
Problem: Skin and Soft Tissue Infection (Adult)  Goal: Signs and Symptoms of Listed Potential Problems Will be Absent, Minimized or Managed (Skin and Soft Tissue Infection)  Outcome: Ongoing (interventions implemented as appropriate)      Problem: Skin Injury Risk (Adult)  Goal: Skin Health and Integrity  Outcome: Ongoing (interventions implemented as appropriate)      Problem: Patient Care Overview  Goal: Plan of Care Review  Outcome: Ongoing (interventions implemented as appropriate)   09/05/18 1101   Coping/Psychosocial   Plan of Care Reviewed With patient   Plan of Care Review   Progress no change   OTHER   Outcome Summary Fever occasional, creatine increased renal consult, no new skin issues, legs wrapped in betsy boots     Goal: Individualization and Mutuality  Outcome: Ongoing (interventions implemented as appropriate)    Goal: Discharge Needs Assessment  Outcome: Ongoing (interventions implemented as appropriate)    Goal: Interprofessional Rounds/Family Conf  Outcome: Ongoing (interventions implemented as appropriate)

## 2018-09-05 NOTE — PLAN OF CARE
Problem: Skin and Soft Tissue Infection (Adult)  Goal: Signs and Symptoms of Listed Potential Problems Will be Absent, Minimized or Managed (Skin and Soft Tissue Infection)  Outcome: Ongoing (interventions implemented as appropriate)   09/05/18 0348   Goal/Outcome Evaluation   Problems Assessed (Skin and Soft Tissue Infection) all   Problems Present (Skin/Soft Tissue Inf) pain       Problem: Skin Injury Risk (Adult)  Goal: Identify Related Risk Factors and Signs and Symptoms  Outcome: Outcome(s) achieved Date Met: 09/05/18 09/02/18 1824   Skin Injury Risk (Adult)   Related Risk Factors (Skin Injury Risk) advanced age;fluid intake inadequate;edema;mobility impaired;moisture     Goal: Skin Health and Integrity  Outcome: Ongoing (interventions implemented as appropriate)   09/05/18 0348   Skin Injury Risk (Adult)   Skin Health and Integrity making progress toward outcome       Problem: Patient Care Overview  Goal: Plan of Care Review  Outcome: Ongoing (interventions implemented as appropriate)   09/05/18 0348   Coping/Psychosocial   Plan of Care Reviewed With patient   Plan of Care Review   Progress no change   OTHER   Outcome Summary Pain controlled with PO medication, temp slightly elevated (100.2) at beginning of shift. Resolved quickly after the temperture in her room was adjusted.

## 2018-09-05 NOTE — THERAPY EVALUATION
Acute Care - Physical Therapy Initial Evaluation  [facility]     Patient Name: Stephania Judd  : 1940  MRN: [id number]  Today's Date: 2018   Onset of Illness/Injury or Date of Surgery: 18  Date of Referral to PT: 18  Referring Physician: MARLENI Olguin      Admit Date: 2018    Visit Dx:     ICD-10-CM ICD-9-CM   1. Cellulitis of lower extremity, unspecified laterality L03.119 682.6   2. AKBAR (acute kidney injury) (CMS/[facility]) N17.9 584.9   3. Impaired functional mobility, balance, gait, and endurance Z74.09 V49.89     Patient Active Problem List   Diagnosis   • Morbid obesity due to excess calories (CMS/[facility])   • Hyperlipidemia   • Essential hypertension   • Nausea   • Epigastric pain   • Encounter for screening for malignant neoplasm of colon   • Chest pain   • Cellulitis of lower extremity   • AKBAR (acute kidney injury) (CMS/[facility])     Past Medical History:   Diagnosis Date   • Arthritis    • Bronchitis    • Carotid artery stenosis     5 years ago, pt was medically managed w/ baby aspirin.   • Carpal tunnel syndrome    • Chest pain    • CHF (congestive heart failure) (CMS/[facility])    • GERD (gastroesophageal reflux disease)    • Glaucoma    • Hyperlipidemia    • Hypertension    • Pulmonary hypertension    • Sleep apnea      Past Surgical History:   Procedure Laterality Date   • BACK SURGERY     • BREAST LUMPECTOMY      right side   • CATARACT EXTRACTION     • COLONOSCOPY     • COLONOSCOPY N/A 2018    Procedure: COLONOSCOPY;  Surgeon: Josesito Garcia MD;  Location: [facility] ENDOSCOPY;  Service:    • ENDOSCOPY N/A 2018    Procedure: ESOPHAGOGASTRODUODENOSCOPY;  Surgeon: Josesito Garcia MD;  Location: [facility] ENDOSCOPY;  Service:    • HYSTERECTOMY      partial   • TONSILLECTOMY          PT ASSESSMENT (last 12 hours)      Physical Therapy Evaluation     Row Name 18 0951          PT Evaluation Time/Intention    Subjective Information complains of;pain  -DAI     Document Type  evaluation  -DAI     Mode of Treatment physical therapy;individual therapy  -DAI     Patient Effort good  -DAI     Symptoms Noted During/After Treatment shortness of breath  -DAI     Row Name 09/05/18 0951          General Information    Patient Profile Reviewed? yes  -DAI     Onset of Illness/Injury or Date of Surgery 09/01/18  -DAI     Referring Physician MARLENI Olguin  -DAI     Patient Observations alert;cooperative;agree to therapy  -DAI     Patient/Family Observations no family present  -DAI     General Observations of Patient Pt sitting EOB;noted IV, B unna boots  -DAI     Prior Level of Function independent:;all household mobility;ADL's;home management;dependent:;driving   going to get someone for cleaning;family drives pt  -DAI     Equipment Currently Used at Home cane, quad;cane, straight;bipap/ cpap;shower chair;grab bar   uses QC for mobility;uses SC like a reacher  -DAI     Pertinent History of Current Functional Problem Pt admitted to [facility] with cellulitis/MRSA BLE, AKBAR  -DAI     Existing Precautions/Restrictions other (see comments)   contact/MRSA  -DAI     Equipment Issued to Patient gait belt  -DAI     Risks Reviewed patient:;LOB;dizziness;increased discomfort;change in vital signs  -DAI     Benefits Reviewed patient:;improve function;increase independence;increase strength;increase balance;decrease pain;decrease risk of DVT;increase knowledge  -DAI     Barriers to Rehab none identified  -DAI     Row Name 09/05/18 0951          Relationship/Environment    Lives With alone  -DAI     Family Caregiver if Needed child(rachel), adult  -     Row Name 09/05/18 0951          Resource/Environmental Concerns    Current Living Arrangements home/apartment/condo   apartment  -     Row Name 09/05/18 0951          Cognitive Assessment/Intervention- PT/OT    Orientation Status (Cognition) oriented x 4  -DAI     Follows Commands (Cognition) WFL  -     Row Name 09/05/18 0951          Safety Issues, Functional Mobility     Impairments Affecting Function (Mobility) endurance/activity tolerance;strength;pain  -[facility] Name 09/05/18 0951          Bed Mobility Assessment/Treatment    Comment (Bed Mobility) not observed;pt sitting EOB   -[facility] Name 09/05/18 0951          Transfer Assessment/Treatment    Transfer Assessment/Treatment sit-stand transfer;stand-sit transfer  -     Sit-Stand [place] (Transfers) supervision  -     Stand-Sit [place] (Transfers) supervision  -     Row Name 09/05/18 0951          Gait/Stairs Assessment/Training    Gait/Stairs Assessment/Training gait/ambulation assistive device  -     [place] Level (Gait) contact guard  -     Assistive Device (Gait) cane, quad  -     Distance in Feet (Gait) 30  -     Comment (Gait/Stairs) Noted pt very SOA with ambulation, but denied lightheadedness/dizziness;O2 sats 95/96% on roomair  -[facility] Name 09/05/18 0951          General ROM    GENERAL ROM COMMENTS AROM WFL all extremities  -[facility] Name 09/05/18 0951          MMT (Manual Muscle Testing)    General MMT Comments BUE: 4-/5 , 3+/5 wrists, elbows shoulders;BLE: 3+/5 ankles/feet, knees, hips  -[facility] Name 09/05/18 0951          Sensory Assessment/Intervention    Sensory General Assessment light touch sensation deficits identified  -[facility] Name 09/05/18 0951          Vision Assessment/Intervention    Visual Impairment/Limitations WFL  -[facility] Name 09/05/18 0951          Light Touch Sensation Assessment    Left Upper Extremity: Light Touch Sensation Assessment other (see comments)  -     Comment, Left Upper Extremity: Light Touch Sensation Assessment pt reports intermittent numbness/tingling from CTS  -     Right Upper Extremity: Light Touch Sensation Assessment other (see comments)  -     Comment, Right Upper Extremity: Light Touch Sensation Assessment pt reports intermittent numbnes and tingling from CTS  -     Left Lower Extremity: Light Touch Sensation  Assessment other (see comments)  -DAI     Comment, Left Lower Extremity: Light Touch Sensation Assessment intact above unna boot;unable to assess lower leg;foot due to unna boot  -DAI     Right Lower Extremity: Light Touch Sensation Assessment other (see comments)  -DAI     Comment, Right Lower Extremity: Light Touch Sensation Assessment intact above unna boot;unable to assess lower leg/foot due to unna boot  -DAI     Row Name 09/05/18 0951          Pain Assessment    Additional Documentation Pain Scale: Numbers Pre/Post-Treatment (Group)  -DAI     Row Name 09/05/18 0951          Pain Scale: Numbers Pre/Post-Treatment    Pain Scale: Numbers, Pretreatment 4/10  -DIA     Pain Scale: Numbers, Post-Treatment 4/10  -DAI     Pain Location - Side Bilateral  -DAI     Pain Location extremity   legs  -DAI     Pain Intervention(s) Medication (See MAR)   pt reports she recently had pain med  -DAI     Row Name             Wound 09/04/18 1434 Bilateral lower leg ulceration, venous    Wound - Properties Group Date first assessed: 09/04/18  -MM Time first assessed: 1434  -MM Present On Admission : other (see comments)  -MM, unknown  Side: Bilateral  -MM Orientation: lower  -MM Location: leg  -MM Type: ulceration, venous  -MM, bilateral lower extremitiy scabs with reddness/ blister     Row Name 09/05/18 0947          Plan of Care Review    Plan of Care Reviewed With patient  -DAI     Row Name 09/05/18 0951          Physical Therapy Clinical Impression    Date of Referral to PT 09/04/18  -DAI     PT Diagnosis (PT Clinical Impression) MARLENI Olguin  -DAI     Prognosis (PT Clinical Impression) good  -DAI     Patient/Family Goals Statement (PT Clinical Impression) return home stronger and able to assist with care  -DAI     Criteria for Skilled Interventions Met (PT Clinical Impression) yes;treatment indicated  -DAI     Pathology/Pathophysiology Noted (Describe Specifically for Each System) musculoskeletal;cardiovascular;pulmonary  -DAI      Impairments Found (describe specific impairments) aerobic capacity/endurance;ergonomics and body mechanics;gait, locomotion, and balance;ventilation and respiration/gas exchange  -     Functional Limitations in Following Categories (Describe Specific Limitations) self-care;home management;community/leisure  -     Disability: Inability to Perform Actions/Activities of Required Roles (describe specific disability) community/leisure  -     Rehab Potential (PT Clinical Summary) good, to achieve stated therapy goals  -     Predicted Duration of Therapy (PT) until discharge or goals met  -     Care Plan Review (PT) evaluation/treatment results reviewed;care plan/treatment goals reviewed;risks/benefits reviewed;current/potential barriers reviewed;patient/other agree to care plan  -     Row Name 09/05/18 0993          Vital Signs    Pre Systolic BP Rehab 120  -DAI     Pre Treatment Diastolic BP 50  -DAI     Post Systolic BP Rehab 112  -DAI     Post Treatment Diastolic BP 58  -DAI     Pretreatment Heart Rate (beats/min) 88  -DAI     Posttreatment Heart Rate (beats/min) 88  -DAI     Pre SpO2 (%) 96  -DAI     O2 Delivery Pre Treatment room air  -DAI     Post SpO2 (%) 95  -DAI     O2 Delivery Post Treatment room air  -DAI     Pre Patient Position Sitting  -DAI     Post Patient Position Sitting  -     Row Name 09/05/18 0946          Physical Therapy Goals    Transfer Goal Selection (PT) transfer, PT goal 1  -DAI     Gait Training Goal Selection (PT) gait training, PT goal 1  -DAI     Strength Goal Selection (PT) strength, PT goal 1  -     Stairs Goal Selection (PT) --  -     Additional Documentation Stairs Goal Selection (PT) (Row);Strength Goal Selection (PT) (Row)  -     Row Name 09/05/18 0951          Transfer Goal 1 (PT)    Activity/Assistive Device (Transfer Goal 1, PT) sit-to-stand/stand-to-sit;bed-to-chair/chair-to-bed  -     [place] Level/Cues Needed (Transfer Goal 1, PT) conditional independence  -DAI      Time Frame (Transfer Goal 1, PT) by discharge  -     Progress/Outcome (Transfer Goal 1, PT) goal not met  -     Row Name 09/05/18 0951          Gait Training Goal 1 (PT)    Activity/Assistive Device (Gait Training Goal 1, PT) gait (walking locomotion);assistive device use  -     [place] Level (Gait Training Goal 1, PT) conditional independence  -     Distance (Gait Goal 1, PT) 100t  -     Time Frame (Gait Training Goal 1, PT) by discharge  -     Progress/Outcome (Gait Training Goal 1, PT) goal not met  -[facility] Name 09/05/18 0951          Strength Goal 1 (PT)    Strength Goal 1 (PT) Pt will tolerate 20 reps of AROM exercises OOB in chair  -     Time Frame (Strength Goal 1, PT) by discharge  -     Progress/Outcome (Strength Goal 1, PT) goal not met  -DAI     Row Name 09/05/18 0951          Patient Education Goal (PT)    Activity (Patient Education Goal, PT) Pt will ambulate up/down ramp with AAD with conditional independence  -     Time Frame (Patient Education Goal, PT) by discharge  -     Progress/Outcome (Patient Education Goal, PT) goal not met  -DAI     Row Name 09/05/18 0951          Positioning and Restraints    Pre-Treatment Position in bed  -     Post Treatment Position bed  -     In Bed sitting EOB;call light within reach;encouraged to call for assist  -DAI     Row Name 09/05/18 0951          Living Environment    Home Accessibility wheelchair accessible   ramp with 2 rails  -       User Key  (r) = Recorded By, (t) = Taken By, (c) = Cosigned By    Initials Name Provider Type    Lisa Cramer PT Physical Therapist    MM Estelle Rosales, RN Registered Nurse          Physical Therapy Education     Title: PT OT SLP Therapies (Active)     Topic: Physical Therapy (Active)     Point: Mobility training (Active)    Learning Progress Summary     Learner Status Readiness Method Response Comment Documented by    Patient Active Acceptance E NR  DAI 09/05/18 7276          Point:  Precautions (Active)    Learning Progress Summary     Learner Status Readiness Method Response Comment Documented by    Patient Active Acceptance E NR   09/05/18 1588                      User Key     Initials Effective Dates Name Provider Type Discipline     04/03/18 -  Lisa Darling, PT Physical Therapist PT                PT Recommendation and Plan  Anticipated Discharge Disposition (PT): home with assist, home with home health  Planned Therapy Interventions (PT Eval): balance training, bed mobility training, gait training, home exercise program, patient/family education, strengthening, transfer training, other (see comments) (ramp training)  Therapy Frequency (PT Clinical Impression): daily  Outcome Summary/Treatment Plan (PT)  Anticipated Discharge Disposition (PT): home with assist, home with home health  Plan of Care Reviewed With: patient  Outcome Summary: PT evaluation completed. Pt transferred sit to stand with CGA and ambulated 30ft with quadcane with CGA. Pt  very SOA with ambulation but denied lightheadedness or dizziness and O2 sats 95-96% on room air.Function limited by decreased strength, balance, and tolerance for functional mobility and activities. Pt will benefit from PT to regain lost function. Anticipate home with assistance at discharge with HH therapy.          Outcome Measures     Row Name 09/05/18 0951             How much help from another person do you currently need...    Turning from your back to your side while in flat bed without using bedrails? 3  -DAI      Moving from lying on back to sitting on the side of a flat bed without bedrails? 3  -DAI      Moving to and from a bed to a chair (including a wheelchair)? 3  -DAI      Standing up from a chair using your arms (e.g., wheelchair, bedside chair)? 3  -DAI      Climbing 3-5 steps with a railing? 3  -DAI      To walk in hospital room? 3  -DAI      AM-PAC 6 Clicks Score 18  -DAI         Functional Assessment    Outcome Measure Options  AM-PAC 6 Clicks Basic Mobility (PT)  -        User Key  (r) = Recorded By, (t) = Taken By, (c) = Cosigned By    Initials Name Provider Type    Lisa Cramer PT Physical Therapist           Time Calculation:         PT Charges     Row Name 09/05/18 1520             Time Calculation    Start Time 0951  -      Stop Time 1030  -DAI      Time Calculation (min) 39 min  -      PT Received On 09/05/18  -      PT Goal Re-Cert Due Date 09/18/18  -         Time Calculation- PT    Total Timed Code Minutes- PT 24 minute(s)  -        User Key  (r) = Recorded By, (t) = Taken By, (c) = Cosigned By    Initials Name Provider Type    Lisa Cramer PT Physical Therapist        Therapy Suggested Charges     Code   Minutes Charges    None           Therapy Charges for Today     Code Description Service Date Service Provider Modifiers Qty    46892083674 HC PT MOBILITY CURRENT 9/5/2018 Lisa Darling, PT GP, CK 1    62261955269 HC PT MOBILITY PROJECTED 9/5/2018 Lisa Darling, PT GP, CI 1    57418591535 HC PT EVAL MOD COMPLEXITY 1 9/5/2018 Lisa Darling, PT GP 1    89261763525 HC GAIT TRAINING EA 15 MIN 9/5/2018 Lisa Darling, PT GP 1    50940323333 HC PT THERAPEUTIC ACT EA 15 MIN 9/5/2018 Lisa Darling, PT GP 1          PT G-Codes  PT Professional Judgement Used?: Yes  Outcome Measure Options: AM-PAC 6 Clicks Basic Mobility (PT)  AM-PAC 6 Clicks Score: 18  Functional Limitation: Mobility: Walking and moving around  Mobility: Walking and Moving Around Current Status (): At least 40 percent but less than 60 percent impaired, limited or restricted  Mobility: Walking and Moving Around Goal Status (): At least 1 percent but less than 20 percent impaired, limited or restricted      Lisa Darling, CORRINE  9/5/2018

## 2018-09-05 NOTE — CONSULTS
/[facility] with bilateral LE swelling and redness.  [address] [place]. [address]  T - [phone]  F - [phone]     Consultation         PATIENT  DEMOGRAPHICS   PATIENT NAME: Stephania Judd                      PHYSICIAN: Josesito Ladd  : 1940  MRN: [id number]    Subjective   SUBJECTIVE   Referring Provider: MARLENI Olguin  Reason for Consultation: AKBAR  History of present illness:   Stephania Judd is a 78 yo woman with a PMH of CHF and hypertension who presented to the ED on  with bilateral LE swelling, redness, and weeping. She was started on doxycycline 2 days prior to admission but had no improvement. On presentation, she was diagnosed with cellulitis and wound cultures were collected. She received one dose of Zosyn in the ED, and was started on levaquin by the hospitalist team.    On admission, her creatinine was 1.32. Her baseline creatinine is 0.8 - 1. She denies a history of kidney disease, hematuria, dysuria, difficulty urinating. She is not diabetic. She was given IVF and her creatinine improved to 1.11.     Wound cultures grew MRSA and she was started on vancomycin on 9/3. Creatinine increased to 1.33 and vanc was d/c'd after 2 doses. She was started on Clindamycin. Lisinopril, HCTZ, and Bumex were held. Creatinine has continued to increase to 1.68.    Past Medical History:   Diagnosis Date   • Arthritis    • Bronchitis    • Carotid artery stenosis     5 years ago, pt was medically managed w/ baby aspirin.   • Carpal tunnel syndrome    • Chest pain    • CHF (congestive heart failure) (CMS/[facility])    • GERD (gastroesophageal reflux disease)    • Glaucoma    • Hyperlipidemia    • Hypertension    • Pulmonary hypertension    • Sleep apnea      Past Surgical History:   Procedure Laterality Date   • BACK SURGERY     • BREAST LUMPECTOMY      right side   • CATARACT EXTRACTION     • COLONOSCOPY     • COLONOSCOPY N/A 2018    Procedure: COLONOSCOPY;   "Surgeon: Josesito Garcia MD;  Location: [facility] ENDOSCOPY;  Service:    • ENDOSCOPY N/A 2/5/2018    Procedure: ESOPHAGOGASTRODUODENOSCOPY;  Surgeon: Josesito Garcia MD;  Location: [facility] ENDOSCOPY;  Service:    • HYSTERECTOMY      partial   • TONSILLECTOMY       Family History   Problem Relation Age of Onset   • Heart disease Mother    • Hypertension Mother    • Hypertension Sister    • Diabetes Sister    • Heart disease Brother    • Hypertension Brother    • Diabetes Brother    • Heart disease Brother    • Hypertension Brother    • Diabetes Brother      Social History   Substance Use Topics   • Smoking status: Former Smoker   • Smokeless tobacco: Never Used      Comment: quit 50 + years ago    • Alcohol use No     Allergies:  Morphine and related     REVIEW OF SYSTEMS    Review of Systems   Constitutional: Negative for chills and fever.   Respiratory: Negative for cough, chest tightness, shortness of breath and wheezing.    Cardiovascular: Positive for leg swelling. Negative for chest pain.   Gastrointestinal: Positive for constipation. Negative for abdominal pain, diarrhea, nausea and vomiting.   Genitourinary: Negative for difficulty urinating, dysuria and hematuria.   Skin: Positive for color change and wound.       Objective   OBJECTIVE   Vital Signs  Temp:  [96.5 °F (35.8 °C)-100.2 °F (37.9 °C)] 97.6 °F (36.4 °C)  Heart Rate:  [66-75] 75  Resp:  [18] 18  BP: (132-152)/(46-67) 138/65    Flowsheet Rows      First Filed Value   Admission Height  162.6 cm (64\") Documented at 09/01/2018 1752   Admission Weight  122 kg (268 lb 6 oz) Documented at 09/01/2018 1752             I/O last 3 completed shifts:  In: 1690 [P.O.:1440; IV Piggyback:250]  Out: -     PHYSICAL EXAM    Physical Exam   Constitutional: She is oriented to person, place, and time. She appears well-developed and well-nourished. No distress.   Cardiovascular: Normal rate, regular rhythm and normal heart sounds.    No murmur heard.  Pulmonary/Chest: Effort " normal and breath sounds normal. No respiratory distress. She has no wheezes. She has no rales.   Abdominal: Soft. She exhibits no distension. There is no tenderness.   Musculoskeletal:   Unna boots in place on both lower legs. Unable to appreciate any edema in extremities.   Neurological: She is alert and oriented to person, place, and time.   Skin: She is not diaphoretic.       RESULTS   Results Review:      Results from last 7 days  Lab Units 09/05/18  0553 09/04/18  0540 09/02/18  0526   SODIUM mmol/L 139 139 138   POTASSIUM mmol/L 3.6 3.5 4.1   CHLORIDE mmol/L 102 101 105   CO2 mmol/L 26.0 27.0 28.0   BUN mg/dL 37* 33* 33*   CREATININE mg/dL 1.68* 1.33* 1.11*   CALCIUM mg/dL 8.7 8.6 8.5   BILIRUBIN mg/dL 0.3 0.4 0.2   ALK PHOS U/L 89 94 72   ALT (SGPT) U/L 23 26 22   AST (SGOT) U/L 18 17 31   GLUCOSE mg/dL 105* 111* 97       Estimated Creatinine Clearance: 37.3 mL/min (A) (by C-G formula based on SCr of 1.68 mg/dL (H)).                  Results from last 7 days  Lab Units 09/05/18  0553 09/04/18  0540 09/02/18  0526 09/01/18  1922   WBC 10*3/mm3 10.04* 9.28 8.39 9.16   HEMOGLOBIN g/dL 9.1* 9.2* 8.0* 8.9*   PLATELETS 10*3/mm3 350 321 290 299              MEDICATIONS      aspirin 81 mg Oral QAM   atorvastatin 10 mg Oral Daily   clindamycin 600 mg Oral BID   cyanocobalamin 1,000 mcg Intramuscular Q28 Days   diltiaZEM  mg Oral Q24H   docusate sodium 100 mg Oral BID   DULoxetine 60 mg Oral QAM   enoxaparin 40 mg Subcutaneous Q24H   gabapentin 200 mg Oral Nightly   isosorbide mononitrate 30 mg Oral QAM   latanoprost 1 drop Both Eyes Nightly   metoprolol tartrate 25 mg Oral Q12H   pantoprazole 40 mg Oral Q AM   polyethylene glycol 17 g Oral Daily   potassium chloride 10 mEq Oral Daily   pramipexole 0.25 mg Oral Nightly   sucralfate 1 g Oral BID AC   traZODone 200 mg Oral Nightly        Prescriptions Prior to Admission   Medication Sig Dispense Refill Last Dose   • albuterol (VENTOLIN HFA) 108 (90 BASE) MCG/ACT  inhaler 2 puffs every 4 hours as needed for breathing 1 inhaler 5 Unknown at Unknown time   • aspirin 81 MG chewable tablet Chew 81 mg Every Morning.   9/1/2018 at Unknown time   • bumetanide (BUMEX) 1 MG tablet Take 1 tablet by mouth Every Morning.   9/1/2018 at Unknown time   • Cholecalciferol 1000 units tablet Take 1,000 Units by mouth Every Morning.   9/1/2018 at Unknown time   • diltiaZEM (TAZTIA XT) 360 MG 24 hr capsule Take 360 mg by mouth Every Night.   8/31/2018 at Unknown time   • doxycycline (VIBRAMYCIN) 100 MG capsule Take 100 mg by mouth 2 (Two) Times a Day.   9/1/2018 at Unknown time   • DULoxetine (CYMBALTA) 60 MG capsule Take 60 mg by mouth Every Morning.   9/1/2018 at Unknown time   • gabapentin (NEURONTIN) 100 MG capsule Take 200 mg by mouth Every Night.   8/31/2018 at Unknown time   • hydrochlorothiazide (HYDRODIURIL) 25 MG tablet Take 25 mg by mouth Every Morning.   9/1/2018 at Unknown time   • HYDROcodone-acetaminophen (NORCO)  MG per tablet Take 1 tablet by mouth Every 12 (Twelve) Hours As Needed for Moderate Pain .   9/1/2018 at Unknown time   • hydrOXYzine (ATARAX) 10 MG tablet Take 10 mg by mouth 3 (Three) Times a Day As Needed for Itching.   9/1/2018 at Unknown time   • isosorbide mononitrate (IMDUR) 30 MG 24 hr tablet Take 30 mg by mouth Every Morning.   9/1/2018 at Unknown time   • latanoprost (XALATAN) 0.005 % ophthalmic solution Administer 1 drop to both eyes Every Night.   8/31/2018 at Unknown time   • lisinopril (PRINIVIL,ZESTRIL) 20 MG tablet Take 40 mg by mouth Every Morning.   9/1/2018 at Unknown time   • meclizine (ANTIVERT) 12.5 MG tablet Take 12.5 mg by mouth Every 12 (Twelve) Hours As Needed for dizziness.      • metoprolol tartrate (LOPRESSOR) 25 MG tablet Take 25 mg by mouth 2 (Two) Times a Day.   9/1/2018 at Unknown time   • pantoprazole (PROTONIX) 40 MG EC tablet Take 1 tablet by mouth Daily. (Patient taking differently: Take 40 mg by mouth Every Morning.) 90 tablet 3  9/1/2018 at Unknown time   • potassium chloride (K-DUR) 10 MEQ CR tablet Take 10 mEq by mouth Every Night.   8/31/2018 at Unknown time   • pramipexole (MIRAPEX) 0.25 MG tablet Take 0.25 mg by mouth Every Night.   8/31/2018 at Unknown time   • pravastatin (PRAVACHOL) 20 MG tablet Take 20 mg by mouth Every Night.   8/31/2018 at Unknown time   • sucralfate (CARAFATE) 1 g tablet Take 1 g by mouth 2 (Two) Times a Day.   9/1/2018 at Unknown time   • tiZANidine (ZANAFLEX) 4 MG tablet Take 2 mg by mouth 2 (Two) Times a Day As Needed for Muscle Spasms.      • traZODone (DESYREL) 100 MG tablet Take 200 mg by mouth Every Night.   Taking     Assessment/Plan   ASSESSMENT / PLAN    Active Problems:    Morbid obesity due to excess calories (CMS/[facility])    Hyperlipidemia    Essential hypertension    Cellulitis of lower extremity    AKBAR (acute kidney injury) (CMS/[facility])    1. AKBAR - baseline creatinine 0.8-1  - AKBAR present on admission began to improve with gentle fluid resuscitation. Current AKBAR is likely due to vancomycin. She received 2 doses, last dose 9/4. Now on clinda  - Agree with holding Bumex, lisinopril, and HCTZ for now.   - Suggest starting gentle IVF at 50 mL/hr and re-evaluating tomorrow.She is not having any soa or orthopnea currently, but will need to monitor her volume status.  - order urinalysis. Girish stain to rule out AIN.    2. CHF  - holding diuretics and lisinopril as above  - She does have a history of hospitalization for CHF exacerbation and volume overload.  - Will need to check daily weights and strict I/O's as we are planning to give fluids    3. HTN  - currently normotensive, continue to monitor as we are holding diuretics and lisinopril         I discussed the patients findings and my recommendations with family and consulting provider         This document has been electronically signed by Josesito Ladd on September 5, 2018 11:58 AM      As the teaching physician, I have personally re-performed the physical  exam and medical decision making activities included in the student note.    Moe Harris MD  9/5/2018  5:22 PM

## 2018-09-05 NOTE — PROGRESS NOTES
[facility] Medicine Services  INPATIENT PROGRESS NOTE    Length of Stay: 3  Date of Admission: 9/1/2018  Primary Care Physician: Provider, No Known    Subjective   Chief Complaint: No complaints    HPI:      9/5/2018:  Kidney function worsened today.  Dr. Harris consulted.  Unna boots placed by wound care.     9/4/2018:  Patient has no complaints today.  Spoke with pharmacy we will change antibiotics to clindamycin oral.  PT/OT to recommend discharge plan.     9/3/2018:  The patient had a positive wound culture for MRSA.  Started Vancomycin.        9/2/2018:  Patient has no complaints today.  Patient's been started on Levaquin.  When cultures pending.  Lab shows a B12 deficiency.  Patient states that she has had chronic problems with gastric ulcers.  B12 shots have been started.    H&P by Dr. Douglass:  Patient is 77 y.o. female presents with past medical history of essential hypertension, hyperlipidemia, morbid obesity, presented to the ED with a complaint of bilateral lower extremity swelling and erythema and weeping.  Patient has been treated by M.D. to you was started on doxycycline to days ago however her symptoms did not improve.  Patient also had Unna boot placement which was tight enough that was causing her circulation to cut off.  Unna boot was taken off at home and patient came to the ER because of weeping crusted wounds.  Patient denies any other symptom at this point.    Review of Systems   Constitutional: Positive for fatigue.   Cardiovascular: Positive for leg swelling.   Skin: Positive for wound.        Bilateral lower extremities have multiple blood-crusted lesions.       All pertinent negatives and positives are as above. All other systems have been reviewed and are negative unless otherwise stated.     Objective    Temp:  [96.5 °F (35.8 °C)-100.2 °F (37.9 °C)] 97.6 °F (36.4 °C)  Heart Rate:  [66-75] 75  Resp:  [18] 18  BP: (132-152)/(46-67) 138/65    Physical  Exam   Constitutional: She is oriented to person, place, and time. She appears well-developed and well-nourished.   HENT:   Head: Normocephalic and atraumatic.   Eyes: Pupils are equal, round, and reactive to light. EOM are normal.   Neck: Normal range of motion. Neck supple.   Cardiovascular: Normal rate and regular rhythm.    Pulmonary/Chest: Effort normal and breath sounds normal.   Abdominal: Soft. Bowel sounds are normal.   Musculoskeletal: Normal range of motion.   Neurological: She is alert and oriented to person, place, and time.   Skin: Skin is warm and dry. There is erythema.        Multiple blood-crusted lesions with erythema and edema.    Psychiatric: She has a normal mood and affect.       Results Review:  I have reviewed the labs, radiology results, and diagnostic studies.    Laboratory Data:     Results from last 7 days  Lab Units 09/05/18  0553 09/04/18  0540 09/02/18  0526   SODIUM mmol/L 139 139 138   POTASSIUM mmol/L 3.6 3.5 4.1   CHLORIDE mmol/L 102 101 105   CO2 mmol/L 26.0 27.0 28.0   BUN mg/dL 37* 33* 33*   CREATININE mg/dL 1.68* 1.33* 1.11*   GLUCOSE mg/dL 105* 111* 97   CALCIUM mg/dL 8.7 8.6 8.5   BILIRUBIN mg/dL 0.3 0.4 0.2   ALK PHOS U/L 89 94 72   ALT (SGPT) U/L 23 26 22   AST (SGOT) U/L 18 17 31   ANION GAP mmol/L 11.0 11.0 5.0     Estimated Creatinine Clearance: 37.3 mL/min (A) (by C-G formula based on SCr of 1.68 mg/dL (H)).            Results from last 7 days  Lab Units 09/05/18  0553 09/04/18  0540 09/02/18  0526 09/01/18  1922   WBC 10*3/mm3 10.04* 9.28 8.39 9.16   HEMOGLOBIN g/dL 9.1* 9.2* 8.0* 8.9*   HEMATOCRIT % 31.1* 30.8* 27.3* 29.9*   PLATELETS 10*3/mm3 350 321 290 299           Culture Data:   No results found for: BLOODCX  No results found for: URINECX  No results found for: RESPCX  No results found for: WOUNDCX  No results found for: STOOLCX  No components found for: BODYFLD    Radiology Data:   Imaging Results (last 24 hours)     ** No results found for the last 24 hours.  **          I have reviewed the patient's current medications.     Assessment/Plan     Active Hospital Problems    Diagnosis Date Noted   • Cellulitis of lower extremity [L03.119] 09/01/2018   • AKBAR (acute kidney injury) (CMS/[facility]) [N17.9] 09/01/2018   • Essential hypertension [I10] 03/20/2017   • Hyperlipidemia [E78.5] 03/20/2017   • Morbid obesity due to excess calories (CMS/[facility]) [E66.01] 03/20/2017       Plan:    1.  Cellulitis of both lower extremities: Wound culture positive for MRSA.  Spoke with Mich from Pharmacy.  Recommends oral Clindamycin.   Vancomycin d/c'd.  2.  Acute kidney injury:  Will hold Lisinopril, HCTZ and Bumex.  Hydration given yesterday 250 cc over 4 hours.  Dr. Harris consulted.      3.  Essential hypertension: Continue home medication.  4.  Hyperlipidemia: Statin  5.  Anemia: Appears to be chronic.  B12 <159.  B12 IM started.   6.  Deconditioning:  PT/OT.        Discharge Planning: I expect patient to be discharged to home in 1-2 days.      This document has been electronically signed by MARLENI Painter on September 5, 2018 1:57 PM

## 2018-09-05 NOTE — PLAN OF CARE
Problem: Patient Care Overview  Goal: Plan of Care Review   09/05/18 5543   Coping/Psychosocial   Plan of Care Reviewed With patient   OTHER   Outcome Summary PT evaluation completed. Pt transferred sit to stand with CGA and ambulated 30ft with quadcane with CGA. Pt very SOA with ambulation but denied lightheadedness or dizziness and O2 sats 95-96% on room air.Function limited by decreased strength, balance, and tolerance for functional mobility and activities. Pt will benefit from PT to regain lost function. Anticipate home with assistance at discharge with  therapy.

## 2018-09-06 LAB
ALBUMIN SERPL-MCNC: 3 G/DL (ref 3.4–4.8)
ALBUMIN/GLOB SERPL: 1 G/DL (ref 1.1–1.8)
ALP SERPL-CCNC: 72 U/L (ref 38–126)
ALT SERPL W P-5'-P-CCNC: 21 U/L (ref 9–52)
ANION GAP SERPL CALCULATED.3IONS-SCNC: 8 MMOL/L (ref 5–15)
AST SERPL-CCNC: 20 U/L (ref 14–36)
BASOPHILS # BLD AUTO: 0.02 10*3/MM3 (ref 0–0.2)
BASOPHILS NFR BLD AUTO: 0.2 % (ref 0–2)
BILIRUB SERPL-MCNC: 0.2 MG/DL (ref 0.2–1.3)
BUN BLD-MCNC: 32 MG/DL (ref 7–21)
BUN/CREAT SERPL: 28.1 (ref 7–25)
CALCIUM SPEC-SCNC: 8.5 MG/DL (ref 8.4–10.2)
CHLORIDE SERPL-SCNC: 106 MMOL/L (ref 95–110)
CO2 SERPL-SCNC: 27 MMOL/L (ref 22–31)
CREAT BLD-MCNC: 1.14 MG/DL (ref 0.5–1)
DEPRECATED RDW RBC AUTO: 55.6 FL (ref 36.4–46.3)
EOSINOPHIL # BLD AUTO: 0.9 10*3/MM3 (ref 0–0.7)
EOSINOPHIL NFR BLD AUTO: 10.9 % (ref 0–7)
ERYTHROCYTE [DISTWIDTH] IN BLOOD BY AUTOMATED COUNT: 18.7 % (ref 11.5–14.5)
GFR SERPL CREATININE-BSD FRML MDRD: 46 ML/MIN/1.73 (ref 39–90)
GLOBULIN UR ELPH-MCNC: 2.9 GM/DL (ref 2.3–3.5)
GLUCOSE BLD-MCNC: 105 MG/DL (ref 60–100)
HANSEL STAIN: NEGATIVE
HCT VFR BLD AUTO: 27.4 % (ref 35–45)
HGB BLD-MCNC: 8.1 G/DL (ref 12–15.5)
IMM GRANULOCYTES # BLD: 0.03 10*3/MM3 (ref 0–0.02)
IMM GRANULOCYTES NFR BLD: 0.4 % (ref 0–0.5)
LYMPHOCYTES # BLD AUTO: 2.47 10*3/MM3 (ref 0.6–4.2)
LYMPHOCYTES NFR BLD AUTO: 29.9 % (ref 10–50)
MCH RBC QN AUTO: 24.2 PG (ref 26.5–34)
MCHC RBC AUTO-ENTMCNC: 29.6 G/DL (ref 31.4–36)
MCV RBC AUTO: 81.8 FL (ref 80–98)
MONOCYTES # BLD AUTO: 0.67 10*3/MM3 (ref 0–0.9)
MONOCYTES NFR BLD AUTO: 8.1 % (ref 0–12)
NEUTROPHILS # BLD AUTO: 4.16 10*3/MM3 (ref 2–8.6)
NEUTROPHILS NFR BLD AUTO: 50.5 % (ref 37–80)
PLATELET # BLD AUTO: 278 10*3/MM3 (ref 150–450)
PMV BLD AUTO: 9.5 FL (ref 8–12)
POTASSIUM BLD-SCNC: 3.8 MMOL/L (ref 3.5–5.1)
PROT SERPL-MCNC: 5.9 G/DL (ref 6.3–8.6)
RBC # BLD AUTO: 3.35 10*6/MM3 (ref 3.77–5.16)
SODIUM BLD-SCNC: 141 MMOL/L (ref 137–145)
WBC NRBC COR # BLD: 8.25 10*3/MM3 (ref 3.2–9.8)

## 2018-09-06 PROCEDURE — 97110 THERAPEUTIC EXERCISES: CPT

## 2018-09-06 PROCEDURE — 25010000002 ENOXAPARIN PER 10 MG: Performed by: FAMILY MEDICINE

## 2018-09-06 PROCEDURE — 80053 COMPREHEN METABOLIC PANEL: CPT | Performed by: NURSE PRACTITIONER

## 2018-09-06 PROCEDURE — 85025 COMPLETE CBC W/AUTO DIFF WBC: CPT | Performed by: NURSE PRACTITIONER

## 2018-09-06 PROCEDURE — 97116 GAIT TRAINING THERAPY: CPT

## 2018-09-06 PROCEDURE — 97530 THERAPEUTIC ACTIVITIES: CPT

## 2018-09-06 RX ORDER — CLINDAMYCIN HYDROCHLORIDE 150 MG/1
450 CAPSULE ORAL EVERY 8 HOURS SCHEDULED
Status: DISCONTINUED | OUTPATIENT
Start: 2018-09-06 | End: 2018-09-07 | Stop reason: HOSPADM

## 2018-09-06 RX ADMIN — SUCRALFATE 1 G: 1 TABLET ORAL at 08:46

## 2018-09-06 RX ADMIN — GABAPENTIN 200 MG: 100 CAPSULE ORAL at 21:56

## 2018-09-06 RX ADMIN — TRAZODONE HYDROCHLORIDE 200 MG: 100 TABLET ORAL at 21:57

## 2018-09-06 RX ADMIN — METOPROLOL TARTRATE 25 MG: 25 TABLET ORAL at 08:48

## 2018-09-06 RX ADMIN — SODIUM CHLORIDE 50 ML/HR: 9 INJECTION, SOLUTION INTRAVENOUS at 17:32

## 2018-09-06 RX ADMIN — ASPIRIN 81 MG 81 MG: 81 TABLET ORAL at 07:00

## 2018-09-06 RX ADMIN — LATANOPROST 1 DROP: 50 SOLUTION OPHTHALMIC at 21:57

## 2018-09-06 RX ADMIN — ISOSORBIDE MONONITRATE 30 MG: 30 TABLET, EXTENDED RELEASE ORAL at 07:00

## 2018-09-06 RX ADMIN — METOPROLOL TARTRATE 25 MG: 25 TABLET ORAL at 21:56

## 2018-09-06 RX ADMIN — DULOXETINE HYDROCHLORIDE 60 MG: 60 CAPSULE, DELAYED RELEASE ORAL at 07:00

## 2018-09-06 RX ADMIN — DOCUSATE SODIUM 100 MG: 100 CAPSULE, LIQUID FILLED ORAL at 21:56

## 2018-09-06 RX ADMIN — SUCRALFATE 1 G: 1 TABLET ORAL at 17:31

## 2018-09-06 RX ADMIN — ATORVASTATIN CALCIUM 10 MG: 10 TABLET, FILM COATED ORAL at 08:47

## 2018-09-06 RX ADMIN — CLINDAMYCIN HYDROCHLORIDE 450 MG: 150 CAPSULE ORAL at 21:56

## 2018-09-06 RX ADMIN — DOCUSATE SODIUM 100 MG: 100 CAPSULE, LIQUID FILLED ORAL at 08:47

## 2018-09-06 RX ADMIN — DILTIAZEM HYDROCHLORIDE 120 MG: 120 CAPSULE, COATED, EXTENDED RELEASE ORAL at 08:48

## 2018-09-06 RX ADMIN — ENOXAPARIN SODIUM 40 MG: 40 INJECTION SUBCUTANEOUS at 21:57

## 2018-09-06 RX ADMIN — PRAMIPEXOLE DIHYDROCHLORIDE 0.25 MG: 0.25 TABLET ORAL at 21:56

## 2018-09-06 RX ADMIN — CLINDAMYCIN HYDROCHLORIDE 600 MG: 150 CAPSULE ORAL at 08:47

## 2018-09-06 RX ADMIN — POTASSIUM CHLORIDE 10 MEQ: 750 CAPSULE, EXTENDED RELEASE ORAL at 08:48

## 2018-09-06 RX ADMIN — PANTOPRAZOLE SODIUM 40 MG: 40 TABLET, DELAYED RELEASE ORAL at 07:00

## 2018-09-06 NOTE — PLAN OF CARE
Problem: Skin Injury Risk (Adult)  Goal: Skin Health and Integrity  Outcome: Ongoing (interventions implemented as appropriate)      Problem: Patient Care Overview  Goal: Plan of Care Review  Outcome: Ongoing (interventions implemented as appropriate)   09/06/18 1233   Coping/Psychosocial   Plan of Care Reviewed With patient   Plan of Care Review   Progress improving   OTHER   Outcome Summary vss, creatine improving, pain controlled

## 2018-09-06 NOTE — PROGRESS NOTES
"    [facility] Medicine Services  INPATIENT PROGRESS NOTE    Length of Stay: 4  Date of Admission: 9/1/2018  Primary Care Physician: Provider, No Known    Subjective   Chief Complaint: Leg swelling and redness  HPI:  77 year old female with a history of HTN, HLD, and morbid obesity who presented with bilateral lower extremity swelling, erythema, and weeping.  She failed doxycycline outpatient for this.  She was admitted and started on IV vancomycin.  Wound culture grew MRSA.  Wound care consult was obtained.  She was recommended unnaboots and coban and legs were wrapped.  Her creatinine was elevated on admission to 1.32 from a baseline of 0.8-1.  This increased to 1.68 on vancomycin.  This was discontinued along with lisinopril, HCTZ, and Bumex. She was initiated on clindamycin by sensitivities.  Nephrology consulted.  Creatinine has improved with IV fluid and holding nephrotoxins.  She requested I call a family member to discuss her care but the number provided was \"no longer in service.\"      Review of Systems   Constitutional: Negative for chills and fever.   Respiratory: Negative for shortness of breath.    Cardiovascular: Positive for leg swelling. Negative for chest pain.   Gastrointestinal: Negative for abdominal pain, diarrhea, nausea and vomiting.        All pertinent negatives and positives are as above. All other systems have been reviewed and are negative unless otherwise stated.     Objective    Temp:  [96.8 °F (36 °C)-97.9 °F (36.6 °C)] 97.1 °F (36.2 °C)  Heart Rate:  [70-79] 76  Resp:  [16-20] 18  BP: (113-162)/(59-78) 146/63    Physical Exam   Constitutional: She is oriented to person, place, and time. She appears well-developed and well-nourished.   HENT:   Head: Normocephalic.   Eyes: Conjunctivae are normal.   Cardiovascular: Normal rate, regular rhythm, normal heart sounds and intact distal pulses.    Pulmonary/Chest: Effort normal and breath sounds normal. "   Abdominal: Soft. Bowel sounds are normal. She exhibits no distension. There is no tenderness.   Musculoskeletal: She exhibits edema (lower extremity). She exhibits no deformity.   Neurological: She is alert and oriented to person, place, and time.   Skin: Skin is warm and dry. She is not diaphoretic.   unnaboots in place   Vitals reviewed.      Results Review:  I have reviewed the labs, radiology results, and diagnostic studies.    Laboratory Data:     Results from last 7 days  Lab Units 09/06/18  0527 09/05/18  0553 09/04/18  0540   SODIUM mmol/L 141 139 139   POTASSIUM mmol/L 3.8 3.6 3.5   CHLORIDE mmol/L 106 102 101   CO2 mmol/L 27.0 26.0 27.0   BUN mg/dL 32* 37* 33*   CREATININE mg/dL 1.14* 1.68* 1.33*   GLUCOSE mg/dL 105* 105* 111*   CALCIUM mg/dL 8.5 8.7 8.6   BILIRUBIN mg/dL 0.2 0.3 0.4   ALK PHOS U/L 72 89 94   ALT (SGPT) U/L 21 23 26   AST (SGOT) U/L 20 18 17   ANION GAP mmol/L 8.0 11.0 11.0     Estimated Creatinine Clearance: 55.2 mL/min (A) (by C-G formula based on SCr of 1.14 mg/dL (H)).            Results from last 7 days  Lab Units 09/06/18  0527 09/05/18  0553 09/04/18  0540 09/02/18  0526 09/01/18  1922   WBC 10*3/mm3 8.25 10.04* 9.28 8.39 9.16   HEMOGLOBIN g/dL 8.1* 9.1* 9.2* 8.0* 8.9*   HEMATOCRIT % 27.4* 31.1* 30.8* 27.3* 29.9*   PLATELETS 10*3/mm3 278 350 321 290 299           Culture Data:   No results found for: BLOODCX  No results found for: URINECX  No results found for: RESPCX  No results found for: WOUNDCX  No results found for: STOOLCX  No components found for: BODYFLD    Radiology Data:   Imaging Results (last 24 hours)     ** No results found for the last 24 hours. **          I have reviewed the patient's current medications.     Assessment/Plan     Hospital Problem List     * (Principal)Cellulitis of lower extremity    Morbid obesity due to excess calories (CMS/[facility])    Hyperlipidemia    Essential hypertension    AKBAR (acute kidney injury) (CMS/[facility])          Plan:    Clindamycin 450 mg  q 8 hours for nonpurulent MRSA cellulitis  Unnaboots, wound center following  IV fluid: NS @ 50 ml/hr  Hold nephrotoxins and diuretics  Follow creatinine  Appreciate nephrology consultation           This document has been electronically signed by MARLENI Keane on September 6, 2018 2:11 PM

## 2018-09-06 NOTE — PLAN OF CARE
Problem: Skin and Soft Tissue Infection (Adult)  Goal: Signs and Symptoms of Listed Potential Problems Will be Absent, Minimized or Managed (Skin and Soft Tissue Infection)  Outcome: Ongoing (interventions implemented as appropriate)   09/05/18 2304   Goal/Outcome Evaluation   Problems Assessed (Skin and Soft Tissue Infection) all   Problems Present (Skin/Soft Tissue Inf) pain       Problem: Skin Injury Risk (Adult)  Goal: Skin Health and Integrity   09/05/18 2304   Skin Injury Risk (Adult)   Skin Health and Integrity making progress toward outcome       Problem: Patient Care Overview  Goal: Plan of Care Review  Outcome: Ongoing (interventions implemented as appropriate)   09/05/18 2304   Coping/Psychosocial   Plan of Care Reviewed With patient   Plan of Care Review   Progress no change   OTHER   Outcome Summary VSS, pain controlled, urine speciman obtained and sent to lab

## 2018-09-06 NOTE — THERAPY TREATMENT NOTE
Acute Care - Physical Therapy Treatment Note  [facility]     Patient Name: Stephania Judd  : 1940  MRN: [id number]  Today's Date: 2018  Onset of Illness/Injury or Date of Surgery: 18  Date of Referral to PT: 18  Referring Physician: MARLENI Olguin    Admit Date: 2018    Visit Dx:    ICD-10-CM ICD-9-CM   1. Cellulitis of lower extremity, unspecified laterality L03.119 682.6   2. AKBAR (acute kidney injury) (CMS/HCC) N17.9 584.9   3. Impaired functional mobility, balance, gait, and endurance Z74.09 V49.89     Patient Active Problem List   Diagnosis   • Morbid obesity due to excess calories (CMS/HCC)   • Hyperlipidemia   • Essential hypertension   • Nausea   • Epigastric pain   • Encounter for screening for malignant neoplasm of colon   • Chest pain   • Cellulitis of lower extremity   • AKBAR (acute kidney injury) (CMS/HCC)       Therapy Treatment          Rehabilitation Treatment Summary     Row Name 18 1011             Treatment Time/Intention    Discipline physical therapy assistant  -DAI      Document Type --  -DAI      Mode of Treatment physical therapy;individual therapy  -DAI      Therapy Frequency (PT Clinical Impression) daily  -DAI      Patient Effort good  -DAI      Existing Precautions/Restrictions other (see comments)   contact/MRSA  -DAI      Recorded by [DAI] Abram Antoine, PTA 18 1046      Row Name 18 1011             Vital Signs    Pre Systolic BP Rehab 142  -DAI      Pre Treatment Diastolic BP 60  -DAI      Post Systolic BP Rehab 132  -JC2      Post Treatment Diastolic BP 58  -JC2      Pretreatment Heart Rate (beats/min) 64  -DAI      Intratreatment Heart Rate (beats/min) 111  -JC2      Posttreatment Heart Rate (beats/min) 71  -JC2      Pre SpO2 (%) 98  -DAI      O2 Delivery Pre Treatment room air  -DAI      Intra SpO2 (%) 93  -JC2      O2 Delivery Intra Treatment room air  -JC2      Post SpO2 (%) 98  -JC2      O2 Delivery Post Treatment room air  -JC2       Pre Patient Position Sitting  -DAI      Post Patient Position Sitting  -DAI      Recorded by [DAI] Abram Antoine, PTA 09/06/18 1046  [JC2] Abram Antoine, PTA 09/06/18 1253      Row Name 09/06/18 1011             Cognitive Assessment/Intervention- PT/OT    Orientation Status (Cognition) oriented x 4  -DAI      Follows Commands (Cognition) WFL  -DAI      Recorded by [DAI] Abram Antoine, PTA 09/06/18 1046      Row Name 09/06/18 1011             Safety Issues, Functional Mobility    Impairments Affecting Function (Mobility) --  -DAI      Recorded by [DAI] Abram Antoine, PTA 09/06/18 1046      Row Name 09/06/18 1011             Transfer Assessment/Treatment    Transfer Assessment/Treatment sit-stand transfer;stand-sit transfer  -DAI      Comment (Transfers) sit-stands x10  -JC2      Recorded by [DAI] Abram Antoine, PTA 09/06/18 1046  [JC2] Abram Antoine, PTA 09/06/18 1253      Row Name 09/06/18 1011             Sit-Stand Transfer    Sit-Stand [place] (Transfers) supervision  -DAI      Assistive Device (Sit-Stand Transfers) cane, quad  -JC2      Recorded by [DAI] Abram Antoine, PTA 09/06/18 1046  [JC2] Abram Antoine, PTA 09/06/18 1253      Row Name 09/06/18 1011             Stand-Sit Transfer    Stand-Sit [place] (Transfers) supervision  -DAI      Assistive Device (Stand-Sit Transfers) cane, quad  -JC2      Recorded by [DAI] Abram Antoine, PTA 09/06/18 1046  [JC2] Abram Antoine, PTA 09/06/18 1253      Row Name 09/06/18 1011             Gait/Stairs Assessment/Training    Gait/Stairs Assessment/Training gait/ambulation assistive device  -DAI      [place] Level (Gait) contact guard  -DAI      Assistive Device (Gait) cane, quad  -DAI      Distance in Feet (Gait) 80  -JC2      Pattern (Gait) step-through  -JC2      Deviations/Abnormal Patterns (Gait) antalgic;[place] decreased  -JC2      Recorded by [DAI] Abram Antoine, PTA 09/06/18 1046  [JC2] Abram Antoine, PTA 09/06/18 1253      Row Name  09/06/18 1011             Therapeutic Exercise    Therapeutic Exercise seated, lower extremities  -DAI      Recorded by [DAI] Abram Antoine, PTA 09/06/18 1253      Row Name 09/06/18 1011             Lower Extremity Seated Therapeutic Exercise    Performed, Seated Lower Extremity (Therapeutic Exercise) ankle dorsiflexion/plantarflexion;LAQ (long arc quad), knee extension;hip flexion/extension  -DAI      Exercise Type, Seated Lower Extremity (Therapeutic Exercise) AROM (active range of motion)  -DAI      Sets/Reps Detail, Seated Lower Extremity (Therapeutic Exercise) 20x1  -DAI      Recorded by [DAI] Abram Antoine, PTA 09/06/18 1253      Row Name 09/06/18 1011             Pain Scale: Numbers Pre/Post-Treatment    Pain Scale: Numbers, Pretreatment 4/10  -DAI      Pain Scale: Numbers, Post-Treatment 4/10  -DAI      Pain Location - Side Bilateral  -DAI      Pain Location extremity   legs and R hand  -JC2      Recorded by [DAI] Abram Antoine, PTA 09/06/18 1046  [JC2] Abram Antoine, PTA 09/06/18 1253      Row Name 09/06/18 1011             Sensory Assessment/Intervention    Sensory General Assessment --  -DAI      Recorded by [DAI] Abram Antoine, PTA 09/06/18 1253      Row Name 09/06/18 1011             Vision Assessment/Intervention    Visual Impairment/Limitations --  -DAI      Recorded by [DAI] Abram Antoine, PTA 09/06/18 1253      Row Name 09/06/18 1011             Light Touch Sensation Assessment    Left Upper Extremity: Light Touch Sensation Assessment --  -DAI      Right Upper Extremity: Light Touch Sensation Assessment --  -DAI      Left Lower Extremity: Light Touch Sensation Assessment --  -DAI      Right Lower Extremity: Light Touch Sensation Assessment --  -DAI      Recorded by [DAI] Abram Antoine, PTA 09/06/18 1253      Row Name                Wound 09/04/18 1434 Bilateral lower leg ulceration, venous    Wound - Properties Group Date first assessed: 09/04/18 [MM] Time first assessed: 1434 [MM] Present On  Admission : other (see comments) [MM], unknown  Side: Bilateral [MM] Orientation: lower [MM] Location: leg [MM] Type: ulceration, venous [MM], bilateral lower extremitiy scabs with reddness/ blister  Recorded by:  [MM] Estelle Rosales, RN 09/04/18 1435    Row Name 09/06/18 1011             Outcome Summary/Treatment Plan (PT)    Daily Summary of Progress (PT) progress toward functional goals as expected  -DAI      Plan for Continued Treatment (PT) continue  -DAI      Anticipated Discharge Disposition (PT) home with assist;home with home health  -JC2      Recorded by [DAI] Abram Antoine, PTA 09/06/18 1253  [JC2] Abram Antoine, PTA 09/06/18 1046        User Key  (r) = Recorded By, (t) = Taken By, (c) = Cosigned By    Initials Name Effective Dates Discipline    MM Estelle Rosales RN 10/17/16 -  Nurse    DAI Abram Antoine, PTA 03/07/18 -  PT          Wound 09/04/18 1434 Bilateral lower leg ulceration, venous (Active)   Dressing Appearance intact;dry 9/6/2018  8:47 AM   Closure SANTOS 9/6/2018  8:47 AM               PT Rehab Goals     Row Name 09/06/18 0830             Transfer Goal 1 (PT)    Activity/Assistive Device (Transfer Goal 1, PT) sit-to-stand/stand-to-sit;bed-to-chair/chair-to-bed  -DAI      [place] Level/Cues Needed (Transfer Goal 1, PT) conditional independence  -DAI      Time Frame (Transfer Goal 1, PT) by discharge  -DAI      Progress/Outcome (Transfer Goal 1, PT) goal not met  -DAI         Gait Training Goal 1 (PT)    Activity/Assistive Device (Gait Training Goal 1, PT) gait (walking locomotion);assistive device use  -DAI      [place] Level (Gait Training Goal 1, PT) conditional independence  -DAI      Distance (Gait Goal 1, PT) 100t  -DAI      Time Frame (Gait Training Goal 1, PT) by discharge  -DAI      Progress/Outcome (Gait Training Goal 1, PT) goal not met  -DAI         Strength Goal 1 (PT)    Strength Goal 1 (PT) Pt will tolerate 20 reps of AROM exercises OOB in chair  -DAI      Time Frame  (Strength Goal 1, PT) by discharge  -      Progress/Outcome (Strength Goal 1, PT) goal not met  -         Patient Education Goal (PT)    Activity (Patient Education Goal, PT) Pt will ambulate up/down ramp with AAD with conditional independence  -      Time Frame (Patient Education Goal, PT) by discharge  -      Progress/Outcome (Patient Education Goal, PT) goal not met  -        User Key  (r) = Recorded By, (t) = Taken By, (c) = Cosigned By    Initials Name Provider Type Discipline     Abram Antoine, KARISSA Physical Therapy Assistant PT          Physical Therapy Education     Title: PT OT SLP Therapies (Active)     Topic: Physical Therapy (Active)     Point: Mobility training (Active)    Learning Progress Summary     Learner Status Readiness Method Response Comment Documented by    Patient Active Acceptance E NR   09/06/18 1254     Active Acceptance E NR  [facility] 09/05/18 1517          Point: Precautions (Active)    Learning Progress Summary     Learner Status Readiness Method Response Comment Documented by    Patient Active Acceptance E NR  [facility] 09/05/18 1517                      User Key     Initials Effective Dates Name Provider Type Discipline    [facility] 04/03/18 -  Lisa Darling, PT Physical Therapist PT     03/07/18 -  Abram Antoine PTA Physical Therapy Assistant PT                    PT Recommendation and Plan  Anticipated Discharge Disposition (PT): home with assist, home with home health  Therapy Frequency (PT Clinical Impression): daily  Outcome Summary/Treatment Plan (PT)  Daily Summary of Progress (PT): progress toward functional goals as expected  Plan for Continued Treatment (PT): continue  Anticipated Discharge Disposition (PT): home with assist, home with home health  Plan of Care Reviewed With: patient  Progress: improving  Outcome Summary: pt responded well to therapy w/ increased gait to 80 ft CGA w/ quad cane. pt participated in LE ther ex and sit-stands. vitals stable throughout tx.  no new goals met at this time. Recommend  PT upon DC.           Outcome Measures     Row Name 09/06/18 0830 09/05/18 0951          How much help from another person do you currently need...    Turning from your back to your side while in flat bed without using bedrails? 3  -DAI 3  -JCA     Moving from lying on back to sitting on the side of a flat bed without bedrails? 3  -DAI 3  -JCA     Moving to and from a bed to a chair (including a wheelchair)? 3  -DAI 3  -JCA     Standing up from a chair using your arms (e.g., wheelchair, bedside chair)? 3  -DAI 3  -JCA     Climbing 3-5 steps with a railing? 3  -DAI 3  -JCA     To walk in hospital room? 3  -DAI 3  -JCA     AM-PAC 6 Clicks Score 18  -DAI 18  -JCA        Functional Assessment    Outcome Measure Options AM-PAC 6 Clicks Basic Mobility (PT)  -DAI AM-PAC 6 Clicks Basic Mobility (PT)  -JCA       User Key  (r) = Recorded By, (t) = Taken By, (c) = Cosigned By    Initials Name Provider Type    JC Lisa Darling, PT Physical Therapist    Abram Duran PTA Physical Therapy Assistant           Time Calculation:         PT Charges     Row Name 09/06/18 1257             Time Calculation    Start Time 1011  -      Stop Time 1051  -      Time Calculation (min) 40 min  -         Time Calculation- PT    Total Timed Code Minutes- PT 40 minute(s)  -        User Key  (r) = Recorded By, (t) = Taken By, (c) = Cosigned By    Initials Name Provider Type    DAI Abram Antoine PTA Physical Therapy Assistant        Therapy Suggested Charges     Code   Minutes Charges    None           Therapy Charges for Today     Code Description Service Date Service Provider Modifiers Qty    66461166205 HC GAIT TRAINING EA 15 MIN 9/6/2018 Abram Antoine, PTA GP 1    05913379488 HC PT THER PROC EA 15 MIN 9/6/2018 Abram Antoine, PTA GP 1    03664544113 HC PT THERAPEUTIC ACT EA 15 MIN 9/6/2018 Abram Antoine, PTA GP 1          PT G-Codes  PT Professional Judgement Used?: Yes  Outcome  Measure Options: AM-PAC 6 Clicks Basic Mobility (PT)  AM-PAC 6 Clicks Score: 18  Functional Limitation: Mobility: Walking and moving around  Mobility: Walking and Moving Around Current Status (): At least 40 percent but less than 60 percent impaired, limited or restricted  Mobility: Walking and Moving Around Goal Status (): At least 1 percent but less than 20 percent impaired, limited or restricted    Abram Antoine, PTA  9/6/2018

## 2018-09-06 NOTE — PLAN OF CARE
Problem: Patient Care Overview  Goal: Plan of Care Review  Outcome: Ongoing (interventions implemented as appropriate)   09/06/18 1011   Coping/Psychosocial   Plan of Care Reviewed With patient   Plan of Care Review   Progress improving   OTHER   Outcome Summary pt responded well to therapy w/ increased gait to 80 ft CGA w/ quad cane. pt participated in LE ther ex and sit-stands. vitals stable throughout tx. no new goals met at this time. Recommend HH PT upon DC.

## 2018-09-06 NOTE — PROGRESS NOTES
"[facility]  [address] [place] [address]. [id number]  T - [phone]  F - [phone]     Progress Note          PATIENT  DEMOGRAPHICS   PATIENT NAME: Stephania Judd                      PHYSICIAN: Moe Harris MD  : 1940  MRN: [id number]   LOS: 4 days    Patient Care Team:  Provider, No Known as PCP - General  Dorian Baird APRN as PCP - Claims Attributed  Subjective   SUBJECTIVE   Feels well no marked dyspnea         Objective   OBJECTIVE   Vital Signs  Temp:  [96.8 °F (36 °C)-97.9 °F (36.6 °C)] 97 °F (36.1 °C)  Heart Rate:  [70-79] 79  Resp:  [16-20] 16  BP: (113-162)/(59-78) 158/72    Flowsheet Rows      First Filed Value   Admission Height  162.6 cm (64\") Documented at 2018   Admission Weight  122 kg (268 lb 6 oz) Documented at 2018 175           I/O last 3 completed shifts:  In: 1560 [P.O.:1560]  Out: 400 [Urine:400]    PHYSICAL EXAM    Physical Exam   Constitutional: She is oriented to person, place, and time. She appears well-developed.   HENT:   Head: Normocephalic.   Eyes: Pupils are equal, round, and reactive to light.   Cardiovascular: Normal rate, regular rhythm and normal heart sounds.    Pulmonary/Chest: Effort normal and breath sounds normal.   Abdominal: Soft. Bowel sounds are normal.   Musculoskeletal:   Jacquie boots in place   Neurological: She is alert and oriented to person, place, and time.       RESULTS   Results Review:      Results from last 7 days  Lab Units 18  0527 18  0553 18  0540   SODIUM mmol/L 141 139 139   POTASSIUM mmol/L 3.8 3.6 3.5   CHLORIDE mmol/L 106 102 101   CO2 mmol/L 27.0 26.0 27.0   BUN mg/dL 32* 37* 33*   CREATININE mg/dL 1.14* 1.68* 1.33*   CALCIUM mg/dL 8.5 8.7 8.6   BILIRUBIN mg/dL 0.2 0.3 0.4   ALK PHOS U/L 72 89 94   ALT (SGPT) U/L 21 23 26   AST (SGOT) U/L 20 18 17   GLUCOSE mg/dL 105* 105* 111*       Estimated Creatinine Clearance: 55.2 mL/min (A) (by C-G formula based on SCr of 1.14 " mg/dL (H)).                  Results from last 7 days  Lab Units 09/06/18  0527 09/05/18  0553 09/04/18  0540 09/02/18  0526 09/01/18  1922   WBC 10*3/mm3 8.25 10.04* 9.28 8.39 9.16   HEMOGLOBIN g/dL 8.1* 9.1* 9.2* 8.0* 8.9*   PLATELETS 10*3/mm3 278 350 321 290 299               Imaging Results (last 24 hours)     ** No results found for the last 24 hours. **           MEDICATIONS      aspirin 81 mg Oral QAM   atorvastatin 10 mg Oral Daily   clindamycin 600 mg Oral BID   cyanocobalamin 1,000 mcg Intramuscular Q28 Days   diltiaZEM  mg Oral Q24H   docusate sodium 100 mg Oral BID   DULoxetine 60 mg Oral QAM   enoxaparin 40 mg Subcutaneous Q24H   gabapentin 200 mg Oral Nightly   isosorbide mononitrate 30 mg Oral QAM   latanoprost 1 drop Both Eyes Nightly   metoprolol tartrate 25 mg Oral Q12H   pantoprazole 40 mg Oral Q AM   polyethylene glycol 17 g Oral Daily   potassium chloride 10 mEq Oral Daily   pramipexole 0.25 mg Oral Nightly   sucralfate 1 g Oral BID AC   traZODone 200 mg Oral Nightly       sodium chloride 50 mL/hr Last Rate: 50 mL/hr (09/05/18 1838)       Assessment/Plan   ASSESSMENT / PLAN    Active Problems:    Morbid obesity due to excess calories (CMS/[facility])    Hyperlipidemia    Essential hypertension    Cellulitis of lower extremity    AKBAR (acute kidney injury) (CMS/[facility])       1. AKBAR - baseline creatinine 0.8-1  - AKBAR present on admission began to improve with gentle fluid resuscitation. Current AKBAR is likely due to vancomycin. She received 2 doses, last dose 9/4. Now on clinda  - Agree with holding Bumex, lisinopril, and HCTZ for now.   - keep ivf for another day. Cr lot better. Girish negative. May need diuretics back from tomorrow     2. CHF  - holding diuretics and lisinopril as above  - She does have a history of hospitalization for CHF exacerbation and volume overload.  - Will need to check daily weights and strict I/O's as we are planning to give fluids     3. HTN  - currently normotensive,  continue to monitor as we are holding diuretics and lisinopril                This document has been electronically signed by Moe Harris MD on September 6, 2018 9:50 AM

## 2018-09-06 NOTE — CONSULTS
"Adult Nutrition  Assessment    Patient Name:  Stephania Judd  YOB: 1940  MRN: [id number]  Admit Date:  9/1/2018    Assessment Date:  9/6/2018    Comments: visited due to LOS.  Pt reports an excellent appetite.  Her intake is ~100%.  She agreed to written diet information on Diabetes & Nutrition; Low sodium eating plan; DASH diet & Fluid restrictions.  Written diet copies will be provided at discharge along with contact numbers.  Nutrition is appropriate for condition at this time.            Reason for Assessment     Row Name 09/06/18 1436          Reason for Assessment    Reason For Assessment follow-up protocol               Nutrition/Diet History     Row Name 09/06/18 1436          Nutrition/Diet History    Typical Food/Fluid Intake Pt reports that her meals are going well.  Denies any Gi distress.  \"the food is delicous\"               Labs/Tests/Procedures/Meds     Row Name 09/06/18 1441          Labs/Procedures/Meds    Lab Results Reviewed reviewed, pertinent        Diagnostic Tests/Procedures    Diagnostic Test/Procedure Reviewed reviewed, pertinent        Medications    Pertinent Medications Reviewed reviewed, pertinent             Physical Findings     Row Name 09/06/18 1441          Physical Findings    Skin edema;other (see comments)   cellulitis             Estimated/Assessed Needs     Row Name 09/06/18 1442          Calculation Measurements    Weight Used For Calculations 122 kg (270 lb)        Estimated/Assessed Needs    Additional Documentation Additional Requirements (Group);KCAL/KG (Group);Calorie Requirements (Group);[place]-St. Jeor Equation (Group);Protein Requirements (Group);Fluid Requirements (Group)        Calorie Requirements    Estimated Calorie Requirement (kcal/day) 1700        KCAL/KG    14 Kcal/Kg (kcal) 1714.59     15 Kcal/Kg (kcal) 1837.06     18 Kcal/Kg (kcal) 2204.48     20 Kcal/Kg (kcal) 2449.42     25 Kcal/Kg (kcal) 3061.78     30 Kcal/Kg (kcal) 3674.13     " 35 Kcal/Kg (kcal) 4286.48     40 Kcal/Kg (kcal) 4898.84     45 Kcal/Kg (kcal) 5511.2     50 Kcal/Kg (kcal) 6123.55     kcal/kg (Specify) 18        [place]-St. Jeor Equation    RMR ([place]-St. Jeor Equation) 1704.71        Protein Requirements    Est Protein Requirement Amount (gms/kg) --   72 gms (1.0 gms/kg Adjusted body wt)        Fluid Requirements    Estimated Fluid Requirements (mL/day) 1700     Estimated Fluid Requirement Method RDA Method     RDA Method (mL) 1700     Fabrice-Han Method (over 20 kg) 3949.42             Nutrition Prescription Ordered     Row Name 09/06/18 1445          Nutrition Prescription PO    Current PO Diet Regular     Fluid Consistency Thin     Common Modifiers Cardiac;Consistent Carbohydrate;Renal             Evaluation of Received Nutrient/Fluid Intake     Row Name 09/06/18 1445 09/06/18 1442       Calculation Measurements    Weight Used For Calculations  -- 122 kg (270 lb)       PO Evaluation    Number of Days PO Intake Evaluated 3 days  --    Number of Meals 4  --    % PO Intake 100%  --            Evaluation of Prescribed Nutrient/Fluid Intake     Row Name 09/06/18 1442          Calculation Measurements    Weight Used For Calculations 122 kg (270 lb)           Electronically signed by:  Damaris Perez RD  09/06/18 2:50 PM

## 2018-09-07 VITALS
SYSTOLIC BLOOD PRESSURE: 153 MMHG | WEIGHT: 273.4 LBS | BODY MASS INDEX: 46.67 KG/M2 | OXYGEN SATURATION: 97 % | HEART RATE: 81 BPM | TEMPERATURE: 95.6 F | DIASTOLIC BLOOD PRESSURE: 68 MMHG | HEIGHT: 64 IN | RESPIRATION RATE: 18 BRPM

## 2018-09-07 LAB
ALBUMIN SERPL-MCNC: 3 G/DL (ref 3.4–4.8)
ALBUMIN/GLOB SERPL: 1 G/DL (ref 1.1–1.8)
ALP SERPL-CCNC: 71 U/L (ref 38–126)
ALT SERPL W P-5'-P-CCNC: 23 U/L (ref 9–52)
ANION GAP SERPL CALCULATED.3IONS-SCNC: 5 MMOL/L (ref 5–15)
AST SERPL-CCNC: 17 U/L (ref 14–36)
BASOPHILS # BLD AUTO: 0.03 10*3/MM3 (ref 0–0.2)
BASOPHILS NFR BLD AUTO: 0.4 % (ref 0–2)
BILIRUB SERPL-MCNC: 0.2 MG/DL (ref 0.2–1.3)
BUN BLD-MCNC: 20 MG/DL (ref 7–21)
BUN/CREAT SERPL: 20.2 (ref 7–25)
CALCIUM SPEC-SCNC: 8.4 MG/DL (ref 8.4–10.2)
CHLORIDE SERPL-SCNC: 108 MMOL/L (ref 95–110)
CO2 SERPL-SCNC: 26 MMOL/L (ref 22–31)
CREAT BLD-MCNC: 0.99 MG/DL (ref 0.5–1)
DEPRECATED RDW RBC AUTO: 56.9 FL (ref 36.4–46.3)
EOSINOPHIL # BLD AUTO: 0.8 10*3/MM3 (ref 0–0.7)
EOSINOPHIL NFR BLD AUTO: 10.2 % (ref 0–7)
ERYTHROCYTE [DISTWIDTH] IN BLOOD BY AUTOMATED COUNT: 18.9 % (ref 11.5–14.5)
GFR SERPL CREATININE-BSD FRML MDRD: 55 ML/MIN/1.73 (ref 39–90)
GLOBULIN UR ELPH-MCNC: 2.9 GM/DL (ref 2.3–3.5)
GLUCOSE BLD-MCNC: 96 MG/DL (ref 60–100)
HCT VFR BLD AUTO: 28.1 % (ref 35–45)
HGB BLD-MCNC: 8.3 G/DL (ref 12–15.5)
IMM GRANULOCYTES # BLD: 0.01 10*3/MM3 (ref 0–0.02)
IMM GRANULOCYTES NFR BLD: 0.1 % (ref 0–0.5)
LYMPHOCYTES # BLD AUTO: 2.34 10*3/MM3 (ref 0.6–4.2)
LYMPHOCYTES NFR BLD AUTO: 29.9 % (ref 10–50)
MCH RBC QN AUTO: 24.4 PG (ref 26.5–34)
MCHC RBC AUTO-ENTMCNC: 29.5 G/DL (ref 31.4–36)
MCV RBC AUTO: 82.6 FL (ref 80–98)
MONOCYTES # BLD AUTO: 0.76 10*3/MM3 (ref 0–0.9)
MONOCYTES NFR BLD AUTO: 9.7 % (ref 0–12)
NEUTROPHILS # BLD AUTO: 3.89 10*3/MM3 (ref 2–8.6)
NEUTROPHILS NFR BLD AUTO: 49.7 % (ref 37–80)
PLATELET # BLD AUTO: 276 10*3/MM3 (ref 150–450)
PMV BLD AUTO: 9 FL (ref 8–12)
POTASSIUM BLD-SCNC: 3.9 MMOL/L (ref 3.5–5.1)
PROT SERPL-MCNC: 5.9 G/DL (ref 6.3–8.6)
RBC # BLD AUTO: 3.4 10*6/MM3 (ref 3.77–5.16)
SODIUM BLD-SCNC: 139 MMOL/L (ref 137–145)
WBC NRBC COR # BLD: 7.83 10*3/MM3 (ref 3.2–9.8)

## 2018-09-07 PROCEDURE — 85025 COMPLETE CBC W/AUTO DIFF WBC: CPT | Performed by: NURSE PRACTITIONER

## 2018-09-07 PROCEDURE — 80053 COMPREHEN METABOLIC PANEL: CPT | Performed by: NURSE PRACTITIONER

## 2018-09-07 RX ORDER — CLINDAMYCIN HYDROCHLORIDE 300 MG/1
300 CAPSULE ORAL 3 TIMES DAILY
Qty: 21 CAPSULE | Refills: 0 | Status: SHIPPED | OUTPATIENT
Start: 2018-09-07 | End: 2018-09-14

## 2018-09-07 RX ORDER — BUMETANIDE 1 MG/1
1 TABLET ORAL DAILY
Status: DISCONTINUED | OUTPATIENT
Start: 2018-09-07 | End: 2018-09-07 | Stop reason: HOSPADM

## 2018-09-07 RX ADMIN — POTASSIUM CHLORIDE 10 MEQ: 750 CAPSULE, EXTENDED RELEASE ORAL at 10:17

## 2018-09-07 RX ADMIN — ISOSORBIDE MONONITRATE 30 MG: 30 TABLET, EXTENDED RELEASE ORAL at 06:04

## 2018-09-07 RX ADMIN — POLYETHYLENE GLYCOL 3350 17 G: 17 POWDER, FOR SOLUTION ORAL at 10:17

## 2018-09-07 RX ADMIN — ATORVASTATIN CALCIUM 10 MG: 10 TABLET, FILM COATED ORAL at 10:17

## 2018-09-07 RX ADMIN — DILTIAZEM HYDROCHLORIDE 120 MG: 120 CAPSULE, COATED, EXTENDED RELEASE ORAL at 10:17

## 2018-09-07 RX ADMIN — METOPROLOL TARTRATE 25 MG: 25 TABLET ORAL at 10:17

## 2018-09-07 RX ADMIN — PANTOPRAZOLE SODIUM 40 MG: 40 TABLET, DELAYED RELEASE ORAL at 06:04

## 2018-09-07 RX ADMIN — CLINDAMYCIN HYDROCHLORIDE 450 MG: 150 CAPSULE ORAL at 14:07

## 2018-09-07 RX ADMIN — SUCRALFATE 1 G: 1 TABLET ORAL at 10:17

## 2018-09-07 RX ADMIN — DULOXETINE HYDROCHLORIDE 60 MG: 60 CAPSULE, DELAYED RELEASE ORAL at 06:05

## 2018-09-07 RX ADMIN — ASPIRIN 81 MG 81 MG: 81 TABLET ORAL at 06:05

## 2018-09-07 RX ADMIN — DOCUSATE SODIUM 100 MG: 100 CAPSULE, LIQUID FILLED ORAL at 10:17

## 2018-09-07 RX ADMIN — HYDROCODONE BITARTRATE AND ACETAMINOPHEN 1 TABLET: 10; 325 TABLET ORAL at 00:06

## 2018-09-07 RX ADMIN — CLINDAMYCIN HYDROCHLORIDE 450 MG: 150 CAPSULE ORAL at 06:04

## 2018-09-07 RX ADMIN — BUMETANIDE 1 MG: 1 TABLET ORAL at 11:25

## 2018-09-07 NOTE — DISCHARGE SUMMARY
[facility] Medicine Services  DISCHARGE SUMMARY       Date of Admission: 9/1/2018  Date of Discharge:  9/7/2018  Primary Care Physician: Provider, No Known    Presenting Problem/History of Present Illness:  AKBAR (acute kidney injury) (CMS/formerly [facility]) [N17.9]  Cellulitis of lower extremity, unspecified laterality [L03.119]  Cellulitis of lower extremity, unspecified laterality [L03.119]     Final Discharge Diagnoses:  Principal Problem:    Cellulitis of lower extremity  Active Problems:    Morbid obesity due to excess calories (CMS/formerly [facility])    Hyperlipidemia    Essential hypertension    AKBAR (acute kidney injury) (CMS/formerly [facility])      Consults:   Consults     Date and Time Order Name Status Description    9/5/2018 0918 Inpatient Nephrology Consult Completed     9/1/2018 2042 Hospitalist (on-call MD unless specified)            Pertinent Test Results:   Us Venous Doppler Lower Extremity Bilateral (duplex)    Result Date: 9/1/2018  . EXAMINATION:  Ultrasound, venous, lower extremity CLINICAL INDICATION / HISTORY:   calf tenderness  COMPARISON:  none TECHNIQUE:  Bilateral lower extremity(ies)                         serial axial graded compression technique                         grayscale, color flow, spectral and Doppler FINDINGS: Imaged vessels:    - common femoral vein (CFV)    - deep femoral vein (FV) (formally called superficial femoral vein (SFV))    - profunda femoral vein (PFV) (cephalad portion)    - popliteal vein (PV)   - posterior tibial vein (PTV)     - greater saphenous vein (GSV) (non-contiguous segments)    Unless otherwise indicated, all of the above described vessels were freely compressible and demonstrated spontaneous and phasic flow as well as appropriate flow with augmentation.    .      CONCLUSION:  1. Negative examination - no evidence of deep venous thrombosis within the femoral-popliteal system of the bilateral lower extremity(ies).          Electronically signed by:  ISAI  "Eber Lawrence MD  9/1/2018 7:09 PM CDT Workstation: [id number]      HPI/Hospital Course:  The patient is a 77 y.o. female with a history of HTN, HLD, chronic lower extremity edema, and morbid obesity who presented to [facility] with worsening bilateral lower extremity swelling, erythema, and weeping.  She was treated outpatient with doxycycline for this but did not improve.  She was admitted and started on IV vancomycin.  Wound culture grew MRSA. Wound care consult was obtained.  She was recommended unnaboots and coban and legs were wrapped.  Creatinine was elevated on admission to 1.32 from a baseline of 0.8-1 and increased to 1.68 on during hospitalization.  Vancomycin was discontinued along with any nephrotoxins and diuretics.  She was deescalated to PO clindamycin by sensitivities.  Nephrology consulted and agreed with holding nephrotoxins and diuretics and providing IV fluid hydration.  Creatinine improved to her baseline.  She was recommended to resume her diuretics and lisinopril at discharge and was cleared by nephrology.  Unnaboots were changed and home health will follow-up for changes on Tuesdays and Fridays.  She is stable and discharged to home with home health services.    Condition on Discharge:  Stable    Physical Exam on Discharge:  /68   Pulse 81   Temp 95.6 °F (35.3 °C)   Resp 18   Ht 162.6 cm (64\")   Wt 124 kg (273 lb 6.4 oz)   SpO2 97%   BMI 46.93 kg/m²   Physical Exam  Constitutional: She is oriented to person, place, and time. She appears well-developed and well-nourished.   HENT:   Head: Normocephalic.   Eyes: Conjunctivae are normal.   Cardiovascular: Normal rate, regular rhythm, normal heart sounds and intact distal pulses.    Pulmonary/Chest: Effort normal and breath sounds normal.   Abdominal: Soft. Bowel sounds are normal. She exhibits no distension. There is no tenderness.   Musculoskeletal: She exhibits edema (lower extremity). She exhibits no deformity. "   Neurological: She is alert and oriented to person, place, and time.   Skin: Skin is warm and dry. She is not diaphoretic.   unnaboots bilaterally to below the knees   Vitals reviewed.    Discharge Disposition:  [facility]-Health [facility] [place]    Discharge Medications:     Discharge Medications      New Medications      Instructions Start Date   clindamycin 300 MG capsule  Commonly known as:  CLEOCIN   300 mg, Oral, 3 Times Daily         Changes to Medications      Instructions Start Date   pantoprazole 40 MG EC tablet  Commonly known as:  PROTONIX  What changed:  when to take this   40 mg, Oral, Daily         Continue These Medications      Instructions Start Date   albuterol 108 (90 Base) MCG/ACT inhaler  Commonly known as:  VENTOLIN HFA   2 puffs every 4 hours as needed for breathing      aspirin 81 MG chewable tablet   81 mg, Oral, Every Morning      bumetanide 1 MG tablet  Commonly known as:  BUMEX   1 tablet, Oral, Every Morning      Cholecalciferol 1000 units tablet   1,000 Units, Oral, Every Morning      DULoxetine 60 MG capsule  Commonly known as:  CYMBALTA   60 mg, Oral, Every Morning      gabapentin 100 MG capsule  Commonly known as:  NEURONTIN   200 mg, Oral, Nightly      HYDROcodone-acetaminophen  MG per tablet  Commonly known as:  NORCO   1 tablet, Oral, Every 12 Hours PRN      hydrOXYzine 10 MG tablet  Commonly known as:  ATARAX   10 mg, Oral, 3 Times Daily PRN      isosorbide mononitrate 30 MG 24 hr tablet  Commonly known as:  IMDUR   30 mg, Oral, Every Morning      latanoprost 0.005 % ophthalmic solution  Commonly known as:  XALATAN   1 drop, Both Eyes, Nightly      lisinopril 20 MG tablet  Commonly known as:  PRINIVIL,ZESTRIL   40 mg, Oral, Every Morning      meclizine 12.5 MG tablet  Commonly known as:  ANTIVERT   12.5 mg, Oral, Every 12 Hours PRN      metoprolol tartrate 25 MG tablet  Commonly known as:  LOPRESSOR   25 mg, Oral, 2 Times Daily      potassium chloride 10 MEQ CR tablet  Commonly known  as:  K-DUR   10 mEq, Oral, Nightly      pramipexole 0.25 MG tablet  Commonly known as:  MIRAPEX   0.25 mg, Oral, Nightly      pravastatin 20 MG tablet  Commonly known as:  PRAVACHOL   20 mg, Oral, Nightly      sucralfate 1 g tablet  Commonly known as:  CARAFATE   1 g, Oral, 2 Times Daily      TAZTIA  MG 24 hr capsule  Generic drug:  diltiaZEM   360 mg, Oral, Nightly      tiZANidine 4 MG tablet  Commonly known as:  ZANAFLEX   2 mg, Oral, 2 Times Daily PRN      traZODone 100 MG tablet  Commonly known as:  DESYREL   200 mg, Oral, Nightly         Stop These Medications    doxycycline 100 MG capsule  Commonly known as:  VIBRAMYCIN     hydrochlorothiazide 25 MG tablet  Commonly known as:  HYDRODIURIL            Discharge Diet:   Diet Instructions     Diet: Regular, Cardiac, Consistent Carbohydrate; Thin       Discharge Diet:   Regular  Cardiac  Consistent Carbohydrate       Fluid Consistency:  Thin          Activity at Discharge:   Activity Instructions     Activity as Tolerated             Follow-up Appointments:   1. PCP 1 week  2. Dr Harris, 2 weeks  Future Appointments  Date Time Provider Department Center   9/18/2018 8:15 AM Filomena Hunt APRN [facility] None   10/23/2018 1:00 PM Tatiana Connor APRN [facility] None       Test Results Pending at Discharge:  Order Current Status    Iron Profile In process          MARLENI Keane  09/07/18  3:47 PM    Time: Discharge planning and teaching took greater than 30 minutes.

## 2018-09-07 NOTE — PLAN OF CARE
Problem: Skin and Soft Tissue Infection (Adult)  Goal: Signs and Symptoms of Listed Potential Problems Will be Absent, Minimized or Managed (Skin and Soft Tissue Infection)  Outcome: Ongoing (interventions implemented as appropriate)      Problem: Skin Injury Risk (Adult)  Goal: Skin Health and Integrity  Outcome: Ongoing (interventions implemented as appropriate)      Problem: Patient Care Overview  Goal: Plan of Care Review  Outcome: Ongoing (interventions implemented as appropriate)    Goal: Individualization and Mutuality  Outcome: Ongoing (interventions implemented as appropriate)    Goal: Discharge Needs Assessment  Outcome: Ongoing (interventions implemented as appropriate)    Goal: Interprofessional Rounds/Family Conf  Outcome: Ongoing (interventions implemented as appropriate)

## 2018-09-07 NOTE — SIGNIFICANT NOTE
09/07/18 1241   Rehab Treatment   Discipline occupational therapist     Discharge orders are in the computer this am on 9/7/18. OTR/L went by and spoke with pt, Pt was eating lunch. When asked the pt if she had any questions or concerns about OT or d/c home pt voiced no concerns or questions. Pt only stated she was waiting for wound RN to come before she can go home. OTR/L spoke with RN who stated pt has been fine with mobility in room. No OT eval completed at this point.

## 2018-09-07 NOTE — PROGRESS NOTES
"[facility]  [address] [place] [address]. [address]  T - [phone]  F - [phone]     Progress Note          PATIENT  DEMOGRAPHICS   PATIENT NAME: Stephania Judd                      PHYSICIAN: MARLENI Rincon  : 1940  MRN: [id number]   LOS: 5 days    Patient Care Team:  Provider, No Known as PCP - General  Dorian Baird APRN as PCP - Claims Attributed  Subjective   SUBJECTIVE   Feels well, no marked dyspnea         Objective   OBJECTIVE   Vital Signs  Temp:  [97.1 °F (36.2 °C)-98.6 °F (37 °C)] 98.6 °F (37 °C)  Heart Rate:  [72-85] 83  Resp:  [18] 18  BP: (140-160)/(63-66) 160/63    Flowsheet Rows      First Filed Value   Admission Height  162.6 cm (64\") Documented at 2018   Admission Weight  122 kg (268 lb 6 oz) Documented at 2018 175           I/O last 3 completed shifts:  In: 840 [P.O.:840]  Out: 700 [Urine:700]    PHYSICAL EXAM    Physical Exam   Constitutional: She is oriented to person, place, and time. She appears well-developed.   HENT:   Head: Normocephalic.   Eyes: Pupils are equal, round, and reactive to light.   Cardiovascular: Normal rate, regular rhythm and normal heart sounds.    Pulmonary/Chest: Effort normal and breath sounds normal.   Abdominal: Soft. Bowel sounds are normal.   Musculoskeletal:   Jacquie boots in place   Neurological: She is alert and oriented to person, place, and time.       RESULTS   Results Review:      Results from last 7 days  Lab Units 18  0523 18  0527 18  0553   SODIUM mmol/L 139 141 139   POTASSIUM mmol/L 3.9 3.8 3.6   CHLORIDE mmol/L 108 106 102   CO2 mmol/L 26.0 27.0 26.0   BUN mg/dL 20 32* 37*   CREATININE mg/dL 0.99 1.14* 1.68*   CALCIUM mg/dL 8.4 8.5 8.7   BILIRUBIN mg/dL 0.2 0.2 0.3   ALK PHOS U/L 71 72 89   ALT (SGPT) U/L 23 21 23   AST (SGOT) U/L 17 20 18   GLUCOSE mg/dL 96 105* 105*       Estimated Creatinine Clearance: 63.9 mL/min (by C-G formula based on SCr of 0.99 " mg/dL).                  Results from last 7 days  Lab Units 09/07/18  0523 09/06/18  0527 09/05/18  0553 09/04/18  0540 09/02/18  0526   WBC 10*3/mm3 7.83 8.25 10.04* 9.28 8.39   HEMOGLOBIN g/dL 8.3* 8.1* 9.1* 9.2* 8.0*   PLATELETS 10*3/mm3 276 278 350 321 290               Imaging Results (last 24 hours)     ** No results found for the last 24 hours. **           MEDICATIONS      aspirin 81 mg Oral QAM   atorvastatin 10 mg Oral Daily   clindamycin 450 mg Oral Q8H   cyanocobalamin 1,000 mcg Intramuscular Q28 Days   diltiaZEM  mg Oral Q24H   docusate sodium 100 mg Oral BID   DULoxetine 60 mg Oral QAM   enoxaparin 40 mg Subcutaneous Q24H   gabapentin 200 mg Oral Nightly   isosorbide mononitrate 30 mg Oral QAM   latanoprost 1 drop Both Eyes Nightly   metoprolol tartrate 25 mg Oral Q12H   pantoprazole 40 mg Oral Q AM   polyethylene glycol 17 g Oral Daily   potassium chloride 10 mEq Oral Daily   pramipexole 0.25 mg Oral Nightly   sucralfate 1 g Oral BID AC   traZODone 200 mg Oral Nightly       sodium chloride 50 mL/hr Last Rate: 50 mL/hr (09/06/18 1732)       Assessment/Plan   ASSESSMENT / PLAN    Principal Problem:    Cellulitis of lower extremity  Active Problems:    Morbid obesity due to excess calories (CMS/[facility])    Hyperlipidemia    Essential hypertension    AKBAR (acute kidney injury) (CMS/[facility])       1. AKBAR - baseline creatinine 0.8-1  - AKBAR present on admission began to improve with gentle fluid resuscitation. Current AKBAR is likely due to vancomycin. She received 2 doses, last dose 9/4. Now on clinda  - Agree with holding Bumex, lisinopril, and HCTZ for now.   - Stop IVF, Cr at baseline. Girish negative. Will resume bumex 1mg QD     2. CHF  - holding diuretics and lisinopril as above  - She does have a history of hospitalization for CHF exacerbation and volume overload.  - Wt is up 5 lbs since admission, will resume bumex 1mg QD     3. HTN  - currently normotensive, continue to monitor as we are holding  diuretics and lisinopril    4. HLD           This document has been electronically signed by MARLENI Rincon on September 7, 2018 10:02 AM

## 2018-09-07 NOTE — PLAN OF CARE
Problem: Skin and Soft Tissue Infection (Adult)  Goal: Signs and Symptoms of Listed Potential Problems Will be Absent, Minimized or Managed (Skin and Soft Tissue Infection)  Outcome: Ongoing (interventions implemented as appropriate)      Problem: Skin Injury Risk (Adult)  Goal: Skin Health and Integrity  Outcome: Ongoing (interventions implemented as appropriate)      Problem: Patient Care Overview  Goal: Plan of Care Review  Outcome: Ongoing (interventions implemented as appropriate)   09/07/18 0405   Coping/Psychosocial   Plan of Care Reviewed With patient   Plan of Care Review   Progress improving   OTHER   Outcome Summary VSS, pain controlled PO, resting throughout the night, continue to monitor     Goal: Individualization and Mutuality  Outcome: Ongoing (interventions implemented as appropriate)

## 2018-09-07 NOTE — THERAPY DISCHARGE NOTE
Acute Care - Physical Therapy Discharge Summary  [facility]       Patient Name: Stephania Judd  : 1940  MRN: [id number]    Today's Date: 2018  Onset of Illness/Injury or Date of Surgery: 18    Date of Referral to PT: 18  Referring Physician: MARLENI Olguin      Admit Date: 2018      PT Recommendation and Plan    Visit Dx:    ICD-10-CM ICD-9-CM   1. Cellulitis of lower extremity, unspecified laterality L03.119 682.6   2. AKBAR (acute kidney injury) (CMS/[facility]) N17.9 584.9   3. Impaired functional mobility, balance, gait, and endurance Z74.09 V49.89   4. Morbid obesity due to excess calories (CMS/[facility]) E66.01 278.01             Outcome Measures     Row Name 18 0830 18 0951          How much help from another person do you currently need...    Turning from your back to your side while in flat bed without using bedrails? 3  -DAI 3  -JCA     Moving from lying on back to sitting on the side of a flat bed without bedrails? 3  -DAI 3  -JCA     Moving to and from a bed to a chair (including a wheelchair)? 3  -DAI 3  -JCA     Standing up from a chair using your arms (e.g., wheelchair, bedside chair)? 3  -DAI 3  -JCA     Climbing 3-5 steps with a railing? 3  -DAI 3  -JCA     To walk in hospital room? 3  -DAI 3  -JCA     AM-PAC 6 Clicks Score 18  -DAI 18  -JCA        Functional Assessment    Outcome Measure Options AM-PAC 6 Clicks Basic Mobility (PT)  -DAI AM-PAC 6 Clicks Basic Mobility (PT)  -JCA       User Key  (r) = Recorded By, (t) = Taken By, (c) = Cosigned By    Initials Name Provider Type    Lisa Ruiz, PT Physical Therapist    Abram Duran, PTA Physical Therapy Assistant            Therapy Suggested Charges     Code   Minutes Charges    None                     PT Discharge Summary  Anticipated Discharge Disposition (PT): home with assist, home with home health  Reason for Discharge: Discharge from facility, Per MD order  Outcomes Achieved: Refer to plan of care  for updates on goals achieved  Discharge Destination: Home with home health      Gloria Fontana, PT   9/7/2018

## 2018-09-07 NOTE — NURSING NOTE
Old dressings removed from both legs. Legs washed soap and water  Dried. Unnaboots and coban applied to both legs. Legs wrapped from toes to bed of knee. Will need changing 09-. I talked to Kev YEPEZ and arrangement with home health for dressing changes will be made.

## 2018-09-08 ENCOUNTER — READMISSION MANAGEMENT (OUTPATIENT)
Dept: CALL CENTER | Facility: HOSPITAL | Age: 78
End: 2018-09-08

## 2018-09-08 NOTE — OUTREACH NOTE
Prep Survey      Responses   Facility patient discharged from?  [facility]   Is patient eligible?  Yes   Discharge diagnosis  Cellulitis of lower extremity, morbid obesity due to excess calories, hyperlipidemia, essentila HTN, AKBAR   Does the patient have one of the following disease processes/diagnoses(primary or secondary)?  Other   Does the patient have Home health ordered?  Yes   What is the Home health agency?   [facility]   Is there a DME ordered?  No   Comments regarding appointments  See AVS   Prep survey completed?  Yes          Lawanda Krishnamurthy RN

## 2018-09-10 NOTE — PAYOR COMM NOTE
"Stephania Judd (77 y.o. Female)     Date of Birth Social Security Number Address Home Phone MRN    1940  [address] [place] [id number] [phone] [id number]    Faith Marital Status          Zoroastrianism        Admission Date Admission Type Admitting Provider Attending Provider Department, Room/Bed    9/1/18 Emergency Peterson Douglass MD  [facility] 3 EAST, 357/1    Discharge Date Discharge Disposition Discharge Destination        9/7/2018 Home-Health Care [facility] [place]              Attending Provider:  (none)   Allergies:  Morphine And Related    Isolation:  Contact   Infection:  MRSA (09/03/18)   Code Status:  Prior    Ht:  162.6 cm (64\")   Wt:  124 kg (273 lb 6.4 oz)    Admission Cmt:  None   Principal Problem:  Cellulitis of lower extremity [L03.119]                 Active Insurance as of 9/1/2018     Primary Coverage     Payor Plan Insurance Group Employer/Plan Group    MISC MEDICARE REPLACMENT [facility] [id number]     Coverage Address Coverage Phone Number Effective From Effective To    [address] [phone] 1/1/2017     [place] [id number]       Subscriber Name Subscriber Birth Date Member ID       STEPHANIA JUDD [id number]                 Emergency Contacts      (Rel.) Home Phone Work Phone Mobile Phone    Mehrdad Chaves (Son) [phone] -- [phone]               Discharge Summary      Kev Castillo APRN at 9/7/2018  3:01 PM              [facility]  DISCHARGE SUMMARY       Date of Admission: 9/1/2018  Date of Discharge:  9/7/2018  Primary Care Physician: Provider, No Known    Presenting Problem/History of Present Illness:  AKBAR (acute kidney injury) (CMS/HCC) [N17.9]  Cellulitis of lower extremity, unspecified laterality [L03.119]  Cellulitis of lower extremity, unspecified laterality [L03.119]     Final Discharge Diagnoses:  Principal Problem:    " Cellulitis of lower extremity  Active Problems:    Morbid obesity due to excess calories (CMS/[facility])    Hyperlipidemia    Essential hypertension    AKBAR (acute kidney injury) (CMS/[facility])      Consults:   Consults     Date and Time Order Name Status Description    9/5/2018 0918 Inpatient Nephrology Consult Completed     9/1/2018 2042 Hospitalist (on-call MD unless specified)            Pertinent Test Results:   Us Venous Doppler Lower Extremity Bilateral (duplex)    Result Date: 9/1/2018  . EXAMINATION:  Ultrasound, venous, lower extremity CLINICAL INDICATION / HISTORY:   calf tenderness  COMPARISON:  none TECHNIQUE:  Bilateral lower extremity(ies)                         serial axial graded compression technique                         grayscale, color flow, spectral and Doppler FINDINGS: Imaged vessels:    - common femoral vein (CFV)    - deep femoral vein (FV) (formally called superficial femoral vein (SFV))    - profunda femoral vein (PFV) (cephalad portion)    - popliteal vein (PV)   - posterior tibial vein (PTV)     - greater saphenous vein (GSV) (non-contiguous segments)    Unless otherwise indicated, all of the above described vessels were freely compressible and demonstrated spontaneous and phasic flow as well as appropriate flow with augmentation.    .      CONCLUSION:  1. Negative examination - no evidence of deep venous thrombosis within the femoral-popliteal system of the bilateral lower extremity(ies).          Electronically signed by:  ISAI Lawrence MD  9/1/2018 7:09 PM CDT Workstation: [id number]      HPI/Hospital Course:  The patient is a 77 y.o. female with a history of HTN, HLD, chronic lower extremity edema, and morbid obesity who presented to [facility] with worsening bilateral lower extremity swelling, erythema, and weeping.  She was treated outpatient with doxycycline for this but did not improve.  She was admitted and started on IV vancomycin.  Wound culture grew MRSA. Wound  "care consult was obtained.  She was recommended unnaboots and coban and legs were wrapped.  Creatinine was elevated on admission to 1.32 from a baseline of 0.8-1 and increased to 1.68 on during hospitalization.  Vancomycin was discontinued along with any nephrotoxins and diuretics.  She was deescalated to PO clindamycin by sensitivities.  Nephrology consulted and agreed with holding nephrotoxins and diuretics and providing IV fluid hydration.  Creatinine improved to her baseline.  She was recommended to resume her diuretics and lisinopril at discharge and was cleared by nephrology.  Unnaboots were changed and home health will follow-up for changes on Tuesdays and Fridays.  She is stable and discharged to home with home health services.    Condition on Discharge:  Stable    Physical Exam on Discharge:  /68   Pulse 81   Temp 95.6 °F (35.3 °C)   Resp 18   Ht 162.6 cm (64\")   Wt 124 kg (273 lb 6.4 oz)   SpO2 97%   BMI 46.93 kg/m²    Physical Exam  Constitutional: She is oriented to person, place, and time. She appears well-developed and well-nourished.   HENT:   Head: Normocephalic.   Eyes: Conjunctivae are normal.   Cardiovascular: Normal rate, regular rhythm, normal heart sounds and intact distal pulses.    Pulmonary/Chest: Effort normal and breath sounds normal.   Abdominal: Soft. Bowel sounds are normal. She exhibits no distension. There is no tenderness.   Musculoskeletal: She exhibits edema (lower extremity). She exhibits no deformity.   Neurological: She is alert and oriented to person, place, and time.   Skin: Skin is warm and dry. She is not diaphoretic.   unnaboots bilaterally to below the knees   Vitals reviewed.    Discharge Disposition:  Home-Health Care [facility] [place]    Discharge Medications:     Discharge Medications      New Medications      Instructions Start Date   clindamycin 300 MG capsule  Commonly known as:  CLEOCIN   300 mg, Oral, 3 Times Daily         Changes to Medications      Instructions " Start Date   pantoprazole 40 MG EC tablet  Commonly known as:  PROTONIX  What changed:  when to take this   40 mg, Oral, Daily         Continue These Medications      Instructions Start Date   albuterol 108 (90 Base) MCG/ACT inhaler  Commonly known as:  VENTOLIN HFA   2 puffs every 4 hours as needed for breathing      aspirin 81 MG chewable tablet   81 mg, Oral, Every Morning      bumetanide 1 MG tablet  Commonly known as:  BUMEX   1 tablet, Oral, Every Morning      Cholecalciferol 1000 units tablet   1,000 Units, Oral, Every Morning      DULoxetine 60 MG capsule  Commonly known as:  CYMBALTA   60 mg, Oral, Every Morning      gabapentin 100 MG capsule  Commonly known as:  NEURONTIN   200 mg, Oral, Nightly      HYDROcodone-acetaminophen  MG per tablet  Commonly known as:  NORCO   1 tablet, Oral, Every 12 Hours PRN      hydrOXYzine 10 MG tablet  Commonly known as:  ATARAX   10 mg, Oral, 3 Times Daily PRN      isosorbide mononitrate 30 MG 24 hr tablet  Commonly known as:  IMDUR   30 mg, Oral, Every Morning      latanoprost 0.005 % ophthalmic solution  Commonly known as:  XALATAN   1 drop, Both Eyes, Nightly      lisinopril 20 MG tablet  Commonly known as:  PRINIVIL,ZESTRIL   40 mg, Oral, Every Morning      meclizine 12.5 MG tablet  Commonly known as:  ANTIVERT   12.5 mg, Oral, Every 12 Hours PRN      metoprolol tartrate 25 MG tablet  Commonly known as:  LOPRESSOR   25 mg, Oral, 2 Times Daily      potassium chloride 10 MEQ CR tablet  Commonly known as:  K-DUR   10 mEq, Oral, Nightly      pramipexole 0.25 MG tablet  Commonly known as:  MIRAPEX   0.25 mg, Oral, Nightly      pravastatin 20 MG tablet  Commonly known as:  PRAVACHOL   20 mg, Oral, Nightly      sucralfate 1 g tablet  Commonly known as:  CARAFATE   1 g, Oral, 2 Times Daily      TAZTIA  MG 24 hr capsule  Generic drug:  diltiaZEM   360 mg, Oral, Nightly      tiZANidine 4 MG tablet  Commonly known as:  ZANAFLEX   2 mg, Oral, 2 Times Daily PRN       traZODone 100 MG tablet  Commonly known as:  DESYREL   200 mg, Oral, Nightly         Stop These Medications    doxycycline 100 MG capsule  Commonly known as:  VIBRAMYCIN     hydrochlorothiazide 25 MG tablet  Commonly known as:  HYDRODIURIL            Discharge Diet:   Diet Instructions     Diet: Regular, Cardiac, Consistent Carbohydrate; Thin       Discharge Diet:   Regular  Cardiac  Consistent Carbohydrate       Fluid Consistency:  Thin          Activity at Discharge:   Activity Instructions     Activity as Tolerated             Follow-up Appointments:   1. PCP 1 week  2. Dr Harris, 2 weeks  Future Appointments  Date Time Provider Department Center   9/18/2018 8:15 AM Filomena Hunt APRN [facility] None   10/23/2018 1:00 PM Tatiana Connor APRN [facility] None       Test Results Pending at Discharge:  Order Current Status    Iron Profile In process          MARLENI Keane  09/07/18  3:47 PM    Time: Discharge planning and teaching took greater than 30 minutes.                 Electronically signed by Kev Castillo APRN at 9/7/2018  3:53 PM

## 2018-09-12 ENCOUNTER — READMISSION MANAGEMENT (OUTPATIENT)
Dept: CALL CENTER | Facility: HOSPITAL | Age: 78
End: 2018-09-12

## 2018-09-12 NOTE — OUTREACH NOTE
Medical Week 1 Survey      Responses   Facility patient discharged from?  [place]   Does the patient have one of the following disease processes/diagnoses(primary or secondary)?  Other   Is there a successful TCM telephone encounter documented?  No   Week 1 attempt successful?  Yes   Call start time  1037   Call end time  1043   Discharge diagnosis  Cellulitis of lower extremity, morbid obesity due to excess calories, hyperlipidemia, essentila HTN, AKBAR   Person spoke with today (if not patient) and relationship  son, Mehrdad   Meds reviewed with patient/caregiver?  Yes   Is the patient having any side effects they believe may be caused by any medication additions or changes?  No   Does the patient have all medications ordered at discharge?  Yes   Is the patient taking all medications as directed (includes completed medication regime)?  Yes   Comments regarding appointments  has f/u appt on 9/13/18   Does the patient have a primary care provider?   Yes   Does the patient have an appointment with their PCP within 7 days of discharge?  Yes   Has the patient kept scheduled appointments due by today?  N/A   What is the Home health agency?   [facility]   Has home health visited the patient within 72 hours of discharge?  Yes   Home health comments   PT has been making visits   Comments  pt's son, Mehrdad, states his wife is an APRN, and helps manage pt's care, pt had unna boot dsg placed on leg at d/c. and will be changed at appt on 9/13   Did the patient receive a copy of their discharge instructions?  Yes   Nursing interventions  Reviewed instructions with patient   What is the patient's perception of their health status since discharge?  Improving   Is the patient/caregiver able to teach back signs and symptoms related to disease process for when to call PCP?  Yes   Is the patient/caregiver able to teach back signs and symptoms related to disease process for when to call 911?  Yes   Is the patient/caregiver able to teach  back the hierarchy of who to call/visit for symptoms/problems? PCP, Specialist, Home health nurse, Urgent Care, ED, 911  Yes   Week 1 call completed?  Yes          Sandi Mcnally RN

## 2018-09-21 ENCOUNTER — READMISSION MANAGEMENT (OUTPATIENT)
Dept: CALL CENTER | Facility: HOSPITAL | Age: 78
End: 2018-09-21

## 2018-09-21 NOTE — OUTREACH NOTE
Medical Week 2 Survey      Responses   Facility patient discharged from?  [place]   Does the patient have one of the following disease processes/diagnoses(primary or secondary)?  Other   Week 2 attempt successful?  Yes   Call start time  1049   Discharge diagnosis  Cellulitis of lower extremity, morbid obesity due to excess calories, hyperlipidemia, essentila HTN, AKBAR   Call end time  1052   Is patient permission given to speak with other caregiver?  Yes   Meds reviewed with patient/caregiver?  Yes   Is the patient having any side effects they believe may be caused by any medication additions or changes?  No   Does the patient have all medications ordered at discharge?  Yes   Is the patient taking all medications as directed (includes completed medication regime)?  Yes   Has the patient kept scheduled appointments due by today?  Yes   Comments  Home Health coming   Has home health visited the patient within 72 hours of discharge?  Yes   Nursing interventions  Educated on MyChart   What is the patient's perception of their health status since discharge?  Improving   Week 2 Call Completed?  Yes          Elisa Christensen RN

## 2018-09-28 ENCOUNTER — LAB REQUISITION (OUTPATIENT)
Dept: LAB | Facility: HOSPITAL | Age: 78
End: 2018-09-28

## 2018-09-28 DIAGNOSIS — R53.83 OTHER FATIGUE: ICD-10-CM

## 2018-09-28 DIAGNOSIS — R60.9 EDEMA: ICD-10-CM

## 2018-09-28 DIAGNOSIS — N17.9 ACUTE KIDNEY FAILURE (HCC): ICD-10-CM

## 2018-09-28 LAB
ALBUMIN SERPL-MCNC: 3.7 G/DL (ref 3.4–4.8)
ALBUMIN/GLOB SERPL: 1.2 G/DL (ref 1.1–1.8)
ALP SERPL-CCNC: 98 U/L (ref 38–126)
ALT SERPL W P-5'-P-CCNC: 18 U/L (ref 9–52)
ANION GAP SERPL CALCULATED.3IONS-SCNC: 12 MMOL/L (ref 5–15)
AST SERPL-CCNC: 17 U/L (ref 14–36)
BASOPHILS # BLD AUTO: 0.02 10*3/MM3 (ref 0–0.2)
BASOPHILS NFR BLD AUTO: 0.2 % (ref 0–2)
BILIRUB SERPL-MCNC: 0.4 MG/DL (ref 0.2–1.3)
BUN BLD-MCNC: 23 MG/DL (ref 7–21)
BUN/CREAT SERPL: 21.7 (ref 7–25)
CALCIUM SPEC-SCNC: 9.3 MG/DL (ref 8.4–10.2)
CHLORIDE SERPL-SCNC: 100 MMOL/L (ref 95–110)
CO2 SERPL-SCNC: 26 MMOL/L (ref 22–31)
CREAT BLD-MCNC: 1.06 MG/DL (ref 0.5–1)
DEPRECATED RDW RBC AUTO: 53.4 FL (ref 36.4–46.3)
EOSINOPHIL # BLD AUTO: 0.77 10*3/MM3 (ref 0–0.7)
EOSINOPHIL NFR BLD AUTO: 9 % (ref 0–7)
ERYTHROCYTE [DISTWIDTH] IN BLOOD BY AUTOMATED COUNT: 18.1 % (ref 11.5–14.5)
GFR SERPL CREATININE-BSD FRML MDRD: 51 ML/MIN/1.73 (ref 39–90)
GLOBULIN UR ELPH-MCNC: 3 GM/DL (ref 2.3–3.5)
GLUCOSE BLD-MCNC: 126 MG/DL (ref 60–100)
HCT VFR BLD AUTO: 32 % (ref 35–45)
HGB BLD-MCNC: 9.7 G/DL (ref 12–15.5)
IMM GRANULOCYTES # BLD: 0.02 10*3/MM3 (ref 0–0.02)
IMM GRANULOCYTES NFR BLD: 0.2 % (ref 0–0.5)
LYMPHOCYTES # BLD AUTO: 2.14 10*3/MM3 (ref 0.6–4.2)
LYMPHOCYTES NFR BLD AUTO: 25.1 % (ref 10–50)
MCH RBC QN AUTO: 24.3 PG (ref 26.5–34)
MCHC RBC AUTO-ENTMCNC: 30.3 G/DL (ref 31.4–36)
MCV RBC AUTO: 80 FL (ref 80–98)
MONOCYTES # BLD AUTO: 0.64 10*3/MM3 (ref 0–0.9)
MONOCYTES NFR BLD AUTO: 7.5 % (ref 0–12)
NEUTROPHILS # BLD AUTO: 4.94 10*3/MM3 (ref 2–8.6)
NEUTROPHILS NFR BLD AUTO: 58 % (ref 37–80)
PLATELET # BLD AUTO: 322 10*3/MM3 (ref 150–450)
PMV BLD AUTO: 10 FL (ref 8–12)
POTASSIUM BLD-SCNC: 4.5 MMOL/L (ref 3.5–5.1)
PROT SERPL-MCNC: 6.7 G/DL (ref 6.3–8.6)
RBC # BLD AUTO: 4 10*6/MM3 (ref 3.77–5.16)
SODIUM BLD-SCNC: 138 MMOL/L (ref 137–145)
VIT B12 BLD-MCNC: 231 PG/ML (ref 239–931)
WBC NRBC COR # BLD: 8.53 10*3/MM3 (ref 3.2–9.8)

## 2018-09-28 PROCEDURE — 82607 VITAMIN B-12: CPT | Performed by: PHYSICAL MEDICINE & REHABILITATION

## 2018-09-28 PROCEDURE — 85025 COMPLETE CBC W/AUTO DIFF WBC: CPT | Performed by: PHYSICAL MEDICINE & REHABILITATION

## 2018-09-28 PROCEDURE — 80053 COMPREHEN METABOLIC PANEL: CPT | Performed by: PHYSICAL MEDICINE & REHABILITATION

## 2018-09-30 ENCOUNTER — READMISSION MANAGEMENT (OUTPATIENT)
Dept: CALL CENTER | Facility: HOSPITAL | Age: 78
End: 2018-09-30

## 2018-09-30 NOTE — OUTREACH NOTE
Medical Week 3 Survey      Responses   Facility patient discharged from?  [place]   Does the patient have one of the following disease processes/diagnoses(primary or secondary)?  Other   Week 3 attempt successful?  Yes   Call start time  1139   Call end time  1147   List who call center can speak with  the patient    Meds reviewed with patient/caregiver?  Yes   Is the patient taking all medications as directed (includes completed medication regime)?  Yes   Comments regarding appointments  has an appointment with the kidney doctor on Wed, and MD to YOU on Tuesday   Has the patient kept scheduled appointments due by today?  N/A   Comments  has betsy boots and home health changes twice a week   What is the patient's perception of their health status since discharge?  Improving   Week 3 Call Completed?  Yes   Wrap up additional comments  daughter in law has got the pharmacy to place her medication in package for the day of the week          Cathleen Garza RN

## 2021-01-01 ENCOUNTER — HOSPITAL ENCOUNTER (INPATIENT)
Facility: HOSPITAL | Age: 81
LOS: 18 days | End: 2021-10-22
Attending: EMERGENCY MEDICINE | Admitting: HOSPITALIST

## 2021-01-01 ENCOUNTER — APPOINTMENT (OUTPATIENT)
Dept: CARDIOLOGY | Facility: HOSPITAL | Age: 81
End: 2021-01-01

## 2021-01-01 ENCOUNTER — APPOINTMENT (OUTPATIENT)
Dept: CT IMAGING | Facility: HOSPITAL | Age: 81
End: 2021-01-01

## 2021-01-01 ENCOUNTER — APPOINTMENT (OUTPATIENT)
Dept: GENERAL RADIOLOGY | Facility: HOSPITAL | Age: 81
End: 2021-01-01

## 2021-01-01 ENCOUNTER — APPOINTMENT (OUTPATIENT)
Dept: INTERVENTIONAL RADIOLOGY/VASCULAR | Facility: HOSPITAL | Age: 81
End: 2021-01-01

## 2021-01-01 ENCOUNTER — APPOINTMENT (OUTPATIENT)
Dept: ULTRASOUND IMAGING | Facility: HOSPITAL | Age: 81
End: 2021-01-01

## 2021-01-01 VITALS
DIASTOLIC BLOOD PRESSURE: 68 MMHG | OXYGEN SATURATION: 48 % | RESPIRATION RATE: 32 BRPM | HEART RATE: 124 BPM | WEIGHT: 265.65 LBS | SYSTOLIC BLOOD PRESSURE: 162 MMHG | TEMPERATURE: 97.6 F | BODY MASS INDEX: 45.35 KG/M2 | HEIGHT: 64 IN

## 2021-01-01 DIAGNOSIS — U07.1 PNEUMONIA DUE TO COVID-19 VIRUS: Primary | ICD-10-CM

## 2021-01-01 DIAGNOSIS — J96.01 ACUTE RESPIRATORY FAILURE WITH HYPOXIA (HCC): ICD-10-CM

## 2021-01-01 DIAGNOSIS — Z74.09 IMPAIRED FUNCTIONAL MOBILITY, BALANCE, GAIT, AND ENDURANCE: ICD-10-CM

## 2021-01-01 DIAGNOSIS — A49.9 UTI (URINARY TRACT INFECTION), BACTERIAL: ICD-10-CM

## 2021-01-01 DIAGNOSIS — Z74.09 IMPAIRED MOBILITY AND ADLS: ICD-10-CM

## 2021-01-01 DIAGNOSIS — N39.0 UTI (URINARY TRACT INFECTION), BACTERIAL: ICD-10-CM

## 2021-01-01 DIAGNOSIS — Z78.9 IMPAIRED MOBILITY AND ADLS: ICD-10-CM

## 2021-01-01 DIAGNOSIS — J12.82 PNEUMONIA DUE TO COVID-19 VIRUS: Primary | ICD-10-CM

## 2021-01-01 DIAGNOSIS — R07.9 CHEST PAIN, UNSPECIFIED TYPE: ICD-10-CM

## 2021-01-01 DIAGNOSIS — I16.0 HYPERTENSIVE URGENCY: ICD-10-CM

## 2021-01-01 LAB
ALBUMIN SERPL-MCNC: 2.6 G/DL (ref 3.5–5.2)
ALBUMIN SERPL-MCNC: 2.6 G/DL (ref 3.5–5.2)
ALBUMIN SERPL-MCNC: 2.9 G/DL (ref 3.5–5.2)
ALBUMIN SERPL-MCNC: 3 G/DL (ref 3.5–5.2)
ALBUMIN SERPL-MCNC: 3.1 G/DL (ref 3.5–5.2)
ALBUMIN SERPL-MCNC: 3.2 G/DL (ref 3.5–5.2)
ALBUMIN SERPL-MCNC: 3.3 G/DL (ref 3.5–5.2)
ALBUMIN SERPL-MCNC: 3.3 G/DL (ref 3.5–5.2)
ALBUMIN SERPL-MCNC: 3.4 G/DL (ref 3.5–5.2)
ALBUMIN SERPL-MCNC: 3.5 G/DL (ref 3.5–5.2)
ALBUMIN SERPL-MCNC: 3.8 G/DL (ref 3.5–5.2)
ALBUMIN/GLOB SERPL: 0.8 G/DL
ALBUMIN/GLOB SERPL: 0.9 G/DL
ALBUMIN/GLOB SERPL: 1 G/DL
ALBUMIN/GLOB SERPL: 1 G/DL
ALBUMIN/GLOB SERPL: 1.1 G/DL
ALBUMIN/GLOB SERPL: 1.1 G/DL
ALP SERPL-CCNC: 62 U/L (ref 39–117)
ALP SERPL-CCNC: 64 U/L (ref 39–117)
ALP SERPL-CCNC: 68 U/L (ref 39–117)
ALP SERPL-CCNC: 69 U/L (ref 39–117)
ALP SERPL-CCNC: 71 U/L (ref 39–117)
ALP SERPL-CCNC: 74 U/L (ref 39–117)
ALP SERPL-CCNC: 74 U/L (ref 39–117)
ALP SERPL-CCNC: 78 U/L (ref 39–117)
ALP SERPL-CCNC: 89 U/L (ref 39–117)
ALP SERPL-CCNC: 91 U/L (ref 39–117)
ALP SERPL-CCNC: 92 U/L (ref 39–117)
ALT SERPL W P-5'-P-CCNC: 122 U/L (ref 1–33)
ALT SERPL W P-5'-P-CCNC: 22 U/L (ref 1–33)
ALT SERPL W P-5'-P-CCNC: 22 U/L (ref 1–33)
ALT SERPL W P-5'-P-CCNC: 27 U/L (ref 1–33)
ALT SERPL W P-5'-P-CCNC: 30 U/L (ref 1–33)
ALT SERPL W P-5'-P-CCNC: 30 U/L (ref 1–33)
ALT SERPL W P-5'-P-CCNC: 51 U/L (ref 1–33)
ALT SERPL W P-5'-P-CCNC: 52 U/L (ref 1–33)
ALT SERPL W P-5'-P-CCNC: 57 U/L (ref 1–33)
ALT SERPL W P-5'-P-CCNC: 80 U/L (ref 1–33)
ALT SERPL W P-5'-P-CCNC: 80 U/L (ref 1–33)
ANION GAP SERPL CALCULATED.3IONS-SCNC: 10 MMOL/L (ref 5–15)
ANION GAP SERPL CALCULATED.3IONS-SCNC: 11 MMOL/L (ref 5–15)
ANION GAP SERPL CALCULATED.3IONS-SCNC: 11 MMOL/L (ref 5–15)
ANION GAP SERPL CALCULATED.3IONS-SCNC: 12 MMOL/L (ref 5–15)
ANION GAP SERPL CALCULATED.3IONS-SCNC: 13 MMOL/L (ref 5–15)
ANION GAP SERPL CALCULATED.3IONS-SCNC: 13 MMOL/L (ref 5–15)
ANION GAP SERPL CALCULATED.3IONS-SCNC: 14 MMOL/L (ref 5–15)
ANION GAP SERPL CALCULATED.3IONS-SCNC: 15 MMOL/L (ref 5–15)
ANION GAP SERPL CALCULATED.3IONS-SCNC: 9 MMOL/L (ref 5–15)
ANISOCYTOSIS BLD QL: ABNORMAL
APTT PPP: 31.5 SECONDS (ref 20–40.3)
ARTERIAL PATENCY WRIST A: ABNORMAL
ARTERIAL PATENCY WRIST A: POSITIVE
ARTERIAL PATENCY WRIST A: POSITIVE
AST SERPL-CCNC: 16 U/L (ref 1–32)
AST SERPL-CCNC: 19 U/L (ref 1–32)
AST SERPL-CCNC: 22 U/L (ref 1–32)
AST SERPL-CCNC: 34 U/L (ref 1–32)
AST SERPL-CCNC: 38 U/L (ref 1–32)
AST SERPL-CCNC: 40 U/L (ref 1–32)
AST SERPL-CCNC: 50 U/L (ref 1–32)
AST SERPL-CCNC: 54 U/L (ref 1–32)
AST SERPL-CCNC: 78 U/L (ref 1–32)
ATMOSPHERIC PRESS: 741 MMHG
ATMOSPHERIC PRESS: 746 MMHG
ATMOSPHERIC PRESS: 747 MMHG
ATMOSPHERIC PRESS: 748 MMHG
ATMOSPHERIC PRESS: 748 MMHG
ATMOSPHERIC PRESS: 753 MMHG
BACTERIA SPEC AEROBE CULT: ABNORMAL
BACTERIA SPEC AEROBE CULT: NORMAL
BACTERIA SPEC AEROBE CULT: NORMAL
BACTERIA SPEC RESP CULT: NO GROWTH
BACTERIA UR QL AUTO: ABNORMAL /HPF
BACTERIA UR QL AUTO: ABNORMAL /HPF
BASE EXCESS BLDA CALC-SCNC: 3.4 MMOL/L (ref 0–2)
BASE EXCESS BLDA CALC-SCNC: 5.2 MMOL/L (ref 0–2)
BASE EXCESS BLDA CALC-SCNC: 5.3 MMOL/L (ref 0–2)
BASE EXCESS BLDA CALC-SCNC: 5.3 MMOL/L (ref 0–2)
BASE EXCESS BLDA CALC-SCNC: 7.7 MMOL/L (ref 0–2)
BASE EXCESS BLDA CALC-SCNC: 7.9 MMOL/L (ref 0–2)
BASOPHILS # BLD AUTO: 0 10*3/MM3 (ref 0–0.2)
BASOPHILS # BLD AUTO: 0.01 10*3/MM3 (ref 0–0.2)
BASOPHILS # BLD AUTO: 0.02 10*3/MM3 (ref 0–0.2)
BASOPHILS # BLD AUTO: 0.03 10*3/MM3 (ref 0–0.2)
BASOPHILS # BLD AUTO: 0.03 10*3/MM3 (ref 0–0.2)
BASOPHILS # BLD AUTO: 0.04 10*3/MM3 (ref 0–0.2)
BASOPHILS # BLD AUTO: 0.05 10*3/MM3 (ref 0–0.2)
BASOPHILS NFR BLD AUTO: 0 % (ref 0–1.5)
BASOPHILS NFR BLD AUTO: 0.1 % (ref 0–1.5)
BASOPHILS NFR BLD AUTO: 0.2 % (ref 0–1.5)
BASOPHILS NFR BLD AUTO: 0.3 % (ref 0–1.5)
BDY SITE: ABNORMAL
BH CV ECHO MEAS - BSA(HAYCOCK): 2.5 M^2
BH CV ECHO MEAS - BSA: 2.3 M^2
BH CV ECHO MEAS - BZI_BMI: 49.3 KILOGRAMS/M^2
BH CV ECHO MEAS - BZI_METRIC_HEIGHT: 162.6 CM
BH CV ECHO MEAS - BZI_METRIC_WEIGHT: 130.2 KG
BH CV ECHO MEAS - EDV(MOD-SP4): 45 ML
BH CV ECHO MEAS - EF(MOD-SP4): 76.2 %
BH CV ECHO MEAS - ESV(MOD-SP4): 10.7 ML
BH CV ECHO MEAS - LV DIASTOLIC VOL/BSA (35-75): 19.7 ML/M^2
BH CV ECHO MEAS - LV SYSTOLIC VOL/BSA (12-30): 4.7 ML/M^2
BH CV ECHO MEAS - LVLD AP4: 7.1 CM
BH CV ECHO MEAS - LVLS AP4: 5.8 CM
BH CV ECHO MEAS - SI(MOD-SP4): 15 ML/M^2
BH CV ECHO MEAS - SV(MOD-SP4): 34.3 ML
BILIRUB SERPL-MCNC: 0.3 MG/DL (ref 0–1.2)
BILIRUB SERPL-MCNC: 0.3 MG/DL (ref 0–1.2)
BILIRUB SERPL-MCNC: 0.4 MG/DL (ref 0–1.2)
BILIRUB SERPL-MCNC: 0.5 MG/DL (ref 0–1.2)
BILIRUB SERPL-MCNC: 0.6 MG/DL (ref 0–1.2)
BILIRUB UR QL STRIP: NEGATIVE
BILIRUB UR QL STRIP: NEGATIVE
BUN SERPL-MCNC: 15 MG/DL (ref 8–23)
BUN SERPL-MCNC: 16 MG/DL (ref 8–23)
BUN SERPL-MCNC: 16 MG/DL (ref 8–23)
BUN SERPL-MCNC: 18 MG/DL (ref 8–23)
BUN SERPL-MCNC: 19 MG/DL (ref 8–23)
BUN SERPL-MCNC: 21 MG/DL (ref 8–23)
BUN SERPL-MCNC: 22 MG/DL (ref 8–23)
BUN SERPL-MCNC: 28 MG/DL (ref 8–23)
BUN SERPL-MCNC: 28 MG/DL (ref 8–23)
BUN SERPL-MCNC: 49 MG/DL (ref 8–23)
BUN SERPL-MCNC: 68 MG/DL (ref 8–23)
BUN SERPL-MCNC: 69 MG/DL (ref 8–23)
BUN SERPL-MCNC: 70 MG/DL (ref 8–23)
BUN SERPL-MCNC: 77 MG/DL (ref 8–23)
BUN SERPL-MCNC: 79 MG/DL (ref 8–23)
BUN SERPL-MCNC: 81 MG/DL (ref 8–23)
BUN SERPL-MCNC: 85 MG/DL (ref 8–23)
BUN/CREAT SERPL: 17 (ref 7–25)
BUN/CREAT SERPL: 17.2 (ref 7–25)
BUN/CREAT SERPL: 17.8 (ref 7–25)
BUN/CREAT SERPL: 18.5 (ref 7–25)
BUN/CREAT SERPL: 24.1 (ref 7–25)
BUN/CREAT SERPL: 28 (ref 7–25)
BUN/CREAT SERPL: 31.9 (ref 7–25)
BUN/CREAT SERPL: 39.4 (ref 7–25)
BUN/CREAT SERPL: 41.5 (ref 7–25)
BUN/CREAT SERPL: 41.8 (ref 7–25)
BUN/CREAT SERPL: 55.6 (ref 7–25)
BUN/CREAT SERPL: 61.8 (ref 7–25)
BUN/CREAT SERPL: 69.3 (ref 7–25)
BUN/CREAT SERPL: 72.6 (ref 7–25)
BUN/CREAT SERPL: 73.6 (ref 7–25)
BUN/CREAT SERPL: 75.2 (ref 7–25)
BUN/CREAT SERPL: 84.2 (ref 7–25)
CALCIUM SPEC-SCNC: 8.3 MG/DL (ref 8.6–10.5)
CALCIUM SPEC-SCNC: 8.3 MG/DL (ref 8.6–10.5)
CALCIUM SPEC-SCNC: 8.6 MG/DL (ref 8.6–10.5)
CALCIUM SPEC-SCNC: 8.6 MG/DL (ref 8.6–10.5)
CALCIUM SPEC-SCNC: 8.7 MG/DL (ref 8.6–10.5)
CALCIUM SPEC-SCNC: 9 MG/DL (ref 8.6–10.5)
CALCIUM SPEC-SCNC: 9.1 MG/DL (ref 8.6–10.5)
CALCIUM SPEC-SCNC: 9.1 MG/DL (ref 8.6–10.5)
CALCIUM SPEC-SCNC: 9.2 MG/DL (ref 8.6–10.5)
CALCIUM SPEC-SCNC: 9.3 MG/DL (ref 8.6–10.5)
CALCIUM SPEC-SCNC: 9.4 MG/DL (ref 8.6–10.5)
CALCIUM SPEC-SCNC: 9.6 MG/DL (ref 8.6–10.5)
CALCIUM SPEC-SCNC: 9.7 MG/DL (ref 8.6–10.5)
CHLORIDE SERPL-SCNC: 100 MMOL/L (ref 98–107)
CHLORIDE SERPL-SCNC: 101 MMOL/L (ref 98–107)
CHLORIDE SERPL-SCNC: 103 MMOL/L (ref 98–107)
CHLORIDE SERPL-SCNC: 104 MMOL/L (ref 98–107)
CHLORIDE SERPL-SCNC: 104 MMOL/L (ref 98–107)
CHLORIDE SERPL-SCNC: 105 MMOL/L (ref 98–107)
CHLORIDE SERPL-SCNC: 106 MMOL/L (ref 98–107)
CHLORIDE SERPL-SCNC: 107 MMOL/L (ref 98–107)
CHLORIDE SERPL-SCNC: 95 MMOL/L (ref 98–107)
CHLORIDE SERPL-SCNC: 96 MMOL/L (ref 98–107)
CHLORIDE SERPL-SCNC: 97 MMOL/L (ref 98–107)
CHLORIDE SERPL-SCNC: 99 MMOL/L (ref 98–107)
CLARITY UR: ABNORMAL
CLARITY UR: CLEAR
CLUMPED PLATELETS: PRESENT
CO2 SERPL-SCNC: 20 MMOL/L (ref 22–29)
CO2 SERPL-SCNC: 22 MMOL/L (ref 22–29)
CO2 SERPL-SCNC: 23 MMOL/L (ref 22–29)
CO2 SERPL-SCNC: 26 MMOL/L (ref 22–29)
CO2 SERPL-SCNC: 27 MMOL/L (ref 22–29)
CO2 SERPL-SCNC: 28 MMOL/L (ref 22–29)
CO2 SERPL-SCNC: 29 MMOL/L (ref 22–29)
COLOR UR: YELLOW
COLOR UR: YELLOW
CREAT SERPL-MCNC: 0.67 MG/DL (ref 0.57–1)
CREAT SERPL-MCNC: 0.69 MG/DL (ref 0.57–1)
CREAT SERPL-MCNC: 0.71 MG/DL (ref 0.57–1)
CREAT SERPL-MCNC: 0.75 MG/DL (ref 0.57–1)
CREAT SERPL-MCNC: 0.79 MG/DL (ref 0.57–1)
CREAT SERPL-MCNC: 0.81 MG/DL (ref 0.57–1)
CREAT SERPL-MCNC: 0.9 MG/DL (ref 0.57–1)
CREAT SERPL-MCNC: 0.93 MG/DL (ref 0.57–1)
CREAT SERPL-MCNC: 1.01 MG/DL (ref 0.57–1)
CREAT SERPL-MCNC: 1.01 MG/DL (ref 0.57–1)
CREAT SERPL-MCNC: 1.05 MG/DL (ref 0.57–1)
CREAT SERPL-MCNC: 1.06 MG/DL (ref 0.57–1)
CREAT SERPL-MCNC: 1.06 MG/DL (ref 0.57–1)
CREAT SERPL-MCNC: 1.1 MG/DL (ref 0.57–1)
CREAT SERPL-MCNC: 1.1 MG/DL (ref 0.57–1)
CREAT SERPL-MCNC: 1.18 MG/DL (ref 0.57–1)
CREAT SERPL-MCNC: 1.24 MG/DL (ref 0.57–1)
CRP SERPL-MCNC: 2.32 MG/DL (ref 0–0.5)
CRP SERPL-MCNC: 2.84 MG/DL (ref 0–0.5)
CRP SERPL-MCNC: 3.99 MG/DL (ref 0–0.5)
CRP SERPL-MCNC: 4.6 MG/DL (ref 0–0.5)
CRP SERPL-MCNC: 5.75 MG/DL (ref 0–0.5)
CRP SERPL-MCNC: 6.12 MG/DL (ref 0–0.5)
CRP SERPL-MCNC: 7.58 MG/DL (ref 0–0.5)
CRP SERPL-MCNC: 7.66 MG/DL (ref 0–0.5)
CRP SERPL-MCNC: 8.61 MG/DL (ref 0–0.5)
CRP SERPL-MCNC: 9.38 MG/DL (ref 0–0.5)
CRP SERPL-MCNC: 9.41 MG/DL (ref 0–0.5)
D-DIMER, QUANTITATIVE (MAD,POW, STR): 1225 NG/ML (FEU) (ref 0–470)
D-DIMER, QUANTITATIVE (MAD,POW, STR): 1307 NG/ML (FEU) (ref 0–470)
D-DIMER, QUANTITATIVE (MAD,POW, STR): 2183 NG/ML (FEU) (ref 0–470)
D-DIMER, QUANTITATIVE (MAD,POW, STR): 2594 NG/ML (FEU) (ref 0–470)
D-DIMER, QUANTITATIVE (MAD,POW, STR): 325 NG/ML (FEU) (ref 0–470)
D-DIMER, QUANTITATIVE (MAD,POW, STR): 621 NG/ML (FEU) (ref 0–470)
D-DIMER, QUANTITATIVE (MAD,POW, STR): 727 NG/ML (FEU) (ref 0–470)
D-DIMER, QUANTITATIVE (MAD,POW, STR): 738 NG/ML (FEU) (ref 0–470)
D-DIMER, QUANTITATIVE (MAD,POW, STR): 833 NG/ML (FEU) (ref 0–470)
D-DIMER, QUANTITATIVE (MAD,POW, STR): 854 NG/ML (FEU) (ref 0–470)
D-DIMER, QUANTITATIVE (MAD,POW, STR): 945 NG/ML (FEU) (ref 0–470)
D-LACTATE SERPL-SCNC: 0.8 MMOL/L (ref 0.5–2)
D-LACTATE SERPL-SCNC: 1 MMOL/L (ref 0.5–2)
D-LACTATE SERPL-SCNC: 1.7 MMOL/L (ref 0.5–2)
DEPRECATED RDW RBC AUTO: 42.6 FL (ref 37–54)
DEPRECATED RDW RBC AUTO: 42.6 FL (ref 37–54)
DEPRECATED RDW RBC AUTO: 43.3 FL (ref 37–54)
DEPRECATED RDW RBC AUTO: 43.5 FL (ref 37–54)
DEPRECATED RDW RBC AUTO: 43.7 FL (ref 37–54)
DEPRECATED RDW RBC AUTO: 44.2 FL (ref 37–54)
DEPRECATED RDW RBC AUTO: 44.9 FL (ref 37–54)
DEPRECATED RDW RBC AUTO: 44.9 FL (ref 37–54)
DEPRECATED RDW RBC AUTO: 45.4 FL (ref 37–54)
DEPRECATED RDW RBC AUTO: 45.5 FL (ref 37–54)
DEPRECATED RDW RBC AUTO: 45.7 FL (ref 37–54)
DEPRECATED RDW RBC AUTO: 45.7 FL (ref 37–54)
DEPRECATED RDW RBC AUTO: 45.8 FL (ref 37–54)
DEPRECATED RDW RBC AUTO: 45.8 FL (ref 37–54)
DEPRECATED RDW RBC AUTO: 46.1 FL (ref 37–54)
DEPRECATED RDW RBC AUTO: 47.5 FL (ref 37–54)
DEPRECATED RDW RBC AUTO: 47.6 FL (ref 37–54)
DEPRECATED RDW RBC AUTO: 47.8 FL (ref 37–54)
EOSINOPHIL # BLD AUTO: 0 10*3/MM3 (ref 0–0.4)
EOSINOPHIL # BLD AUTO: 0.01 10*3/MM3 (ref 0–0.4)
EOSINOPHIL # BLD AUTO: 0.02 10*3/MM3 (ref 0–0.4)
EOSINOPHIL # BLD AUTO: 0.03 10*3/MM3 (ref 0–0.4)
EOSINOPHIL NFR BLD AUTO: 0 % (ref 0.3–6.2)
EOSINOPHIL NFR BLD AUTO: 0.1 % (ref 0.3–6.2)
EOSINOPHIL NFR BLD AUTO: 0.2 % (ref 0.3–6.2)
EOSINOPHIL NFR BLD AUTO: 0.3 % (ref 0.3–6.2)
ERYTHROCYTE [DISTWIDTH] IN BLOOD BY AUTOMATED COUNT: 12.8 % (ref 12.3–15.4)
ERYTHROCYTE [DISTWIDTH] IN BLOOD BY AUTOMATED COUNT: 12.8 % (ref 12.3–15.4)
ERYTHROCYTE [DISTWIDTH] IN BLOOD BY AUTOMATED COUNT: 12.9 % (ref 12.3–15.4)
ERYTHROCYTE [DISTWIDTH] IN BLOOD BY AUTOMATED COUNT: 12.9 % (ref 12.3–15.4)
ERYTHROCYTE [DISTWIDTH] IN BLOOD BY AUTOMATED COUNT: 13 % (ref 12.3–15.4)
ERYTHROCYTE [DISTWIDTH] IN BLOOD BY AUTOMATED COUNT: 13.1 % (ref 12.3–15.4)
ERYTHROCYTE [DISTWIDTH] IN BLOOD BY AUTOMATED COUNT: 13.2 % (ref 12.3–15.4)
ERYTHROCYTE [DISTWIDTH] IN BLOOD BY AUTOMATED COUNT: 13.3 % (ref 12.3–15.4)
ERYTHROCYTE [DISTWIDTH] IN BLOOD BY AUTOMATED COUNT: 13.4 % (ref 12.3–15.4)
ERYTHROCYTE [DISTWIDTH] IN BLOOD BY AUTOMATED COUNT: 13.5 % (ref 12.3–15.4)
ERYTHROCYTE [DISTWIDTH] IN BLOOD BY AUTOMATED COUNT: 13.5 % (ref 12.3–15.4)
ERYTHROCYTE [DISTWIDTH] IN BLOOD BY AUTOMATED COUNT: 13.6 % (ref 12.3–15.4)
ERYTHROCYTE [DISTWIDTH] IN BLOOD BY AUTOMATED COUNT: 13.6 % (ref 12.3–15.4)
ERYTHROCYTE [DISTWIDTH] IN BLOOD BY AUTOMATED COUNT: 13.7 % (ref 12.3–15.4)
ERYTHROCYTE [SEDIMENTATION RATE] IN BLOOD: 19 MM/HR (ref 0–20)
ERYTHROCYTE [SEDIMENTATION RATE] IN BLOOD: 24 MM/HR (ref 0–20)
ERYTHROCYTE [SEDIMENTATION RATE] IN BLOOD: 8 MM/HR (ref 0–20)
FERRITIN SERPL-MCNC: 1035 NG/ML (ref 13–150)
FERRITIN SERPL-MCNC: 1124 NG/ML (ref 13–150)
FERRITIN SERPL-MCNC: 1193 NG/ML (ref 13–150)
FERRITIN SERPL-MCNC: 1584 NG/ML (ref 13–150)
FERRITIN SERPL-MCNC: 882.7 NG/ML (ref 13–150)
FERRITIN SERPL-MCNC: 993.8 NG/ML (ref 13–150)
FLUAV SUBTYP SPEC NAA+PROBE: NOT DETECTED
FLUBV RNA ISLT QL NAA+PROBE: NOT DETECTED
GAS FLOW AIRWAY: 2 LPM
GFR SERPL CREATININE-BSD FRML MDRD: 42 ML/MIN/1.73
GFR SERPL CREATININE-BSD FRML MDRD: 44 ML/MIN/1.73
GFR SERPL CREATININE-BSD FRML MDRD: 48 ML/MIN/1.73
GFR SERPL CREATININE-BSD FRML MDRD: 48 ML/MIN/1.73
GFR SERPL CREATININE-BSD FRML MDRD: 50 ML/MIN/1.73
GFR SERPL CREATININE-BSD FRML MDRD: 50 ML/MIN/1.73
GFR SERPL CREATININE-BSD FRML MDRD: 51 ML/MIN/1.73
GFR SERPL CREATININE-BSD FRML MDRD: 53 ML/MIN/1.73
GFR SERPL CREATININE-BSD FRML MDRD: 53 ML/MIN/1.73
GFR SERPL CREATININE-BSD FRML MDRD: 58 ML/MIN/1.73
GFR SERPL CREATININE-BSD FRML MDRD: 61 ML/MIN/1.73
GFR SERPL CREATININE-BSD FRML MDRD: 68 ML/MIN/1.73
GFR SERPL CREATININE-BSD FRML MDRD: 70 ML/MIN/1.73
GFR SERPL CREATININE-BSD FRML MDRD: 75 ML/MIN/1.73
GFR SERPL CREATININE-BSD FRML MDRD: 80 ML/MIN/1.73
GFR SERPL CREATININE-BSD FRML MDRD: 82 ML/MIN/1.73
GFR SERPL CREATININE-BSD FRML MDRD: 85 ML/MIN/1.73
GLOBULIN UR ELPH-MCNC: 3.1 GM/DL
GLOBULIN UR ELPH-MCNC: 3.1 GM/DL
GLOBULIN UR ELPH-MCNC: 3.2 GM/DL
GLOBULIN UR ELPH-MCNC: 3.4 GM/DL
GLOBULIN UR ELPH-MCNC: 3.6 GM/DL
GLOBULIN UR ELPH-MCNC: 3.6 GM/DL
GLOBULIN UR ELPH-MCNC: 3.8 GM/DL
GLUCOSE BLDC GLUCOMTR-MCNC: 100 MG/DL (ref 70–130)
GLUCOSE BLDC GLUCOMTR-MCNC: 100 MG/DL (ref 70–130)
GLUCOSE BLDC GLUCOMTR-MCNC: 102 MG/DL (ref 70–130)
GLUCOSE BLDC GLUCOMTR-MCNC: 104 MG/DL (ref 70–130)
GLUCOSE BLDC GLUCOMTR-MCNC: 105 MG/DL (ref 70–130)
GLUCOSE BLDC GLUCOMTR-MCNC: 105 MG/DL (ref 70–130)
GLUCOSE BLDC GLUCOMTR-MCNC: 112 MG/DL (ref 70–130)
GLUCOSE BLDC GLUCOMTR-MCNC: 113 MG/DL (ref 70–130)
GLUCOSE BLDC GLUCOMTR-MCNC: 114 MG/DL (ref 70–130)
GLUCOSE BLDC GLUCOMTR-MCNC: 114 MG/DL (ref 70–130)
GLUCOSE BLDC GLUCOMTR-MCNC: 116 MG/DL (ref 70–130)
GLUCOSE BLDC GLUCOMTR-MCNC: 127 MG/DL (ref 70–130)
GLUCOSE BLDC GLUCOMTR-MCNC: 127 MG/DL (ref 70–130)
GLUCOSE BLDC GLUCOMTR-MCNC: 131 MG/DL (ref 70–130)
GLUCOSE BLDC GLUCOMTR-MCNC: 141 MG/DL (ref 70–130)
GLUCOSE BLDC GLUCOMTR-MCNC: 147 MG/DL (ref 70–130)
GLUCOSE BLDC GLUCOMTR-MCNC: 150 MG/DL (ref 70–130)
GLUCOSE BLDC GLUCOMTR-MCNC: 181 MG/DL (ref 70–130)
GLUCOSE BLDC GLUCOMTR-MCNC: 192 MG/DL (ref 70–130)
GLUCOSE BLDC GLUCOMTR-MCNC: 241 MG/DL (ref 70–130)
GLUCOSE BLDC GLUCOMTR-MCNC: 83 MG/DL (ref 70–130)
GLUCOSE BLDC GLUCOMTR-MCNC: 83 MG/DL (ref 70–130)
GLUCOSE BLDC GLUCOMTR-MCNC: 97 MG/DL (ref 70–130)
GLUCOSE BLDC GLUCOMTR-MCNC: 98 MG/DL (ref 70–130)
GLUCOSE SERPL-MCNC: 114 MG/DL (ref 65–99)
GLUCOSE SERPL-MCNC: 124 MG/DL (ref 65–99)
GLUCOSE SERPL-MCNC: 141 MG/DL (ref 65–99)
GLUCOSE SERPL-MCNC: 143 MG/DL (ref 65–99)
GLUCOSE SERPL-MCNC: 144 MG/DL (ref 65–99)
GLUCOSE SERPL-MCNC: 152 MG/DL (ref 65–99)
GLUCOSE SERPL-MCNC: 158 MG/DL (ref 65–99)
GLUCOSE SERPL-MCNC: 162 MG/DL (ref 65–99)
GLUCOSE SERPL-MCNC: 171 MG/DL (ref 65–99)
GLUCOSE SERPL-MCNC: 175 MG/DL (ref 65–99)
GLUCOSE SERPL-MCNC: 179 MG/DL (ref 65–99)
GLUCOSE SERPL-MCNC: 188 MG/DL (ref 65–99)
GLUCOSE SERPL-MCNC: 200 MG/DL (ref 65–99)
GLUCOSE SERPL-MCNC: 83 MG/DL (ref 65–99)
GLUCOSE SERPL-MCNC: 87 MG/DL (ref 65–99)
GLUCOSE SERPL-MCNC: 96 MG/DL (ref 65–99)
GLUCOSE SERPL-MCNC: 98 MG/DL (ref 65–99)
GLUCOSE UR STRIP-MCNC: NEGATIVE MG/DL
GLUCOSE UR STRIP-MCNC: NEGATIVE MG/DL
GRAM STN SPEC: NORMAL
HCO3 BLDA-SCNC: 28.3 MMOL/L (ref 20–26)
HCO3 BLDA-SCNC: 28.4 MMOL/L (ref 20–26)
HCO3 BLDA-SCNC: 30.9 MMOL/L (ref 20–26)
HCO3 BLDA-SCNC: 31 MMOL/L (ref 20–26)
HCO3 BLDA-SCNC: 31.4 MMOL/L (ref 20–26)
HCO3 BLDA-SCNC: 32.4 MMOL/L (ref 20–26)
HCT VFR BLD AUTO: 42.8 % (ref 34–46.6)
HCT VFR BLD AUTO: 43.1 % (ref 34–46.6)
HCT VFR BLD AUTO: 43.1 % (ref 34–46.6)
HCT VFR BLD AUTO: 43.3 % (ref 34–46.6)
HCT VFR BLD AUTO: 43.9 % (ref 34–46.6)
HCT VFR BLD AUTO: 44 % (ref 34–46.6)
HCT VFR BLD AUTO: 44.4 % (ref 34–46.6)
HCT VFR BLD AUTO: 46 % (ref 34–46.6)
HCT VFR BLD AUTO: 46 % (ref 34–46.6)
HCT VFR BLD AUTO: 46.2 % (ref 34–46.6)
HCT VFR BLD AUTO: 46.2 % (ref 34–46.6)
HCT VFR BLD AUTO: 47.1 % (ref 34–46.6)
HCT VFR BLD AUTO: 47.1 % (ref 34–46.6)
HCT VFR BLD AUTO: 47.2 % (ref 34–46.6)
HCT VFR BLD AUTO: 47.4 % (ref 34–46.6)
HCT VFR BLD AUTO: 48.5 % (ref 34–46.6)
HCT VFR BLD AUTO: 48.7 % (ref 34–46.6)
HCT VFR BLD AUTO: 49.8 % (ref 34–46.6)
HGB BLD-MCNC: 13.8 G/DL (ref 12–15.9)
HGB BLD-MCNC: 14 G/DL (ref 12–15.9)
HGB BLD-MCNC: 14.2 G/DL (ref 12–15.9)
HGB BLD-MCNC: 14.3 G/DL (ref 12–15.9)
HGB BLD-MCNC: 14.3 G/DL (ref 12–15.9)
HGB BLD-MCNC: 14.4 G/DL (ref 12–15.9)
HGB BLD-MCNC: 14.5 G/DL (ref 12–15.9)
HGB BLD-MCNC: 15.3 G/DL (ref 12–15.9)
HGB BLD-MCNC: 15.6 G/DL (ref 12–15.9)
HGB BLD-MCNC: 15.8 G/DL (ref 12–15.9)
HGB BLD-MCNC: 15.9 G/DL (ref 12–15.9)
HGB BLD-MCNC: 16 G/DL (ref 12–15.9)
HGB BLD-MCNC: 16.1 G/DL (ref 12–15.9)
HGB BLD-MCNC: 16.2 G/DL (ref 12–15.9)
HGB BLD-MCNC: 16.8 G/DL (ref 12–15.9)
HGB UR QL STRIP.AUTO: ABNORMAL
HGB UR QL STRIP.AUTO: NEGATIVE
HOLD SPECIMEN: NORMAL
HOLD SPECIMEN: NORMAL
HYALINE CASTS UR QL AUTO: ABNORMAL /LPF
HYALINE CASTS UR QL AUTO: ABNORMAL /LPF
IMM GRANULOCYTES # BLD AUTO: 0.03 10*3/MM3 (ref 0–0.05)
IMM GRANULOCYTES # BLD AUTO: 0.04 10*3/MM3 (ref 0–0.05)
IMM GRANULOCYTES # BLD AUTO: 0.05 10*3/MM3 (ref 0–0.05)
IMM GRANULOCYTES # BLD AUTO: 0.16 10*3/MM3 (ref 0–0.05)
IMM GRANULOCYTES # BLD AUTO: 0.16 10*3/MM3 (ref 0–0.05)
IMM GRANULOCYTES # BLD AUTO: 0.17 10*3/MM3 (ref 0–0.05)
IMM GRANULOCYTES # BLD AUTO: 0.18 10*3/MM3 (ref 0–0.05)
IMM GRANULOCYTES # BLD AUTO: 0.18 10*3/MM3 (ref 0–0.05)
IMM GRANULOCYTES # BLD AUTO: 0.2 10*3/MM3 (ref 0–0.05)
IMM GRANULOCYTES # BLD AUTO: 0.21 10*3/MM3 (ref 0–0.05)
IMM GRANULOCYTES # BLD AUTO: 0.22 10*3/MM3 (ref 0–0.05)
IMM GRANULOCYTES # BLD AUTO: 0.26 10*3/MM3 (ref 0–0.05)
IMM GRANULOCYTES # BLD AUTO: 0.26 10*3/MM3 (ref 0–0.05)
IMM GRANULOCYTES # BLD AUTO: 0.37 10*3/MM3 (ref 0–0.05)
IMM GRANULOCYTES NFR BLD AUTO: 0.5 % (ref 0–0.5)
IMM GRANULOCYTES NFR BLD AUTO: 0.6 % (ref 0–0.5)
IMM GRANULOCYTES NFR BLD AUTO: 0.6 % (ref 0–0.5)
IMM GRANULOCYTES NFR BLD AUTO: 0.7 % (ref 0–0.5)
IMM GRANULOCYTES NFR BLD AUTO: 1.3 % (ref 0–0.5)
IMM GRANULOCYTES NFR BLD AUTO: 1.6 % (ref 0–0.5)
IMM GRANULOCYTES NFR BLD AUTO: 1.7 % (ref 0–0.5)
IMM GRANULOCYTES NFR BLD AUTO: 1.7 % (ref 0–0.5)
IMM GRANULOCYTES NFR BLD AUTO: 1.8 % (ref 0–0.5)
IMM GRANULOCYTES NFR BLD AUTO: 2.1 % (ref 0–0.5)
IMM GRANULOCYTES NFR BLD AUTO: 2.1 % (ref 0–0.5)
IMM GRANULOCYTES NFR BLD AUTO: 2.2 % (ref 0–0.5)
IMM GRANULOCYTES NFR BLD AUTO: 2.6 % (ref 0–0.5)
INHALED O2 CONCENTRATION: 100 %
INHALED O2 CONCENTRATION: 30 %
INHALED O2 CONCENTRATION: 45 %
INHALED O2 CONCENTRATION: 75 %
INHALED O2 CONCENTRATION: <21 %
INR PPP: 1.06 (ref 0.8–1.2)
KETONES UR QL STRIP: ABNORMAL
KETONES UR QL STRIP: NEGATIVE
LARGE PLATELETS: NORMAL
LDH SERPL-CCNC: 248 U/L (ref 135–214)
LDH SERPL-CCNC: 281 U/L (ref 135–214)
LDH SERPL-CCNC: 292 U/L (ref 135–214)
LDH SERPL-CCNC: 292 U/L (ref 135–214)
LDH SERPL-CCNC: 301 U/L (ref 135–214)
LDH SERPL-CCNC: 322 U/L (ref 135–214)
LDH SERPL-CCNC: 360 U/L (ref 135–214)
LDH SERPL-CCNC: 371 U/L (ref 135–214)
LDH SERPL-CCNC: 413 U/L (ref 135–214)
LDH SERPL-CCNC: 483 U/L (ref 135–214)
LEUKOCYTE ESTERASE UR QL STRIP.AUTO: ABNORMAL
LEUKOCYTE ESTERASE UR QL STRIP.AUTO: NEGATIVE
LV EF 2D ECHO EST: 61 %
LYMPHOCYTES # BLD AUTO: 0.52 10*3/MM3 (ref 0.7–3.1)
LYMPHOCYTES # BLD AUTO: 0.53 10*3/MM3 (ref 0.7–3.1)
LYMPHOCYTES # BLD AUTO: 0.56 10*3/MM3 (ref 0.7–3.1)
LYMPHOCYTES # BLD AUTO: 0.63 10*3/MM3 (ref 0.7–3.1)
LYMPHOCYTES # BLD AUTO: 0.65 10*3/MM3 (ref 0.7–3.1)
LYMPHOCYTES # BLD AUTO: 0.67 10*3/MM3 (ref 0.7–3.1)
LYMPHOCYTES # BLD AUTO: 0.68 10*3/MM3 (ref 0.7–3.1)
LYMPHOCYTES # BLD AUTO: 0.73 10*3/MM3 (ref 0.7–3.1)
LYMPHOCYTES # BLD AUTO: 0.78 10*3/MM3 (ref 0.7–3.1)
LYMPHOCYTES # BLD AUTO: 0.79 10*3/MM3 (ref 0.7–3.1)
LYMPHOCYTES # BLD AUTO: 0.79 10*3/MM3 (ref 0.7–3.1)
LYMPHOCYTES # BLD AUTO: 0.8 10*3/MM3 (ref 0.7–3.1)
LYMPHOCYTES # BLD AUTO: 0.89 10*3/MM3 (ref 0.7–3.1)
LYMPHOCYTES # BLD AUTO: 0.99 10*3/MM3 (ref 0.7–3.1)
LYMPHOCYTES # BLD AUTO: 1 10*3/MM3 (ref 0.7–3.1)
LYMPHOCYTES # BLD AUTO: 1.05 10*3/MM3 (ref 0.7–3.1)
LYMPHOCYTES # BLD AUTO: 1.17 10*3/MM3 (ref 0.7–3.1)
LYMPHOCYTES # BLD MANUAL: 1.05 10*3/MM3 (ref 0.7–3.1)
LYMPHOCYTES NFR BLD AUTO: 11.4 % (ref 19.6–45.3)
LYMPHOCYTES NFR BLD AUTO: 13.4 % (ref 19.6–45.3)
LYMPHOCYTES NFR BLD AUTO: 16.2 % (ref 19.6–45.3)
LYMPHOCYTES NFR BLD AUTO: 16.8 % (ref 19.6–45.3)
LYMPHOCYTES NFR BLD AUTO: 19.4 % (ref 19.6–45.3)
LYMPHOCYTES NFR BLD AUTO: 24.7 % (ref 19.6–45.3)
LYMPHOCYTES NFR BLD AUTO: 4.2 % (ref 19.6–45.3)
LYMPHOCYTES NFR BLD AUTO: 5 % (ref 19.6–45.3)
LYMPHOCYTES NFR BLD AUTO: 5.6 % (ref 19.6–45.3)
LYMPHOCYTES NFR BLD AUTO: 5.9 % (ref 19.6–45.3)
LYMPHOCYTES NFR BLD AUTO: 6.4 % (ref 19.6–45.3)
LYMPHOCYTES NFR BLD AUTO: 6.7 % (ref 19.6–45.3)
LYMPHOCYTES NFR BLD AUTO: 6.8 % (ref 19.6–45.3)
LYMPHOCYTES NFR BLD AUTO: 7.3 % (ref 19.6–45.3)
LYMPHOCYTES NFR BLD AUTO: 7.3 % (ref 19.6–45.3)
LYMPHOCYTES NFR BLD AUTO: 7.7 % (ref 19.6–45.3)
LYMPHOCYTES NFR BLD AUTO: 8.3 % (ref 19.6–45.3)
LYMPHOCYTES NFR BLD MANUAL: 8 % (ref 5–12)
LYMPHOCYTES NFR BLD MANUAL: 9 % (ref 19.6–45.3)
Lab: ABNORMAL
MAGNESIUM SERPL-MCNC: 2.1 MG/DL (ref 1.6–2.4)
MAGNESIUM SERPL-MCNC: 2.1 MG/DL (ref 1.6–2.4)
MAGNESIUM SERPL-MCNC: 2.2 MG/DL (ref 1.6–2.4)
MAGNESIUM SERPL-MCNC: 2.3 MG/DL (ref 1.6–2.4)
MAGNESIUM SERPL-MCNC: 2.4 MG/DL (ref 1.6–2.4)
MAXIMAL PREDICTED HEART RATE: 142 BPM
MCH RBC QN AUTO: 30.3 PG (ref 26.6–33)
MCH RBC QN AUTO: 30.4 PG (ref 26.6–33)
MCH RBC QN AUTO: 30.4 PG (ref 26.6–33)
MCH RBC QN AUTO: 30.5 PG (ref 26.6–33)
MCH RBC QN AUTO: 30.6 PG (ref 26.6–33)
MCH RBC QN AUTO: 30.7 PG (ref 26.6–33)
MCH RBC QN AUTO: 30.8 PG (ref 26.6–33)
MCH RBC QN AUTO: 30.9 PG (ref 26.6–33)
MCH RBC QN AUTO: 31 PG (ref 26.6–33)
MCH RBC QN AUTO: 31.1 PG (ref 26.6–33)
MCHC RBC AUTO-ENTMCNC: 32 G/DL (ref 31.5–35.7)
MCHC RBC AUTO-ENTMCNC: 32.2 G/DL (ref 31.5–35.7)
MCHC RBC AUTO-ENTMCNC: 32.3 G/DL (ref 31.5–35.7)
MCHC RBC AUTO-ENTMCNC: 32.6 G/DL (ref 31.5–35.7)
MCHC RBC AUTO-ENTMCNC: 32.7 G/DL (ref 31.5–35.7)
MCHC RBC AUTO-ENTMCNC: 32.8 G/DL (ref 31.5–35.7)
MCHC RBC AUTO-ENTMCNC: 32.8 G/DL (ref 31.5–35.7)
MCHC RBC AUTO-ENTMCNC: 33 G/DL (ref 31.5–35.7)
MCHC RBC AUTO-ENTMCNC: 33.1 G/DL (ref 31.5–35.7)
MCHC RBC AUTO-ENTMCNC: 33.2 G/DL (ref 31.5–35.7)
MCHC RBC AUTO-ENTMCNC: 33.3 G/DL (ref 31.5–35.7)
MCHC RBC AUTO-ENTMCNC: 33.4 G/DL (ref 31.5–35.7)
MCHC RBC AUTO-ENTMCNC: 33.7 G/DL (ref 31.5–35.7)
MCHC RBC AUTO-ENTMCNC: 33.9 G/DL (ref 31.5–35.7)
MCHC RBC AUTO-ENTMCNC: 34.2 G/DL (ref 31.5–35.7)
MCHC RBC AUTO-ENTMCNC: 34.3 G/DL (ref 31.5–35.7)
MCV RBC AUTO: 90 FL (ref 79–97)
MCV RBC AUTO: 90.9 FL (ref 79–97)
MCV RBC AUTO: 90.9 FL (ref 79–97)
MCV RBC AUTO: 91.5 FL (ref 79–97)
MCV RBC AUTO: 92.2 FL (ref 79–97)
MCV RBC AUTO: 92.2 FL (ref 79–97)
MCV RBC AUTO: 92.5 FL (ref 79–97)
MCV RBC AUTO: 92.6 FL (ref 79–97)
MCV RBC AUTO: 92.6 FL (ref 79–97)
MCV RBC AUTO: 93.1 FL (ref 79–97)
MCV RBC AUTO: 93.1 FL (ref 79–97)
MCV RBC AUTO: 93.2 FL (ref 79–97)
MCV RBC AUTO: 93.2 FL (ref 79–97)
MCV RBC AUTO: 93.3 FL (ref 79–97)
MCV RBC AUTO: 93.7 FL (ref 79–97)
MCV RBC AUTO: 94.3 FL (ref 79–97)
MCV RBC AUTO: 94.9 FL (ref 79–97)
MCV RBC AUTO: 95.2 FL (ref 79–97)
MODALITY: ABNORMAL
MONOCYTES # BLD AUTO: 0.41 10*3/MM3 (ref 0.1–0.9)
MONOCYTES # BLD AUTO: 0.42 10*3/MM3 (ref 0.1–0.9)
MONOCYTES # BLD AUTO: 0.45 10*3/MM3 (ref 0.1–0.9)
MONOCYTES # BLD AUTO: 0.45 10*3/MM3 (ref 0.1–0.9)
MONOCYTES # BLD AUTO: 0.47 10*3/MM3 (ref 0.1–0.9)
MONOCYTES # BLD AUTO: 0.47 10*3/MM3 (ref 0.1–0.9)
MONOCYTES # BLD AUTO: 0.55 10*3/MM3 (ref 0.1–0.9)
MONOCYTES # BLD AUTO: 0.57 10*3/MM3 (ref 0.1–0.9)
MONOCYTES # BLD AUTO: 0.57 10*3/MM3 (ref 0.1–0.9)
MONOCYTES # BLD AUTO: 0.59 10*3/MM3 (ref 0.1–0.9)
MONOCYTES # BLD AUTO: 0.63 10*3/MM3 (ref 0.1–0.9)
MONOCYTES # BLD AUTO: 0.65 10*3/MM3 (ref 0.1–0.9)
MONOCYTES # BLD AUTO: 0.78 10*3/MM3 (ref 0.1–0.9)
MONOCYTES # BLD AUTO: 0.92 10*3/MM3 (ref 0.1–0.9)
MONOCYTES # BLD AUTO: 0.93 10*3/MM3 (ref 0.1–0.9)
MONOCYTES # BLD AUTO: 0.96 10*3/MM3 (ref 0.1–0.9)
MONOCYTES # BLD AUTO: 0.99 10*3/MM3 (ref 0.1–0.9)
MONOCYTES # BLD AUTO: 1.56 10*3/MM3 (ref 0.1–0.9)
MONOCYTES NFR BLD AUTO: 10.6 % (ref 5–12)
MONOCYTES NFR BLD AUTO: 10.8 % (ref 5–12)
MONOCYTES NFR BLD AUTO: 11.5 % (ref 5–12)
MONOCYTES NFR BLD AUTO: 12.4 % (ref 5–12)
MONOCYTES NFR BLD AUTO: 3.3 % (ref 5–12)
MONOCYTES NFR BLD AUTO: 4.1 % (ref 5–12)
MONOCYTES NFR BLD AUTO: 4.3 % (ref 5–12)
MONOCYTES NFR BLD AUTO: 4.5 % (ref 5–12)
MONOCYTES NFR BLD AUTO: 6.2 % (ref 5–12)
MONOCYTES NFR BLD AUTO: 6.7 % (ref 5–12)
MONOCYTES NFR BLD AUTO: 7.3 % (ref 5–12)
MONOCYTES NFR BLD AUTO: 7.7 % (ref 5–12)
MONOCYTES NFR BLD AUTO: 8 % (ref 5–12)
MONOCYTES NFR BLD AUTO: 8.2 % (ref 5–12)
MONOCYTES NFR BLD AUTO: 9 % (ref 5–12)
MONOCYTES NFR BLD AUTO: 9.3 % (ref 5–12)
MONOCYTES NFR BLD AUTO: 9.8 % (ref 5–12)
MYELOCYTES NFR BLD MANUAL: 1 % (ref 0–0)
NEUTROPHILS # BLD AUTO: 9.55 10*3/MM3 (ref 1.7–7)
NEUTROPHILS NFR BLD AUTO: 10.17 10*3/MM3 (ref 1.7–7)
NEUTROPHILS NFR BLD AUTO: 10.29 10*3/MM3 (ref 1.7–7)
NEUTROPHILS NFR BLD AUTO: 11.39 10*3/MM3 (ref 1.7–7)
NEUTROPHILS NFR BLD AUTO: 11.54 10*3/MM3 (ref 1.7–7)
NEUTROPHILS NFR BLD AUTO: 2.94 10*3/MM3 (ref 1.7–7)
NEUTROPHILS NFR BLD AUTO: 3.24 10*3/MM3 (ref 1.7–7)
NEUTROPHILS NFR BLD AUTO: 3.54 10*3/MM3 (ref 1.7–7)
NEUTROPHILS NFR BLD AUTO: 4.3 10*3/MM3 (ref 1.7–7)
NEUTROPHILS NFR BLD AUTO: 4.41 10*3/MM3 (ref 1.7–7)
NEUTROPHILS NFR BLD AUTO: 5.46 10*3/MM3 (ref 1.7–7)
NEUTROPHILS NFR BLD AUTO: 6.42 10*3/MM3 (ref 1.7–7)
NEUTROPHILS NFR BLD AUTO: 6.8 10*3/MM3 (ref 1.7–7)
NEUTROPHILS NFR BLD AUTO: 62.1 % (ref 42.7–76)
NEUTROPHILS NFR BLD AUTO: 70.7 % (ref 42.7–76)
NEUTROPHILS NFR BLD AUTO: 71.5 % (ref 42.7–76)
NEUTROPHILS NFR BLD AUTO: 71.9 % (ref 42.7–76)
NEUTROPHILS NFR BLD AUTO: 76.1 % (ref 42.7–76)
NEUTROPHILS NFR BLD AUTO: 78.5 % (ref 42.7–76)
NEUTROPHILS NFR BLD AUTO: 78.9 % (ref 42.7–76)
NEUTROPHILS NFR BLD AUTO: 8.71 10*3/MM3 (ref 1.7–7)
NEUTROPHILS NFR BLD AUTO: 8.98 10*3/MM3 (ref 1.7–7)
NEUTROPHILS NFR BLD AUTO: 81.8 % (ref 42.7–76)
NEUTROPHILS NFR BLD AUTO: 83.2 % (ref 42.7–76)
NEUTROPHILS NFR BLD AUTO: 83.2 % (ref 42.7–76)
NEUTROPHILS NFR BLD AUTO: 83.7 % (ref 42.7–76)
NEUTROPHILS NFR BLD AUTO: 83.8 % (ref 42.7–76)
NEUTROPHILS NFR BLD AUTO: 84.3 % (ref 42.7–76)
NEUTROPHILS NFR BLD AUTO: 86.4 % (ref 42.7–76)
NEUTROPHILS NFR BLD AUTO: 88.1 % (ref 42.7–76)
NEUTROPHILS NFR BLD AUTO: 88.9 % (ref 42.7–76)
NEUTROPHILS NFR BLD AUTO: 9.24 10*3/MM3 (ref 1.7–7)
NEUTROPHILS NFR BLD AUTO: 9.73 10*3/MM3 (ref 1.7–7)
NEUTROPHILS NFR BLD AUTO: 9.82 10*3/MM3 (ref 1.7–7)
NEUTROPHILS NFR BLD AUTO: 91 % (ref 42.7–76)
NEUTROPHILS NFR BLD MANUAL: 82 % (ref 42.7–76)
NITRITE UR QL STRIP: NEGATIVE
NITRITE UR QL STRIP: POSITIVE
NRBC BLD AUTO-RTO: 0 /100 WBC (ref 0–0.2)
NT-PROBNP SERPL-MCNC: 545.4 PG/ML (ref 0–1800)
PCO2 BLDA: 35.9 MM HG (ref 35–45)
PCO2 BLDA: 39.6 MM HG (ref 35–45)
PCO2 BLDA: 42.4 MM HG (ref 35–45)
PCO2 BLDA: 43.5 MM HG (ref 35–45)
PCO2 BLDA: 47.7 MM HG (ref 35–45)
PCO2 BLDA: 49.1 MM HG (ref 35–45)
PEEP RESPIRATORY: 10 CM[H2O]
PEEP RESPIRATORY: 14 CM[H2O]
PEEP RESPIRATORY: 5 CM[H2O]
PEEP RESPIRATORY: 5 CM[H2O]
PH BLDA: 7.41 PH UNITS (ref 7.35–7.45)
PH BLDA: 7.42 PH UNITS (ref 7.35–7.45)
PH BLDA: 7.43 PH UNITS (ref 7.35–7.45)
PH BLDA: 7.48 PH UNITS (ref 7.35–7.45)
PH BLDA: 7.51 PH UNITS (ref 7.35–7.45)
PH BLDA: 7.51 PH UNITS (ref 7.35–7.45)
PH UR STRIP.AUTO: 5.5 [PH] (ref 5–9)
PH UR STRIP.AUTO: 5.5 [PH] (ref 5–9)
PLATELET # BLD AUTO: 114 10*3/MM3 (ref 140–450)
PLATELET # BLD AUTO: 115 10*3/MM3 (ref 140–450)
PLATELET # BLD AUTO: 130 10*3/MM3 (ref 140–450)
PLATELET # BLD AUTO: 130 10*3/MM3 (ref 140–450)
PLATELET # BLD AUTO: 134 10*3/MM3 (ref 140–450)
PLATELET # BLD AUTO: 159 10*3/MM3 (ref 140–450)
PLATELET # BLD AUTO: 209 10*3/MM3 (ref 140–450)
PLATELET # BLD AUTO: 216 10*3/MM3 (ref 140–450)
PLATELET # BLD AUTO: 225 10*3/MM3 (ref 140–450)
PLATELET # BLD AUTO: 236 10*3/MM3 (ref 140–450)
PLATELET # BLD AUTO: 242 10*3/MM3 (ref 140–450)
PLATELET # BLD AUTO: 259 10*3/MM3 (ref 140–450)
PLATELET # BLD AUTO: 262 10*3/MM3 (ref 140–450)
PLATELET # BLD AUTO: 279 10*3/MM3 (ref 140–450)
PLATELET # BLD AUTO: 283 10*3/MM3 (ref 140–450)
PLATELET # BLD AUTO: 315 10*3/MM3 (ref 140–450)
PLATELET # BLD AUTO: 317 10*3/MM3 (ref 140–450)
PLATELET # BLD AUTO: 340 10*3/MM3 (ref 140–450)
PMV BLD AUTO: 10.3 FL (ref 6–12)
PMV BLD AUTO: 10.4 FL (ref 6–12)
PMV BLD AUTO: 10.4 FL (ref 6–12)
PMV BLD AUTO: 10.6 FL (ref 6–12)
PMV BLD AUTO: 10.7 FL (ref 6–12)
PMV BLD AUTO: 11.2 FL (ref 6–12)
PMV BLD AUTO: 11.2 FL (ref 6–12)
PMV BLD AUTO: 11.3 FL (ref 6–12)
PMV BLD AUTO: 11.3 FL (ref 6–12)
PMV BLD AUTO: 11.6 FL (ref 6–12)
PMV BLD AUTO: 11.8 FL (ref 6–12)
PMV BLD AUTO: 11.9 FL (ref 6–12)
PMV BLD AUTO: 12 FL (ref 6–12)
PMV BLD AUTO: 12 FL (ref 6–12)
PMV BLD AUTO: 9.7 FL (ref 6–12)
PMV BLD AUTO: 9.8 FL (ref 6–12)
PO2 BLDA: 49.2 MM HG (ref 83–108)
PO2 BLDA: 59.6 MM HG (ref 83–108)
PO2 BLDA: 60.8 MM HG (ref 83–108)
PO2 BLDA: 65.7 MM HG (ref 83–108)
PO2 BLDA: 65.9 MM HG (ref 83–108)
PO2 BLDA: 94.5 MM HG (ref 83–108)
POTASSIUM SERPL-SCNC: 3.6 MMOL/L (ref 3.5–5.2)
POTASSIUM SERPL-SCNC: 3.8 MMOL/L (ref 3.5–5.2)
POTASSIUM SERPL-SCNC: 3.9 MMOL/L (ref 3.5–5.2)
POTASSIUM SERPL-SCNC: 3.9 MMOL/L (ref 3.5–5.2)
POTASSIUM SERPL-SCNC: 4 MMOL/L (ref 3.5–5.2)
POTASSIUM SERPL-SCNC: 4.1 MMOL/L (ref 3.5–5.2)
POTASSIUM SERPL-SCNC: 4.3 MMOL/L (ref 3.5–5.2)
POTASSIUM SERPL-SCNC: 4.5 MMOL/L (ref 3.5–5.2)
POTASSIUM SERPL-SCNC: 4.6 MMOL/L (ref 3.5–5.2)
POTASSIUM SERPL-SCNC: 4.7 MMOL/L (ref 3.5–5.2)
POTASSIUM SERPL-SCNC: 4.7 MMOL/L (ref 3.5–5.2)
POTASSIUM SERPL-SCNC: 4.8 MMOL/L (ref 3.5–5.2)
PROCALCITONIN SERPL-MCNC: 0.09 NG/ML (ref 0–0.25)
PROCALCITONIN SERPL-MCNC: 0.11 NG/ML (ref 0–0.25)
PROCALCITONIN SERPL-MCNC: 0.23 NG/ML (ref 0–0.25)
PROT SERPL-MCNC: 5.7 G/DL (ref 6–8.5)
PROT SERPL-MCNC: 5.7 G/DL (ref 6–8.5)
PROT SERPL-MCNC: 6.4 G/DL (ref 6–8.5)
PROT SERPL-MCNC: 6.5 G/DL (ref 6–8.5)
PROT SERPL-MCNC: 6.6 G/DL (ref 6–8.5)
PROT SERPL-MCNC: 6.7 G/DL (ref 6–8.5)
PROT SERPL-MCNC: 6.8 G/DL (ref 6–8.5)
PROT SERPL-MCNC: 7.1 G/DL (ref 6–8.5)
PROT SERPL-MCNC: 7.2 G/DL (ref 6–8.5)
PROT UR QL STRIP: ABNORMAL
PROT UR QL STRIP: ABNORMAL
PROTHROMBIN TIME: 13.7 SECONDS (ref 11.1–15.3)
PSV: 15 CMH2O
QT INTERVAL: 334 MS
QT INTERVAL: 496 MS
QTC INTERVAL: 439 MS
QTC INTERVAL: 558 MS
RBC # BLD AUTO: 4.54 10*6/MM3 (ref 3.77–5.28)
RBC # BLD AUTO: 4.62 10*6/MM3 (ref 3.77–5.28)
RBC # BLD AUTO: 4.63 10*6/MM3 (ref 3.77–5.28)
RBC # BLD AUTO: 4.64 10*6/MM3 (ref 3.77–5.28)
RBC # BLD AUTO: 4.71 10*6/MM3 (ref 3.77–5.28)
RBC # BLD AUTO: 4.71 10*6/MM3 (ref 3.77–5.28)
RBC # BLD AUTO: 4.72 10*6/MM3 (ref 3.77–5.28)
RBC # BLD AUTO: 4.96 10*6/MM3 (ref 3.77–5.28)
RBC # BLD AUTO: 4.98 10*6/MM3 (ref 3.77–5.28)
RBC # BLD AUTO: 4.99 10*6/MM3 (ref 3.77–5.28)
RBC # BLD AUTO: 5.01 10*6/MM3 (ref 3.77–5.28)
RBC # BLD AUTO: 5.09 10*6/MM3 (ref 3.77–5.28)
RBC # BLD AUTO: 5.11 10*6/MM3 (ref 3.77–5.28)
RBC # BLD AUTO: 5.16 10*6/MM3 (ref 3.77–5.28)
RBC # BLD AUTO: 5.18 10*6/MM3 (ref 3.77–5.28)
RBC # BLD AUTO: 5.2 10*6/MM3 (ref 3.77–5.28)
RBC # BLD AUTO: 5.24 10*6/MM3 (ref 3.77–5.28)
RBC # BLD AUTO: 5.48 10*6/MM3 (ref 3.77–5.28)
RBC # UR: ABNORMAL /HPF
RBC # UR: ABNORMAL /HPF
RBC MORPH BLD: NORMAL
REF LAB TEST METHOD: ABNORMAL
REF LAB TEST METHOD: ABNORMAL
SAO2 % BLDCOA: 88.3 % (ref 94–99)
SAO2 % BLDCOA: 91.5 % (ref 94–99)
SAO2 % BLDCOA: 92.1 % (ref 94–99)
SAO2 % BLDCOA: 92.2 % (ref 94–99)
SAO2 % BLDCOA: 94.2 % (ref 94–99)
SAO2 % BLDCOA: 96.8 % (ref 94–99)
SARS-COV-2 RNA PNL SPEC NAA+PROBE: DETECTED
SET MECH RESP RATE: 20
SMALL PLATELETS BLD QL SMEAR: ADEQUATE
SMALL PLATELETS BLD QL SMEAR: ADEQUATE
SODIUM SERPL-SCNC: 132 MMOL/L (ref 136–145)
SODIUM SERPL-SCNC: 133 MMOL/L (ref 136–145)
SODIUM SERPL-SCNC: 134 MMOL/L (ref 136–145)
SODIUM SERPL-SCNC: 135 MMOL/L (ref 136–145)
SODIUM SERPL-SCNC: 136 MMOL/L (ref 136–145)
SODIUM SERPL-SCNC: 137 MMOL/L (ref 136–145)
SODIUM SERPL-SCNC: 137 MMOL/L (ref 136–145)
SODIUM SERPL-SCNC: 138 MMOL/L (ref 136–145)
SODIUM SERPL-SCNC: 138 MMOL/L (ref 136–145)
SODIUM SERPL-SCNC: 139 MMOL/L (ref 136–145)
SODIUM SERPL-SCNC: 139 MMOL/L (ref 136–145)
SODIUM SERPL-SCNC: 140 MMOL/L (ref 136–145)
SODIUM SERPL-SCNC: 140 MMOL/L (ref 136–145)
SODIUM SERPL-SCNC: 142 MMOL/L (ref 136–145)
SODIUM SERPL-SCNC: 143 MMOL/L (ref 136–145)
SODIUM SERPL-SCNC: 144 MMOL/L (ref 136–145)
SODIUM SERPL-SCNC: 145 MMOL/L (ref 136–145)
SP GR UR STRIP: 1.02 (ref 1–1.03)
SP GR UR STRIP: 1.02 (ref 1–1.03)
SQUAMOUS #/AREA URNS HPF: ABNORMAL /HPF
SQUAMOUS #/AREA URNS HPF: ABNORMAL /HPF
STRESS TARGET HR: 121 BPM
TROPONIN T SERPL-MCNC: 0.01 NG/ML (ref 0–0.03)
TROPONIN T SERPL-MCNC: 0.01 NG/ML (ref 0–0.03)
TROPONIN T SERPL-MCNC: <0.01 NG/ML (ref 0–0.03)
UROBILINOGEN UR QL STRIP: ABNORMAL
UROBILINOGEN UR QL STRIP: ABNORMAL
VENTILATOR MODE: ABNORMAL
VENTILATOR MODE: AC
VT ON VENT VENT: 500 ML
WBC # BLD AUTO: 10.07 10*3/MM3 (ref 3.4–10.8)
WBC # BLD AUTO: 10.4 10*3/MM3 (ref 3.4–10.8)
WBC # BLD AUTO: 10.72 10*3/MM3 (ref 3.4–10.8)
WBC # BLD AUTO: 11.54 10*3/MM3 (ref 3.4–10.8)
WBC # BLD AUTO: 11.65 10*3/MM3 (ref 3.4–10.8)
WBC # BLD AUTO: 11.71 10*3/MM3 (ref 3.4–10.8)
WBC # BLD AUTO: 11.89 10*3/MM3 (ref 3.4–10.8)
WBC # BLD AUTO: 12.37 10*3/MM3 (ref 3.4–10.8)
WBC # BLD AUTO: 12.67 10*3/MM3 (ref 3.4–10.8)
WBC # BLD AUTO: 14.43 10*3/MM3 (ref 3.4–10.8)
WBC # BLD AUTO: 4.58 10*3/MM3 (ref 3.4–10.8)
WBC # BLD AUTO: 4.74 10*3/MM3 (ref 3.4–10.8)
WBC # BLD AUTO: 4.95 10*3/MM3 (ref 3.4–10.8)
WBC # BLD AUTO: 5.8 10*3/MM3 (ref 3.4–10.8)
WBC # BLD AUTO: 5.97 10*3/MM3 (ref 3.4–10.8)
WBC # BLD AUTO: 6.96 10*3/MM3 (ref 3.4–10.8)
WBC # BLD AUTO: 7.62 10*3/MM3 (ref 3.4–10.8)
WBC # BLD AUTO: 8.18 10*3/MM3 (ref 3.4–10.8)
WBC MORPH BLD: NORMAL
WBC MORPH BLD: NORMAL
WBC UR QL AUTO: ABNORMAL /HPF
WBC UR QL AUTO: ABNORMAL /HPF

## 2021-01-01 PROCEDURE — 94799 UNLISTED PULMONARY SVC/PX: CPT

## 2021-01-01 PROCEDURE — 80053 COMPREHEN METABOLIC PANEL: CPT | Performed by: INTERNAL MEDICINE

## 2021-01-01 PROCEDURE — 97110 THERAPEUTIC EXERCISES: CPT

## 2021-01-01 PROCEDURE — 83615 LACTATE (LD) (LDH) ENZYME: CPT | Performed by: NURSE PRACTITIONER

## 2021-01-01 PROCEDURE — 25010000002 ENOXAPARIN PER 10 MG: Performed by: INTERNAL MEDICINE

## 2021-01-01 PROCEDURE — 25010000002 DEXAMETHASONE PER 1 MG: Performed by: NURSE PRACTITIONER

## 2021-01-01 PROCEDURE — 97530 THERAPEUTIC ACTIVITIES: CPT

## 2021-01-01 PROCEDURE — 25010000002 PROPOFOL 10 MG/ML EMULSION: Performed by: HOSPITALIST

## 2021-01-01 PROCEDURE — 87040 BLOOD CULTURE FOR BACTERIA: CPT | Performed by: EMERGENCY MEDICINE

## 2021-01-01 PROCEDURE — 85025 COMPLETE CBC W/AUTO DIFF WBC: CPT | Performed by: HOSPITALIST

## 2021-01-01 PROCEDURE — 94640 AIRWAY INHALATION TREATMENT: CPT

## 2021-01-01 PROCEDURE — 84484 ASSAY OF TROPONIN QUANT: CPT | Performed by: EMERGENCY MEDICINE

## 2021-01-01 PROCEDURE — 63710000001 DEXAMETHASONE PER 0.25 MG: Performed by: NURSE PRACTITIONER

## 2021-01-01 PROCEDURE — 25010000002 CEFTRIAXONE PER 250 MG: Performed by: INTERNAL MEDICINE

## 2021-01-01 PROCEDURE — 86140 C-REACTIVE PROTEIN: CPT | Performed by: NURSE PRACTITIONER

## 2021-01-01 PROCEDURE — 25010000002 CEFTRIAXONE PER 250 MG: Performed by: EMERGENCY MEDICINE

## 2021-01-01 PROCEDURE — 83735 ASSAY OF MAGNESIUM: CPT | Performed by: INTERNAL MEDICINE

## 2021-01-01 PROCEDURE — 82962 GLUCOSE BLOOD TEST: CPT

## 2021-01-01 PROCEDURE — 85379 FIBRIN DEGRADATION QUANT: CPT | Performed by: NURSE PRACTITIONER

## 2021-01-01 PROCEDURE — 71275 CT ANGIOGRAPHY CHEST: CPT

## 2021-01-01 PROCEDURE — 84145 PROCALCITONIN (PCT): CPT | Performed by: NURSE PRACTITIONER

## 2021-01-01 PROCEDURE — 87077 CULTURE AEROBIC IDENTIFY: CPT | Performed by: NURSE PRACTITIONER

## 2021-01-01 PROCEDURE — 87636 SARSCOV2 & INF A&B AMP PRB: CPT | Performed by: EMERGENCY MEDICINE

## 2021-01-01 PROCEDURE — 85027 COMPLETE CBC AUTOMATED: CPT | Performed by: HOSPITALIST

## 2021-01-01 PROCEDURE — 25010000002 MIDAZOLAM 50 MG/10ML SOLUTION: Performed by: HOSPITALIST

## 2021-01-01 PROCEDURE — 82728 ASSAY OF FERRITIN: CPT | Performed by: NURSE PRACTITIONER

## 2021-01-01 PROCEDURE — 25010000002 LORAZEPAM PER 2 MG: Performed by: INTERNAL MEDICINE

## 2021-01-01 PROCEDURE — 25010000002 HALOPERIDOL LACTATE PER 5 MG: Performed by: INTERNAL MEDICINE

## 2021-01-01 PROCEDURE — 94760 N-INVAS EAR/PLS OXIMETRY 1: CPT

## 2021-01-01 PROCEDURE — 83605 ASSAY OF LACTIC ACID: CPT | Performed by: INTERNAL MEDICINE

## 2021-01-01 PROCEDURE — 80048 BASIC METABOLIC PNL TOTAL CA: CPT | Performed by: HOSPITALIST

## 2021-01-01 PROCEDURE — 25010000002 SUCCINYLCHOLINE PER 20 MG: Performed by: HOSPITALIST

## 2021-01-01 PROCEDURE — 87070 CULTURE OTHR SPECIMN AEROBIC: CPT | Performed by: HOSPITALIST

## 2021-01-01 PROCEDURE — 85610 PROTHROMBIN TIME: CPT | Performed by: EMERGENCY MEDICINE

## 2021-01-01 PROCEDURE — 85025 COMPLETE CBC W/AUTO DIFF WBC: CPT | Performed by: INTERNAL MEDICINE

## 2021-01-01 PROCEDURE — 80053 COMPREHEN METABOLIC PANEL: CPT | Performed by: NURSE PRACTITIONER

## 2021-01-01 PROCEDURE — 93308 TTE F-UP OR LMTD: CPT | Performed by: INTERNAL MEDICINE

## 2021-01-01 PROCEDURE — 97535 SELF CARE MNGMENT TRAINING: CPT

## 2021-01-01 PROCEDURE — 94003 VENT MGMT INPAT SUBQ DAY: CPT

## 2021-01-01 PROCEDURE — 36600 WITHDRAWAL OF ARTERIAL BLOOD: CPT

## 2021-01-01 PROCEDURE — 85025 COMPLETE CBC W/AUTO DIFF WBC: CPT | Performed by: EMERGENCY MEDICINE

## 2021-01-01 PROCEDURE — 0 IOPAMIDOL PER 1 ML: Performed by: EMERGENCY MEDICINE

## 2021-01-01 PROCEDURE — 25010000002 FENTANYL CITRATE (PF) 50 MCG/ML SOLUTION: Performed by: INTERNAL MEDICINE

## 2021-01-01 PROCEDURE — 84484 ASSAY OF TROPONIN QUANT: CPT | Performed by: NURSE PRACTITIONER

## 2021-01-01 PROCEDURE — 86140 C-REACTIVE PROTEIN: CPT | Performed by: INTERNAL MEDICINE

## 2021-01-01 PROCEDURE — 25010000002 FENTANYL CITRATE (PF) 250 MCG/5ML SOLUTION: Performed by: HOSPITALIST

## 2021-01-01 PROCEDURE — 71045 X-RAY EXAM CHEST 1 VIEW: CPT

## 2021-01-01 PROCEDURE — 83605 ASSAY OF LACTIC ACID: CPT | Performed by: EMERGENCY MEDICINE

## 2021-01-01 PROCEDURE — 94660 CPAP INITIATION&MGMT: CPT

## 2021-01-01 PROCEDURE — 99285 EMERGENCY DEPT VISIT HI MDM: CPT

## 2021-01-01 PROCEDURE — 25010000002 HYDROMORPHONE 1 MG/ML SOLUTION: Performed by: INTERNAL MEDICINE

## 2021-01-01 PROCEDURE — 82803 BLOOD GASES ANY COMBINATION: CPT

## 2021-01-01 PROCEDURE — 83615 LACTATE (LD) (LDH) ENZYME: CPT | Performed by: INTERNAL MEDICINE

## 2021-01-01 PROCEDURE — 85025 COMPLETE CBC W/AUTO DIFF WBC: CPT | Performed by: NURSE PRACTITIONER

## 2021-01-01 PROCEDURE — 36410 VNPNXR 3YR/> PHY/QHP DX/THER: CPT

## 2021-01-01 PROCEDURE — G0378 HOSPITAL OBSERVATION PER HR: HCPCS

## 2021-01-01 PROCEDURE — 63710000001 DEXAMETHASONE PER 0.25 MG: Performed by: INTERNAL MEDICINE

## 2021-01-01 PROCEDURE — 80053 COMPREHEN METABOLIC PANEL: CPT | Performed by: EMERGENCY MEDICINE

## 2021-01-01 PROCEDURE — 93010 ELECTROCARDIOGRAM REPORT: CPT | Performed by: INTERNAL MEDICINE

## 2021-01-01 PROCEDURE — 85379 FIBRIN DEGRADATION QUANT: CPT | Performed by: EMERGENCY MEDICINE

## 2021-01-01 PROCEDURE — 36415 COLL VENOUS BLD VENIPUNCTURE: CPT | Performed by: INTERNAL MEDICINE

## 2021-01-01 PROCEDURE — 25010000002 AZITHROMYCIN PER 500 MG: Performed by: EMERGENCY MEDICINE

## 2021-01-01 PROCEDURE — 97162 PT EVAL MOD COMPLEX 30 MIN: CPT

## 2021-01-01 PROCEDURE — 85651 RBC SED RATE NONAUTOMATED: CPT | Performed by: INTERNAL MEDICINE

## 2021-01-01 PROCEDURE — 81001 URINALYSIS AUTO W/SCOPE: CPT | Performed by: INTERNAL MEDICINE

## 2021-01-01 PROCEDURE — 93308 TTE F-UP OR LMTD: CPT

## 2021-01-01 PROCEDURE — 81001 URINALYSIS AUTO W/SCOPE: CPT | Performed by: EMERGENCY MEDICINE

## 2021-01-01 PROCEDURE — 84145 PROCALCITONIN (PCT): CPT | Performed by: EMERGENCY MEDICINE

## 2021-01-01 PROCEDURE — 85379 FIBRIN DEGRADATION QUANT: CPT | Performed by: INTERNAL MEDICINE

## 2021-01-01 PROCEDURE — 25010000002 FUROSEMIDE PER 20 MG: Performed by: NURSE PRACTITIONER

## 2021-01-01 PROCEDURE — 93005 ELECTROCARDIOGRAM TRACING: CPT | Performed by: NURSE PRACTITIONER

## 2021-01-01 PROCEDURE — 87086 URINE CULTURE/COLONY COUNT: CPT | Performed by: NURSE PRACTITIONER

## 2021-01-01 PROCEDURE — 85730 THROMBOPLASTIN TIME PARTIAL: CPT | Performed by: EMERGENCY MEDICINE

## 2021-01-01 PROCEDURE — 31500 INSERT EMERGENCY AIRWAY: CPT | Performed by: HOSPITALIST

## 2021-01-01 PROCEDURE — 93325 DOPPLER ECHO COLOR FLOW MAPG: CPT | Performed by: INTERNAL MEDICINE

## 2021-01-01 PROCEDURE — 25010000002 FENTANYL CITRATE (PF) 50 MCG/ML SOLUTION

## 2021-01-01 PROCEDURE — 84484 ASSAY OF TROPONIN QUANT: CPT | Performed by: INTERNAL MEDICINE

## 2021-01-01 PROCEDURE — C1751 CATH, INF, PER/CENT/MIDLINE: HCPCS

## 2021-01-01 PROCEDURE — 93325 DOPPLER ECHO COLOR FLOW MAPG: CPT

## 2021-01-01 PROCEDURE — 0BH18EZ INSERTION OF ENDOTRACHEAL AIRWAY INTO TRACHEA, VIA NATURAL OR ARTIFICIAL OPENING ENDOSCOPIC: ICD-10-PCS | Performed by: HOSPITALIST

## 2021-01-01 PROCEDURE — 85007 BL SMEAR W/DIFF WBC COUNT: CPT | Performed by: INTERNAL MEDICINE

## 2021-01-01 PROCEDURE — 83880 ASSAY OF NATRIURETIC PEPTIDE: CPT | Performed by: EMERGENCY MEDICINE

## 2021-01-01 PROCEDURE — 25010000002 PERFLUTREN (DEFINITY) 8.476 MG IN SODIUM CHLORIDE (PF) 0.9 % 10 ML INJECTION: Performed by: INTERNAL MEDICINE

## 2021-01-01 PROCEDURE — 76937 US GUIDE VASCULAR ACCESS: CPT

## 2021-01-01 PROCEDURE — 93005 ELECTROCARDIOGRAM TRACING: CPT | Performed by: INTERNAL MEDICINE

## 2021-01-01 PROCEDURE — 93005 ELECTROCARDIOGRAM TRACING: CPT | Performed by: EMERGENCY MEDICINE

## 2021-01-01 PROCEDURE — 97166 OT EVAL MOD COMPLEX 45 MIN: CPT

## 2021-01-01 PROCEDURE — P9612 CATHETERIZE FOR URINE SPEC: HCPCS

## 2021-01-01 PROCEDURE — 87205 SMEAR GRAM STAIN: CPT | Performed by: HOSPITALIST

## 2021-01-01 PROCEDURE — 5A1955Z RESPIRATORY VENTILATION, GREATER THAN 96 CONSECUTIVE HOURS: ICD-10-PCS | Performed by: HOSPITALIST

## 2021-01-01 PROCEDURE — 94002 VENT MGMT INPAT INIT DAY: CPT

## 2021-01-01 PROCEDURE — XW0DXM6 INTRODUCTION OF BARICITINIB INTO MOUTH AND PHARYNX, EXTERNAL APPROACH, NEW TECHNOLOGY GROUP 6: ICD-10-PCS | Performed by: NURSE PRACTITIONER

## 2021-01-01 PROCEDURE — 85007 BL SMEAR W/DIFF WBC COUNT: CPT | Performed by: HOSPITALIST

## 2021-01-01 PROCEDURE — 87186 SC STD MICRODIL/AGAR DIL: CPT | Performed by: NURSE PRACTITIONER

## 2021-01-01 RX ORDER — GABAPENTIN 100 MG/1
200 CAPSULE ORAL NIGHTLY
Status: DISCONTINUED | OUTPATIENT
Start: 2021-01-01 | End: 2021-01-01

## 2021-01-01 RX ORDER — LORAZEPAM 2 MG/ML
1 INJECTION INTRAMUSCULAR ONCE
Status: DISCONTINUED | OUTPATIENT
Start: 2021-01-01 | End: 2021-01-01

## 2021-01-01 RX ORDER — LORAZEPAM 2 MG/ML
1 INJECTION INTRAMUSCULAR
Status: DISCONTINUED | OUTPATIENT
Start: 2021-01-01 | End: 2021-01-01 | Stop reason: HOSPADM

## 2021-01-01 RX ORDER — HYDROCODONE BITARTRATE AND ACETAMINOPHEN 10; 325 MG/1; MG/1
1 TABLET ORAL 2 TIMES DAILY PRN
Status: DISPENSED | OUTPATIENT
Start: 2021-01-01 | End: 2021-01-01

## 2021-01-01 RX ORDER — ISOSORBIDE MONONITRATE 30 MG/1
30 TABLET, EXTENDED RELEASE ORAL EVERY MORNING
Status: DISCONTINUED | OUTPATIENT
Start: 2021-01-01 | End: 2021-01-01

## 2021-01-01 RX ORDER — NITROGLYCERIN 0.4 MG/1
0.4 TABLET SUBLINGUAL
Status: DISCONTINUED | OUTPATIENT
Start: 2021-01-01 | End: 2021-01-01 | Stop reason: SDUPTHER

## 2021-01-01 RX ORDER — ALBUTEROL SULFATE 90 UG/1
2 AEROSOL, METERED RESPIRATORY (INHALATION)
Status: DISCONTINUED | OUTPATIENT
Start: 2021-01-01 | End: 2021-01-01

## 2021-01-01 RX ORDER — PANTOPRAZOLE SODIUM 40 MG/1
40 TABLET, DELAYED RELEASE ORAL DAILY
Status: DISCONTINUED | OUTPATIENT
Start: 2021-01-01 | End: 2021-01-01

## 2021-01-01 RX ORDER — SODIUM CHLORIDE 0.9 % (FLUSH) 0.9 %
10 SYRINGE (ML) INJECTION EVERY 12 HOURS SCHEDULED
Status: DISCONTINUED | OUTPATIENT
Start: 2021-01-01 | End: 2021-01-01 | Stop reason: HOSPADM

## 2021-01-01 RX ORDER — HYDROXYZINE HYDROCHLORIDE 25 MG/1
25 TABLET, FILM COATED ORAL 3 TIMES DAILY PRN
Status: DISCONTINUED | OUTPATIENT
Start: 2021-01-01 | End: 2021-01-01 | Stop reason: HOSPADM

## 2021-01-01 RX ORDER — BUDESONIDE AND FORMOTEROL FUMARATE DIHYDRATE 160; 4.5 UG/1; UG/1
2 AEROSOL RESPIRATORY (INHALATION)
Status: DISCONTINUED | OUTPATIENT
Start: 2021-01-01 | End: 2021-01-01

## 2021-01-01 RX ORDER — ALBUTEROL SULFATE 90 UG/1
2 AEROSOL, METERED RESPIRATORY (INHALATION) EVERY 6 HOURS PRN
Status: DISCONTINUED | OUTPATIENT
Start: 2021-01-01 | End: 2021-01-01

## 2021-01-01 RX ORDER — POTASSIUM CHLORIDE 750 MG/1
10 CAPSULE, EXTENDED RELEASE ORAL DAILY
Status: DISCONTINUED | OUTPATIENT
Start: 2021-01-01 | End: 2021-01-01

## 2021-01-01 RX ORDER — HYDROXYZINE HYDROCHLORIDE 25 MG/1
12.5 TABLET, FILM COATED ORAL 3 TIMES DAILY PRN
Status: DISCONTINUED | OUTPATIENT
Start: 2021-01-01 | End: 2021-01-01

## 2021-01-01 RX ORDER — NOREPINEPHRINE BIT/0.9 % NACL 8 MG/250ML
.02-.3 INFUSION BOTTLE (ML) INTRAVENOUS
Status: DISCONTINUED | OUTPATIENT
Start: 2021-01-01 | End: 2021-01-01

## 2021-01-01 RX ORDER — HALOPERIDOL 5 MG/ML
2 INJECTION INTRAMUSCULAR ONCE
Status: COMPLETED | OUTPATIENT
Start: 2021-01-01 | End: 2021-01-01

## 2021-01-01 RX ORDER — BUMETANIDE 1 MG/1
1 TABLET ORAL EVERY MORNING
Status: DISCONTINUED | OUTPATIENT
Start: 2021-01-01 | End: 2021-01-01

## 2021-01-01 RX ORDER — PANTOPRAZOLE SODIUM 40 MG/10ML
40 INJECTION, POWDER, LYOPHILIZED, FOR SOLUTION INTRAVENOUS
Status: DISCONTINUED | OUTPATIENT
Start: 2021-01-01 | End: 2021-01-01

## 2021-01-01 RX ORDER — ACETAMINOPHEN 325 MG/1
650 TABLET ORAL EVERY 4 HOURS PRN
Status: DISCONTINUED | OUTPATIENT
Start: 2021-01-01 | End: 2021-01-01 | Stop reason: HOSPADM

## 2021-01-01 RX ORDER — FENTANYL CITRATE 50 UG/ML
INJECTION, SOLUTION INTRAMUSCULAR; INTRAVENOUS
Status: COMPLETED
Start: 2021-01-01 | End: 2021-01-01

## 2021-01-01 RX ORDER — FENTANYL CITRATE 50 UG/ML
25 INJECTION, SOLUTION INTRAMUSCULAR; INTRAVENOUS
Status: DISCONTINUED | OUTPATIENT
Start: 2021-01-01 | End: 2021-01-01

## 2021-01-01 RX ORDER — SODIUM CHLORIDE 0.9 % (FLUSH) 0.9 %
10 SYRINGE (ML) INJECTION AS NEEDED
Status: DISCONTINUED | OUTPATIENT
Start: 2021-01-01 | End: 2021-01-01 | Stop reason: HOSPADM

## 2021-01-01 RX ORDER — NITROGLYCERIN 0.4 MG/1
0.4 TABLET SUBLINGUAL
Status: DISCONTINUED | OUTPATIENT
Start: 2021-01-01 | End: 2021-01-01

## 2021-01-01 RX ORDER — SUCCINYLCHOLINE CHLORIDE 20 MG/ML
100 INJECTION INTRAMUSCULAR; INTRAVENOUS ONCE
Status: COMPLETED | OUTPATIENT
Start: 2021-01-01 | End: 2021-01-01

## 2021-01-01 RX ORDER — MELATONIN
1000 EVERY MORNING
Status: DISCONTINUED | OUTPATIENT
Start: 2021-01-01 | End: 2021-01-01 | Stop reason: SDUPTHER

## 2021-01-01 RX ORDER — TRAZODONE HYDROCHLORIDE 100 MG/1
100 TABLET ORAL NIGHTLY PRN
Status: DISCONTINUED | OUTPATIENT
Start: 2021-01-01 | End: 2021-01-01 | Stop reason: HOSPADM

## 2021-01-01 RX ORDER — FENTANYL CITRATE 50 UG/ML
50 INJECTION, SOLUTION INTRAMUSCULAR; INTRAVENOUS
Status: DISCONTINUED | OUTPATIENT
Start: 2021-01-01 | End: 2021-01-01

## 2021-01-01 RX ORDER — ETOMIDATE 2 MG/ML
20 INJECTION INTRAVENOUS ONCE
Status: COMPLETED | OUTPATIENT
Start: 2021-01-01 | End: 2021-01-01

## 2021-01-01 RX ORDER — SODIUM CHLORIDE 9 MG/ML
75 INJECTION, SOLUTION INTRAVENOUS CONTINUOUS
Status: DISCONTINUED | OUTPATIENT
Start: 2021-01-01 | End: 2021-01-01

## 2021-01-01 RX ORDER — PRAMIPEXOLE DIHYDROCHLORIDE 0.12 MG/1
0.25 TABLET ORAL NIGHTLY
Status: DISCONTINUED | OUTPATIENT
Start: 2021-01-01 | End: 2021-01-01

## 2021-01-01 RX ORDER — SODIUM CHLORIDE 9 MG/ML
INJECTION, SOLUTION INTRAVENOUS
Status: DISPENSED
Start: 2021-01-01 | End: 2021-01-01

## 2021-01-01 RX ORDER — MELATONIN
1000 DAILY
Status: DISCONTINUED | OUTPATIENT
Start: 2021-01-01 | End: 2021-01-01

## 2021-01-01 RX ORDER — BENZONATATE 100 MG/1
100 CAPSULE ORAL 3 TIMES DAILY PRN
Status: DISCONTINUED | OUTPATIENT
Start: 2021-01-01 | End: 2021-01-01

## 2021-01-01 RX ORDER — POLYETHYLENE GLYCOL 3350 17 G/17G
1 POWDER, FOR SOLUTION ORAL ONCE
Status: COMPLETED | OUTPATIENT
Start: 2021-01-01 | End: 2021-01-01

## 2021-01-01 RX ORDER — NYSTATIN 100000 [USP'U]/G
POWDER TOPICAL EVERY 12 HOURS SCHEDULED
Status: DISCONTINUED | OUTPATIENT
Start: 2021-01-01 | End: 2021-01-01

## 2021-01-01 RX ORDER — BUMETANIDE 0.25 MG/ML
0.5 INJECTION INTRAMUSCULAR; INTRAVENOUS
Status: DISCONTINUED | OUTPATIENT
Start: 2021-01-01 | End: 2021-01-01

## 2021-01-01 RX ORDER — ACETAMINOPHEN 650 MG/1
650 SUPPOSITORY RECTAL EVERY 4 HOURS PRN
Status: DISCONTINUED | OUTPATIENT
Start: 2021-01-01 | End: 2021-01-01 | Stop reason: HOSPADM

## 2021-01-01 RX ORDER — IPRATROPIUM BROMIDE AND ALBUTEROL SULFATE 2.5; .5 MG/3ML; MG/3ML
3 SOLUTION RESPIRATORY (INHALATION) EVERY 6 HOURS PRN
Status: DISCONTINUED | OUTPATIENT
Start: 2021-01-01 | End: 2021-01-01

## 2021-01-01 RX ORDER — ASPIRIN 81 MG/1
81 TABLET, CHEWABLE ORAL EVERY MORNING
Status: DISCONTINUED | OUTPATIENT
Start: 2021-01-01 | End: 2021-01-01

## 2021-01-01 RX ORDER — MECLIZINE HCL 12.5 MG/1
12.5 TABLET ORAL EVERY 12 HOURS PRN
Status: DISCONTINUED | OUTPATIENT
Start: 2021-01-01 | End: 2021-01-01

## 2021-01-01 RX ORDER — ETOMIDATE 2 MG/ML
20 INJECTION INTRAVENOUS ONCE
Status: DISCONTINUED | OUTPATIENT
Start: 2021-01-01 | End: 2021-01-01

## 2021-01-01 RX ORDER — ATROPINE SULFATE 10 MG/ML
2 SOLUTION/ DROPS OPHTHALMIC EVERY 4 HOURS PRN
Status: DISCONTINUED | OUTPATIENT
Start: 2021-01-01 | End: 2021-01-01 | Stop reason: HOSPADM

## 2021-01-01 RX ORDER — HYDROXYZINE HYDROCHLORIDE 25 MG/ML
25 INJECTION, SOLUTION INTRAMUSCULAR EVERY 6 HOURS PRN
Status: DISCONTINUED | OUTPATIENT
Start: 2021-01-01 | End: 2021-01-01 | Stop reason: HOSPADM

## 2021-01-01 RX ORDER — LATANOPROST 50 UG/ML
1 SOLUTION/ DROPS OPHTHALMIC NIGHTLY
Status: DISCONTINUED | OUTPATIENT
Start: 2021-01-01 | End: 2021-01-01 | Stop reason: HOSPADM

## 2021-01-01 RX ORDER — ZINC SULFATE 50(220)MG
220 CAPSULE ORAL DAILY
Status: DISCONTINUED | OUTPATIENT
Start: 2021-01-01 | End: 2021-01-01

## 2021-01-01 RX ORDER — SODIUM CHLORIDE 0.9 % (FLUSH) 0.9 %
10 SYRINGE (ML) INJECTION EVERY 12 HOURS SCHEDULED
Status: DISCONTINUED | OUTPATIENT
Start: 2021-01-01 | End: 2021-01-01

## 2021-01-01 RX ORDER — PRAVASTATIN SODIUM 20 MG
20 TABLET ORAL NIGHTLY
Status: DISCONTINUED | OUTPATIENT
Start: 2021-01-01 | End: 2021-01-01

## 2021-01-01 RX ORDER — SCOLOPAMINE TRANSDERMAL SYSTEM 1 MG/1
1 PATCH, EXTENDED RELEASE TRANSDERMAL
Status: DISCONTINUED | OUTPATIENT
Start: 2021-01-01 | End: 2021-01-01 | Stop reason: HOSPADM

## 2021-01-01 RX ORDER — LISINOPRIL 40 MG/1
40 TABLET ORAL EVERY MORNING
Status: DISCONTINUED | OUTPATIENT
Start: 2021-01-01 | End: 2021-01-01

## 2021-01-01 RX ORDER — FUROSEMIDE 10 MG/ML
40 INJECTION INTRAMUSCULAR; INTRAVENOUS ONCE
Status: COMPLETED | OUTPATIENT
Start: 2021-01-01 | End: 2021-01-01

## 2021-01-01 RX ORDER — HYDRALAZINE HYDROCHLORIDE 20 MG/ML
10 INJECTION INTRAMUSCULAR; INTRAVENOUS ONCE
Status: DISCONTINUED | OUTPATIENT
Start: 2021-01-01 | End: 2021-01-01

## 2021-01-01 RX ORDER — DEXAMETHASONE SODIUM PHOSPHATE 4 MG/ML
6 INJECTION, SOLUTION INTRA-ARTICULAR; INTRALESIONAL; INTRAMUSCULAR; INTRAVENOUS; SOFT TISSUE DAILY
Status: DISCONTINUED | OUTPATIENT
Start: 2021-01-01 | End: 2021-01-01

## 2021-01-01 RX ORDER — DULOXETIN HYDROCHLORIDE 60 MG/1
60 CAPSULE, DELAYED RELEASE ORAL EVERY MORNING
Status: DISCONTINUED | OUTPATIENT
Start: 2021-01-01 | End: 2021-01-01

## 2021-01-01 RX ORDER — DEXAMETHASONE SODIUM PHOSPHATE 4 MG/ML
6 INJECTION, SOLUTION INTRA-ARTICULAR; INTRALESIONAL; INTRAMUSCULAR; INTRAVENOUS; SOFT TISSUE EVERY 12 HOURS SCHEDULED
Status: DISCONTINUED | OUTPATIENT
Start: 2021-01-01 | End: 2021-01-01

## 2021-01-01 RX ORDER — ASCORBIC ACID 500 MG
1000 TABLET ORAL DAILY
Status: DISCONTINUED | OUTPATIENT
Start: 2021-01-01 | End: 2021-01-01

## 2021-01-01 RX ORDER — FENTANYL CITRATE 50 UG/ML
25 INJECTION, SOLUTION INTRAMUSCULAR; INTRAVENOUS
Status: ACTIVE | OUTPATIENT
Start: 2021-01-01 | End: 2021-01-01

## 2021-01-01 RX ORDER — SUCCINYLCHOLINE CHLORIDE 20 MG/ML
100 INJECTION INTRAMUSCULAR; INTRAVENOUS ONCE
Status: DISCONTINUED | OUTPATIENT
Start: 2021-01-01 | End: 2021-01-01

## 2021-01-01 RX ORDER — SUCRALFATE 1 G/1
1 TABLET ORAL 2 TIMES DAILY
Status: DISCONTINUED | OUTPATIENT
Start: 2021-01-01 | End: 2021-01-01

## 2021-01-01 RX ORDER — TIZANIDINE 2 MG/1
2 TABLET ORAL 2 TIMES DAILY PRN
Status: DISCONTINUED | OUTPATIENT
Start: 2021-01-01 | End: 2021-01-01 | Stop reason: HOSPADM

## 2021-01-01 RX ORDER — ASPIRIN 81 MG/1
324 TABLET, CHEWABLE ORAL ONCE
Status: COMPLETED | OUTPATIENT
Start: 2021-01-01 | End: 2021-01-01

## 2021-01-01 RX ORDER — ASPIRIN 81 MG/1
81 TABLET, CHEWABLE ORAL DAILY
Status: DISCONTINUED | OUTPATIENT
Start: 2021-01-01 | End: 2021-01-01

## 2021-01-01 RX ORDER — LORAZEPAM 2 MG/ML
1 INJECTION INTRAMUSCULAR EVERY 4 HOURS PRN
Status: CANCELLED | OUTPATIENT
Start: 2021-01-01 | End: 2021-01-01

## 2021-01-01 RX ADMIN — PROPOFOL 25 MCG/KG/MIN: 10 INJECTION, EMULSION INTRAVENOUS at 03:33

## 2021-01-01 RX ADMIN — ALBUTEROL SULFATE 2 PUFF: 90 AEROSOL, METERED RESPIRATORY (INHALATION) at 07:40

## 2021-01-01 RX ADMIN — ALBUTEROL SULFATE 2 PUFF: 90 AEROSOL, METERED RESPIRATORY (INHALATION) at 08:27

## 2021-01-01 RX ADMIN — GABAPENTIN 200 MG: 100 CAPSULE ORAL at 20:59

## 2021-01-01 RX ADMIN — ALBUTEROL SULFATE 2 PUFF: 90 AEROSOL, METERED RESPIRATORY (INHALATION) at 16:02

## 2021-01-01 RX ADMIN — PANTOPRAZOLE SODIUM 40 MG: 40 TABLET, DELAYED RELEASE ORAL at 08:50

## 2021-01-01 RX ADMIN — ALBUTEROL SULFATE 2 PUFF: 90 AEROSOL, METERED RESPIRATORY (INHALATION) at 08:08

## 2021-01-01 RX ADMIN — FENTANYL CITRATE 50 MCG: 50 INJECTION INTRAMUSCULAR; INTRAVENOUS at 23:16

## 2021-01-01 RX ADMIN — ASPIRIN 81 MG 81 MG: 81 TABLET ORAL at 09:01

## 2021-01-01 RX ADMIN — BUDESONIDE AND FORMOTEROL FUMARATE DIHYDRATE 2 PUFF: 160; 4.5 AEROSOL RESPIRATORY (INHALATION) at 07:33

## 2021-01-01 RX ADMIN — BARICITINIB 4 MG: 2 TABLET, FILM COATED ORAL at 20:59

## 2021-01-01 RX ADMIN — BUMETANIDE 0.5 MG: 0.25 INJECTION INTRAMUSCULAR; INTRAVENOUS at 09:14

## 2021-01-01 RX ADMIN — Medication 1 APPLICATION: at 21:36

## 2021-01-01 RX ADMIN — ZINC SULFATE 220 MG (50 MG) CAPSULE 220 MG: CAPSULE at 09:39

## 2021-01-01 RX ADMIN — Medication 1000 UNITS: at 09:38

## 2021-01-01 RX ADMIN — ENOXAPARIN SODIUM 40 MG: 40 INJECTION SUBCUTANEOUS at 01:24

## 2021-01-01 RX ADMIN — NYSTATIN: 100000 POWDER TOPICAL at 21:45

## 2021-01-01 RX ADMIN — BUMETANIDE 0.5 MG: 0.25 INJECTION INTRAMUSCULAR; INTRAVENOUS at 17:00

## 2021-01-01 RX ADMIN — SUCRALFATE 1 G: 1 TABLET ORAL at 22:41

## 2021-01-01 RX ADMIN — ALBUTEROL SULFATE 2 PUFF: 90 AEROSOL, METERED RESPIRATORY (INHALATION) at 11:55

## 2021-01-01 RX ADMIN — MIDAZOLAM HYDROCHLORIDE 3 MG/HR: 5 INJECTION, SOLUTION INTRAMUSCULAR; INTRAVENOUS at 15:45

## 2021-01-01 RX ADMIN — ISOSORBIDE MONONITRATE 30 MG: 30 TABLET, EXTENDED RELEASE ORAL at 09:30

## 2021-01-01 RX ADMIN — BUDESONIDE AND FORMOTEROL FUMARATE DIHYDRATE 2 PUFF: 160; 4.5 AEROSOL RESPIRATORY (INHALATION) at 19:38

## 2021-01-01 RX ADMIN — ALBUTEROL SULFATE 2 PUFF: 90 AEROSOL, METERED RESPIRATORY (INHALATION) at 16:22

## 2021-01-01 RX ADMIN — OXYCODONE HYDROCHLORIDE AND ACETAMINOPHEN 1000 MG: 500 TABLET ORAL at 09:56

## 2021-01-01 RX ADMIN — Medication 1 APPLICATION: at 20:06

## 2021-01-01 RX ADMIN — SUCRALFATE 1 G: 1 TABLET ORAL at 09:39

## 2021-01-01 RX ADMIN — ALBUTEROL SULFATE 2 PUFF: 90 AEROSOL, METERED RESPIRATORY (INHALATION) at 15:55

## 2021-01-01 RX ADMIN — LISINOPRIL 40 MG: 40 TABLET ORAL at 09:04

## 2021-01-01 RX ADMIN — ASPIRIN 81 MG 81 MG: 81 TABLET ORAL at 11:58

## 2021-01-01 RX ADMIN — SCOLOPAMINE TRANSDERMAL SYSTEM 1 PATCH: 1 PATCH, EXTENDED RELEASE TRANSDERMAL at 10:15

## 2021-01-01 RX ADMIN — ALBUTEROL SULFATE 2 PUFF: 90 AEROSOL, METERED RESPIRATORY (INHALATION) at 16:34

## 2021-01-01 RX ADMIN — BUDESONIDE AND FORMOTEROL FUMARATE DIHYDRATE 2 PUFF: 160; 4.5 AEROSOL RESPIRATORY (INHALATION) at 08:31

## 2021-01-01 RX ADMIN — ISOSORBIDE MONONITRATE 30 MG: 30 TABLET, EXTENDED RELEASE ORAL at 11:00

## 2021-01-01 RX ADMIN — DILTIAZEM HYDROCHLORIDE 300 MG: 180 CAPSULE, COATED, EXTENDED RELEASE ORAL at 08:50

## 2021-01-01 RX ADMIN — ALBUTEROL SULFATE 2 PUFF: 90 AEROSOL, METERED RESPIRATORY (INHALATION) at 20:02

## 2021-01-01 RX ADMIN — METOPROLOL TARTRATE 25 MG: 25 TABLET, FILM COATED ORAL at 20:30

## 2021-01-01 RX ADMIN — NYSTATIN: 100000 POWDER TOPICAL at 11:00

## 2021-01-01 RX ADMIN — PROPOFOL 15 MCG/KG/MIN: 10 INJECTION, EMULSION INTRAVENOUS at 04:04

## 2021-01-01 RX ADMIN — ALBUTEROL SULFATE 2 PUFF: 90 AEROSOL, METERED RESPIRATORY (INHALATION) at 10:36

## 2021-01-01 RX ADMIN — DEXAMETHASONE 6 MG: 4 TABLET ORAL at 08:11

## 2021-01-01 RX ADMIN — IPRATROPIUM BROMIDE 2 PUFF: 17 AEROSOL, METERED RESPIRATORY (INHALATION) at 08:07

## 2021-01-01 RX ADMIN — FENTANYL CITRATE 50 MCG: 50 INJECTION INTRAMUSCULAR; INTRAVENOUS at 18:35

## 2021-01-01 RX ADMIN — DEXAMETHASONE SODIUM PHOSPHATE 6 MG: 4 INJECTION, SOLUTION INTRAMUSCULAR; INTRAVENOUS at 20:06

## 2021-01-01 RX ADMIN — SUCRALFATE 1 G: 1 TABLET ORAL at 21:43

## 2021-01-01 RX ADMIN — PANTOPRAZOLE SODIUM 40 MG: 40 TABLET, DELAYED RELEASE ORAL at 09:38

## 2021-01-01 RX ADMIN — METOPROLOL TARTRATE 25 MG: 25 TABLET, FILM COATED ORAL at 23:47

## 2021-01-01 RX ADMIN — ENOXAPARIN SODIUM 40 MG: 40 INJECTION SUBCUTANEOUS at 23:01

## 2021-01-01 RX ADMIN — LATANOPROST 1 DROP: 50 SOLUTION OPHTHALMIC at 21:19

## 2021-01-01 RX ADMIN — FENTANYL CITRATE: 0.05 INJECTION, SOLUTION INTRAMUSCULAR; INTRAVENOUS at 17:08

## 2021-01-01 RX ADMIN — ENOXAPARIN SODIUM 40 MG: 40 INJECTION SUBCUTANEOUS at 12:32

## 2021-01-01 RX ADMIN — ASPIRIN 81 MG 81 MG: 81 TABLET ORAL at 08:37

## 2021-01-01 RX ADMIN — PRAMIPEXOLE DIHYDROCHLORIDE 0.25 MG: 0.12 TABLET ORAL at 20:07

## 2021-01-01 RX ADMIN — MIDAZOLAM HYDROCHLORIDE 10 MG/HR: 5 INJECTION, SOLUTION INTRAMUSCULAR; INTRAVENOUS at 18:26

## 2021-01-01 RX ADMIN — BUMETANIDE 1 MG: 1 TABLET ORAL at 09:38

## 2021-01-01 RX ADMIN — GABAPENTIN 200 MG: 100 CAPSULE ORAL at 20:36

## 2021-01-01 RX ADMIN — ENOXAPARIN SODIUM 40 MG: 40 INJECTION SUBCUTANEOUS at 11:55

## 2021-01-01 RX ADMIN — GABAPENTIN 200 MG: 100 CAPSULE ORAL at 20:31

## 2021-01-01 RX ADMIN — DULOXETINE HYDROCHLORIDE 60 MG: 60 CAPSULE, DELAYED RELEASE ORAL at 09:29

## 2021-01-01 RX ADMIN — IPRATROPIUM BROMIDE 2 PUFF: 17 AEROSOL, METERED RESPIRATORY (INHALATION) at 08:10

## 2021-01-01 RX ADMIN — PANTOPRAZOLE SODIUM 40 MG: 40 TABLET, DELAYED RELEASE ORAL at 11:00

## 2021-01-01 RX ADMIN — BARICITINIB 2 MG: 2 TABLET, FILM COATED ORAL at 08:53

## 2021-01-01 RX ADMIN — DEXAMETHASONE 6 MG: 4 TABLET ORAL at 20:58

## 2021-01-01 RX ADMIN — NYSTATIN 1 APPLICATION: 100000 POWDER TOPICAL at 08:48

## 2021-01-01 RX ADMIN — PROPOFOL 35 MCG/KG/MIN: 10 INJECTION, EMULSION INTRAVENOUS at 03:22

## 2021-01-01 RX ADMIN — IPRATROPIUM BROMIDE 2 PUFF: 17 AEROSOL, METERED RESPIRATORY (INHALATION) at 11:11

## 2021-01-01 RX ADMIN — PROPOFOL 10 MCG/KG/MIN: 10 INJECTION, EMULSION INTRAVENOUS at 05:41

## 2021-01-01 RX ADMIN — BUDESONIDE AND FORMOTEROL FUMARATE DIHYDRATE 2 PUFF: 160; 4.5 AEROSOL RESPIRATORY (INHALATION) at 07:57

## 2021-01-01 RX ADMIN — BUDESONIDE AND FORMOTEROL FUMARATE DIHYDRATE 2 PUFF: 160; 4.5 AEROSOL RESPIRATORY (INHALATION) at 07:42

## 2021-01-01 RX ADMIN — DULOXETINE HYDROCHLORIDE 60 MG: 60 CAPSULE, DELAYED RELEASE ORAL at 08:50

## 2021-01-01 RX ADMIN — ISOSORBIDE MONONITRATE 30 MG: 30 TABLET, EXTENDED RELEASE ORAL at 09:28

## 2021-01-01 RX ADMIN — LATANOPROST 1 DROP: 50 SOLUTION OPHTHALMIC at 20:14

## 2021-01-01 RX ADMIN — METOPROLOL TARTRATE 25 MG: 25 TABLET, FILM COATED ORAL at 09:58

## 2021-01-01 RX ADMIN — SUCRALFATE 1 G: 1 TABLET ORAL at 08:37

## 2021-01-01 RX ADMIN — NYSTATIN 1 APPLICATION: 100000 POWDER TOPICAL at 20:38

## 2021-01-01 RX ADMIN — IPRATROPIUM BROMIDE 2 PUFF: 17 AEROSOL, METERED RESPIRATORY (INHALATION) at 19:39

## 2021-01-01 RX ADMIN — BUMETANIDE 0.5 MG: 0.25 INJECTION INTRAMUSCULAR; INTRAVENOUS at 08:13

## 2021-01-01 RX ADMIN — GABAPENTIN 200 MG: 100 CAPSULE ORAL at 20:01

## 2021-01-01 RX ADMIN — NYSTATIN: 100000 POWDER TOPICAL at 20:14

## 2021-01-01 RX ADMIN — IPRATROPIUM BROMIDE 2 PUFF: 17 AEROSOL, METERED RESPIRATORY (INHALATION) at 13:30

## 2021-01-01 RX ADMIN — BARICITINIB 2 MG: 2 TABLET, FILM COATED ORAL at 11:37

## 2021-01-01 RX ADMIN — SODIUM CHLORIDE, PRESERVATIVE FREE 10 ML: 5 INJECTION INTRAVENOUS at 20:10

## 2021-01-01 RX ADMIN — METOPROLOL TARTRATE 25 MG: 25 TABLET, FILM COATED ORAL at 09:02

## 2021-01-01 RX ADMIN — IPRATROPIUM BROMIDE 2 PUFF: 17 AEROSOL, METERED RESPIRATORY (INHALATION) at 16:34

## 2021-01-01 RX ADMIN — ISOSORBIDE MONONITRATE 30 MG: 30 TABLET, EXTENDED RELEASE ORAL at 08:07

## 2021-01-01 RX ADMIN — ENOXAPARIN SODIUM 40 MG: 40 INJECTION SUBCUTANEOUS at 01:57

## 2021-01-01 RX ADMIN — IPRATROPIUM BROMIDE 2 PUFF: 17 AEROSOL, METERED RESPIRATORY (INHALATION) at 07:56

## 2021-01-01 RX ADMIN — ASPIRIN 81 MG 81 MG: 81 TABLET ORAL at 08:06

## 2021-01-01 RX ADMIN — LORAZEPAM 1 MG: 2 INJECTION INTRAMUSCULAR; INTRAVENOUS at 01:29

## 2021-01-01 RX ADMIN — METOPROLOL TARTRATE 25 MG: 25 TABLET, FILM COATED ORAL at 20:06

## 2021-01-01 RX ADMIN — BUDESONIDE AND FORMOTEROL FUMARATE DIHYDRATE 2 PUFF: 160; 4.5 AEROSOL RESPIRATORY (INHALATION) at 22:31

## 2021-01-01 RX ADMIN — IPRATROPIUM BROMIDE 2 PUFF: 17 AEROSOL, METERED RESPIRATORY (INHALATION) at 16:17

## 2021-01-01 RX ADMIN — PRAVASTATIN SODIUM 20 MG: 20 TABLET ORAL at 21:28

## 2021-01-01 RX ADMIN — METOPROLOL TARTRATE 25 MG: 25 TABLET, FILM COATED ORAL at 21:07

## 2021-01-01 RX ADMIN — PROPOFOL 25 MCG/KG/MIN: 10 INJECTION, EMULSION INTRAVENOUS at 19:44

## 2021-01-01 RX ADMIN — ALBUTEROL SULFATE 2 PUFF: 90 AEROSOL, METERED RESPIRATORY (INHALATION) at 19:39

## 2021-01-01 RX ADMIN — BUDESONIDE AND FORMOTEROL FUMARATE DIHYDRATE 2 PUFF: 160; 4.5 AEROSOL RESPIRATORY (INHALATION) at 20:12

## 2021-01-01 RX ADMIN — METOPROLOL TARTRATE 12.5 MG: 25 TABLET, FILM COATED ORAL at 08:16

## 2021-01-01 RX ADMIN — CEFTRIAXONE SODIUM 1 G: 1 INJECTION, POWDER, FOR SOLUTION INTRAMUSCULAR; INTRAVENOUS at 22:42

## 2021-01-01 RX ADMIN — IPRATROPIUM BROMIDE 2 PUFF: 17 AEROSOL, METERED RESPIRATORY (INHALATION) at 20:01

## 2021-01-01 RX ADMIN — PRAVASTATIN SODIUM 20 MG: 20 TABLET ORAL at 20:06

## 2021-01-01 RX ADMIN — HYDROXYZINE HYDROCHLORIDE 25 MG: 25 TABLET, FILM COATED ORAL at 21:34

## 2021-01-01 RX ADMIN — NYSTATIN: 100000 POWDER TOPICAL at 21:29

## 2021-01-01 RX ADMIN — ALBUTEROL SULFATE 2 PUFF: 90 AEROSOL, METERED RESPIRATORY (INHALATION) at 07:33

## 2021-01-01 RX ADMIN — PROPOFOL 10 MCG/KG/MIN: 10 INJECTION, EMULSION INTRAVENOUS at 12:20

## 2021-01-01 RX ADMIN — SUCRALFATE 1 G: 1 TABLET ORAL at 20:36

## 2021-01-01 RX ADMIN — PRAMIPEXOLE DIHYDROCHLORIDE 0.25 MG: 0.12 TABLET ORAL at 21:13

## 2021-01-01 RX ADMIN — PRAVASTATIN SODIUM 20 MG: 20 TABLET ORAL at 21:08

## 2021-01-01 RX ADMIN — BARICITINIB 2 MG: 2 TABLET, FILM COATED ORAL at 09:14

## 2021-01-01 RX ADMIN — PRAMIPEXOLE DIHYDROCHLORIDE 0.25 MG: 0.12 TABLET ORAL at 01:56

## 2021-01-01 RX ADMIN — METOPROLOL TARTRATE 25 MG: 25 TABLET, FILM COATED ORAL at 20:01

## 2021-01-01 RX ADMIN — ENOXAPARIN SODIUM 40 MG: 40 INJECTION SUBCUTANEOUS at 23:08

## 2021-01-01 RX ADMIN — TRAZODONE HYDROCHLORIDE 100 MG: 100 TABLET ORAL at 22:34

## 2021-01-01 RX ADMIN — POTASSIUM CHLORIDE 10 MEQ: 750 CAPSULE, EXTENDED RELEASE ORAL at 09:39

## 2021-01-01 RX ADMIN — ASPIRIN 81 MG 81 MG: 81 TABLET ORAL at 09:29

## 2021-01-01 RX ADMIN — MIDAZOLAM HYDROCHLORIDE 6 MG/HR: 5 INJECTION, SOLUTION INTRAMUSCULAR; INTRAVENOUS at 04:01

## 2021-01-01 RX ADMIN — FUROSEMIDE 40 MG: 10 INJECTION INTRAMUSCULAR; INTRAVENOUS at 14:57

## 2021-01-01 RX ADMIN — HYDROMORPHONE HYDROCHLORIDE 1 MG: 1 INJECTION, SOLUTION INTRAMUSCULAR; INTRAVENOUS; SUBCUTANEOUS at 09:37

## 2021-01-01 RX ADMIN — BUMETANIDE 0.5 MG: 0.25 INJECTION INTRAMUSCULAR; INTRAVENOUS at 18:21

## 2021-01-01 RX ADMIN — BUDESONIDE AND FORMOTEROL FUMARATE DIHYDRATE 2 PUFF: 160; 4.5 AEROSOL RESPIRATORY (INHALATION) at 19:28

## 2021-01-01 RX ADMIN — LORAZEPAM 1 MG: 2 INJECTION INTRAMUSCULAR; INTRAVENOUS at 21:02

## 2021-01-01 RX ADMIN — PANTOPRAZOLE SODIUM 40 MG: 40 TABLET, DELAYED RELEASE ORAL at 09:57

## 2021-01-01 RX ADMIN — BUDESONIDE AND FORMOTEROL FUMARATE DIHYDRATE 2 PUFF: 160; 4.5 AEROSOL RESPIRATORY (INHALATION) at 07:45

## 2021-01-01 RX ADMIN — Medication 1000 UNITS: at 08:49

## 2021-01-01 RX ADMIN — AZITHROMYCIN 500 MG: 500 INJECTION, POWDER, LYOPHILIZED, FOR SOLUTION INTRAVENOUS at 01:55

## 2021-01-01 RX ADMIN — NYSTATIN: 100000 POWDER TOPICAL at 09:30

## 2021-01-01 RX ADMIN — PRAVASTATIN SODIUM 20 MG: 20 TABLET ORAL at 20:30

## 2021-01-01 RX ADMIN — FENTANYL CITRATE 50 MCG: 50 INJECTION INTRAMUSCULAR; INTRAVENOUS at 19:58

## 2021-01-01 RX ADMIN — METOPROLOL TARTRATE 25 MG: 25 TABLET, FILM COATED ORAL at 22:41

## 2021-01-01 RX ADMIN — DEXAMETHASONE SODIUM PHOSPHATE 6 MG: 4 INJECTION, SOLUTION INTRAMUSCULAR; INTRAVENOUS at 08:14

## 2021-01-01 RX ADMIN — PANTOPRAZOLE SODIUM 40 MG: 40 TABLET, DELAYED RELEASE ORAL at 11:58

## 2021-01-01 RX ADMIN — MIDAZOLAM HYDROCHLORIDE 10 MG/HR: 5 INJECTION, SOLUTION INTRAMUSCULAR; INTRAVENOUS at 17:10

## 2021-01-01 RX ADMIN — SODIUM CHLORIDE, PRESERVATIVE FREE 10 ML: 5 INJECTION INTRAVENOUS at 09:05

## 2021-01-01 RX ADMIN — PRAVASTATIN SODIUM 20 MG: 20 TABLET ORAL at 21:43

## 2021-01-01 RX ADMIN — METOPROLOL TARTRATE 25 MG: 25 TABLET, FILM COATED ORAL at 09:29

## 2021-01-01 RX ADMIN — IPRATROPIUM BROMIDE 2 PUFF: 17 AEROSOL, METERED RESPIRATORY (INHALATION) at 22:28

## 2021-01-01 RX ADMIN — NYSTATIN: 100000 POWDER TOPICAL at 21:21

## 2021-01-01 RX ADMIN — HYDROXYZINE HYDROCHLORIDE 25 MG: 25 TABLET, FILM COATED ORAL at 21:49

## 2021-01-01 RX ADMIN — ASPIRIN 81 MG 81 MG: 81 TABLET ORAL at 08:14

## 2021-01-01 RX ADMIN — NYSTATIN: 100000 POWDER TOPICAL at 21:00

## 2021-01-01 RX ADMIN — BUDESONIDE AND FORMOTEROL FUMARATE DIHYDRATE 2 PUFF: 160; 4.5 AEROSOL RESPIRATORY (INHALATION) at 20:30

## 2021-01-01 RX ADMIN — BUDESONIDE AND FORMOTEROL FUMARATE DIHYDRATE 2 PUFF: 160; 4.5 AEROSOL RESPIRATORY (INHALATION) at 20:47

## 2021-01-01 RX ADMIN — Medication 1000 UNITS: at 09:01

## 2021-01-01 RX ADMIN — PANTOPRAZOLE SODIUM 40 MG: 40 INJECTION, POWDER, FOR SOLUTION INTRAVENOUS at 06:11

## 2021-01-01 RX ADMIN — DEXAMETHASONE SODIUM PHOSPHATE 6 MG: 4 INJECTION, SOLUTION INTRAMUSCULAR; INTRAVENOUS at 21:06

## 2021-01-01 RX ADMIN — ALBUTEROL SULFATE 2 PUFF: 90 AEROSOL, METERED RESPIRATORY (INHALATION) at 12:13

## 2021-01-01 RX ADMIN — METOPROLOL TARTRATE 25 MG: 25 TABLET, FILM COATED ORAL at 08:37

## 2021-01-01 RX ADMIN — ATROPINE SULFATE 2 DROP: 10 SOLUTION OPHTHALMIC at 09:20

## 2021-01-01 RX ADMIN — BARICITINIB 2 MG: 2 TABLET, FILM COATED ORAL at 08:12

## 2021-01-01 RX ADMIN — DULOXETINE HYDROCHLORIDE 60 MG: 60 CAPSULE, DELAYED RELEASE ORAL at 08:02

## 2021-01-01 RX ADMIN — SUCRALFATE 1 G: 1 TABLET ORAL at 08:06

## 2021-01-01 RX ADMIN — SUCRALFATE 1 G: 1 TABLET ORAL at 20:01

## 2021-01-01 RX ADMIN — SODIUM CHLORIDE, PRESERVATIVE FREE 10 ML: 5 INJECTION INTRAVENOUS at 22:16

## 2021-01-01 RX ADMIN — IPRATROPIUM BROMIDE 2 PUFF: 17 AEROSOL, METERED RESPIRATORY (INHALATION) at 16:42

## 2021-01-01 RX ADMIN — GABAPENTIN 200 MG: 100 CAPSULE ORAL at 20:07

## 2021-01-01 RX ADMIN — IOPAMIDOL 68 ML: 755 INJECTION, SOLUTION INTRAVENOUS at 22:15

## 2021-01-01 RX ADMIN — HYDROMORPHONE HYDROCHLORIDE 1 MG: 1 INJECTION, SOLUTION INTRAMUSCULAR; INTRAVENOUS; SUBCUTANEOUS at 10:46

## 2021-01-01 RX ADMIN — SODIUM CHLORIDE, PRESERVATIVE FREE 10 ML: 5 INJECTION INTRAVENOUS at 21:08

## 2021-01-01 RX ADMIN — LATANOPROST 1 DROP: 50 SOLUTION OPHTHALMIC at 20:56

## 2021-01-01 RX ADMIN — FENTANYL CITRATE 50 MCG: 50 INJECTION INTRAMUSCULAR; INTRAVENOUS at 08:51

## 2021-01-01 RX ADMIN — BUDESONIDE AND FORMOTEROL FUMARATE DIHYDRATE 2 PUFF: 160; 4.5 AEROSOL RESPIRATORY (INHALATION) at 07:21

## 2021-01-01 RX ADMIN — FENTANYL CITRATE 50 MCG: 50 INJECTION INTRAMUSCULAR; INTRAVENOUS at 13:02

## 2021-01-01 RX ADMIN — PRAMIPEXOLE DIHYDROCHLORIDE 0.25 MG: 0.12 TABLET ORAL at 20:58

## 2021-01-01 RX ADMIN — SUCRALFATE 1 G: 1 TABLET ORAL at 11:58

## 2021-01-01 RX ADMIN — ETOMIDATE 20 MG: 2 INJECTION, SOLUTION INTRAVENOUS at 14:35

## 2021-01-01 RX ADMIN — SUCRALFATE 1 G: 1 TABLET ORAL at 20:31

## 2021-01-01 RX ADMIN — METOPROLOL TARTRATE 25 MG: 25 TABLET, FILM COATED ORAL at 20:58

## 2021-01-01 RX ADMIN — ALBUTEROL SULFATE 2 PUFF: 90 AEROSOL, METERED RESPIRATORY (INHALATION) at 16:42

## 2021-01-01 RX ADMIN — DILTIAZEM HYDROCHLORIDE 300 MG: 180 CAPSULE, COATED, EXTENDED RELEASE ORAL at 09:30

## 2021-01-01 RX ADMIN — ASPIRIN 81 MG 81 MG: 81 TABLET ORAL at 08:49

## 2021-01-01 RX ADMIN — FENTANYL CITRATE 50 MCG: 50 INJECTION INTRAMUSCULAR; INTRAVENOUS at 11:33

## 2021-01-01 RX ADMIN — DULOXETINE HYDROCHLORIDE 60 MG: 60 CAPSULE, DELAYED RELEASE ORAL at 09:28

## 2021-01-01 RX ADMIN — ENOXAPARIN SODIUM 40 MG: 40 INJECTION SUBCUTANEOUS at 11:48

## 2021-01-01 RX ADMIN — ENOXAPARIN SODIUM 40 MG: 40 INJECTION SUBCUTANEOUS at 00:28

## 2021-01-01 RX ADMIN — LORAZEPAM 1 MG: 2 INJECTION INTRAMUSCULAR; INTRAVENOUS at 04:56

## 2021-01-01 RX ADMIN — Medication 1 APPLICATION: at 08:26

## 2021-01-01 RX ADMIN — GABAPENTIN 200 MG: 100 CAPSULE ORAL at 22:41

## 2021-01-01 RX ADMIN — BUDESONIDE AND FORMOTEROL FUMARATE DIHYDRATE 2 PUFF: 160; 4.5 AEROSOL RESPIRATORY (INHALATION) at 22:28

## 2021-01-01 RX ADMIN — IPRATROPIUM BROMIDE 2 PUFF: 17 AEROSOL, METERED RESPIRATORY (INHALATION) at 22:40

## 2021-01-01 RX ADMIN — ALBUTEROL SULFATE 2 PUFF: 90 AEROSOL, METERED RESPIRATORY (INHALATION) at 12:20

## 2021-01-01 RX ADMIN — DEXAMETHASONE 6 MG: 2 TABLET ORAL at 08:49

## 2021-01-01 RX ADMIN — LISINOPRIL 40 MG: 40 TABLET ORAL at 09:39

## 2021-01-01 RX ADMIN — BUMETANIDE 0.5 MG: 0.25 INJECTION INTRAMUSCULAR; INTRAVENOUS at 09:04

## 2021-01-01 RX ADMIN — SUCRALFATE 1 G: 1 TABLET ORAL at 09:14

## 2021-01-01 RX ADMIN — SODIUM CHLORIDE, PRESERVATIVE FREE 10 ML: 5 INJECTION INTRAVENOUS at 08:27

## 2021-01-01 RX ADMIN — BUMETANIDE 0.5 MG: 0.25 INJECTION INTRAMUSCULAR; INTRAVENOUS at 08:37

## 2021-01-01 RX ADMIN — DEXAMETHASONE 6 MG: 2 TABLET ORAL at 09:38

## 2021-01-01 RX ADMIN — LISINOPRIL 40 MG: 40 TABLET ORAL at 11:00

## 2021-01-01 RX ADMIN — SODIUM CHLORIDE, PRESERVATIVE FREE 10 ML: 5 INJECTION INTRAVENOUS at 20:32

## 2021-01-01 RX ADMIN — PROPOFOL 25 MCG/KG/MIN: 10 INJECTION, EMULSION INTRAVENOUS at 06:39

## 2021-01-01 RX ADMIN — IPRATROPIUM BROMIDE 2 PUFF: 17 AEROSOL, METERED RESPIRATORY (INHALATION) at 10:50

## 2021-01-01 RX ADMIN — FENTANYL CITRATE: 0.05 INJECTION, SOLUTION INTRAMUSCULAR; INTRAVENOUS at 07:10

## 2021-01-01 RX ADMIN — ZINC SULFATE 220 MG (50 MG) CAPSULE 220 MG: CAPSULE at 08:07

## 2021-01-01 RX ADMIN — Medication 0.02 MCG/KG/MIN: at 22:36

## 2021-01-01 RX ADMIN — IPRATROPIUM BROMIDE 2 PUFF: 17 AEROSOL, METERED RESPIRATORY (INHALATION) at 22:36

## 2021-01-01 RX ADMIN — SUCRALFATE 1 G: 1 TABLET ORAL at 08:01

## 2021-01-01 RX ADMIN — ENOXAPARIN SODIUM 40 MG: 40 INJECTION SUBCUTANEOUS at 11:58

## 2021-01-01 RX ADMIN — HALOPERIDOL LACTATE 2 MG: 5 INJECTION, SOLUTION INTRAMUSCULAR at 01:08

## 2021-01-01 RX ADMIN — DILTIAZEM HYDROCHLORIDE 300 MG: 180 CAPSULE, COATED, EXTENDED RELEASE ORAL at 09:39

## 2021-01-01 RX ADMIN — ENOXAPARIN SODIUM 40 MG: 40 INJECTION SUBCUTANEOUS at 12:55

## 2021-01-01 RX ADMIN — IPRATROPIUM BROMIDE 2 PUFF: 17 AEROSOL, METERED RESPIRATORY (INHALATION) at 10:34

## 2021-01-01 RX ADMIN — PRAVASTATIN SODIUM 20 MG: 20 TABLET ORAL at 20:09

## 2021-01-01 RX ADMIN — SUCRALFATE 1 G: 1 TABLET ORAL at 08:27

## 2021-01-01 RX ADMIN — BUMETANIDE 1 MG: 1 TABLET ORAL at 08:06

## 2021-01-01 RX ADMIN — PANTOPRAZOLE SODIUM 40 MG: 40 TABLET, DELAYED RELEASE ORAL at 09:02

## 2021-01-01 RX ADMIN — Medication 1 APPLICATION: at 20:10

## 2021-01-01 RX ADMIN — HYDROXYZINE HYDROCHLORIDE 25 MG: 25 TABLET, FILM COATED ORAL at 11:32

## 2021-01-01 RX ADMIN — GABAPENTIN 200 MG: 100 CAPSULE ORAL at 20:30

## 2021-01-01 RX ADMIN — LATANOPROST 1 DROP: 50 SOLUTION OPHTHALMIC at 20:35

## 2021-01-01 RX ADMIN — NYSTATIN: 100000 POWDER TOPICAL at 06:22

## 2021-01-01 RX ADMIN — DEXAMETHASONE SODIUM PHOSPHATE 6 MG: 4 INJECTION, SOLUTION INTRAMUSCULAR; INTRAVENOUS at 09:55

## 2021-01-01 RX ADMIN — IPRATROPIUM BROMIDE 2 PUFF: 17 AEROSOL, METERED RESPIRATORY (INHALATION) at 19:28

## 2021-01-01 RX ADMIN — LORAZEPAM 1 MG: 2 INJECTION INTRAMUSCULAR; INTRAVENOUS at 06:05

## 2021-01-01 RX ADMIN — BUDESONIDE AND FORMOTEROL FUMARATE DIHYDRATE 2 PUFF: 160; 4.5 AEROSOL RESPIRATORY (INHALATION) at 20:10

## 2021-01-01 RX ADMIN — ISOSORBIDE MONONITRATE 30 MG: 30 TABLET, EXTENDED RELEASE ORAL at 09:39

## 2021-01-01 RX ADMIN — PRAVASTATIN SODIUM 20 MG: 20 TABLET ORAL at 22:17

## 2021-01-01 RX ADMIN — IPRATROPIUM BROMIDE 2 PUFF: 17 AEROSOL, METERED RESPIRATORY (INHALATION) at 08:40

## 2021-01-01 RX ADMIN — ENOXAPARIN SODIUM 40 MG: 40 INJECTION SUBCUTANEOUS at 23:57

## 2021-01-01 RX ADMIN — METOPROLOL TARTRATE 25 MG: 25 TABLET, FILM COATED ORAL at 09:39

## 2021-01-01 RX ADMIN — IPRATROPIUM BROMIDE 2 PUFF: 17 AEROSOL, METERED RESPIRATORY (INHALATION) at 10:49

## 2021-01-01 RX ADMIN — BUDESONIDE AND FORMOTEROL FUMARATE DIHYDRATE 2 PUFF: 160; 4.5 AEROSOL RESPIRATORY (INHALATION) at 08:08

## 2021-01-01 RX ADMIN — ALBUTEROL SULFATE 2 PUFF: 90 AEROSOL, METERED RESPIRATORY (INHALATION) at 13:44

## 2021-01-01 RX ADMIN — Medication 1 APPLICATION: at 21:45

## 2021-01-01 RX ADMIN — LISINOPRIL 40 MG: 40 TABLET ORAL at 09:21

## 2021-01-01 RX ADMIN — BUDESONIDE AND FORMOTEROL FUMARATE DIHYDRATE 2 PUFF: 160; 4.5 AEROSOL RESPIRATORY (INHALATION) at 22:40

## 2021-01-01 RX ADMIN — PROPOFOL 10 MCG/KG/MIN: 10 INJECTION, EMULSION INTRAVENOUS at 22:23

## 2021-01-01 RX ADMIN — ENOXAPARIN SODIUM 40 MG: 40 INJECTION SUBCUTANEOUS at 11:28

## 2021-01-01 RX ADMIN — SUCRALFATE 1 G: 1 TABLET ORAL at 21:34

## 2021-01-01 RX ADMIN — ALBUTEROL SULFATE 2 PUFF: 90 AEROSOL, METERED RESPIRATORY (INHALATION) at 08:40

## 2021-01-01 RX ADMIN — ASPIRIN 81 MG 81 MG: 81 TABLET ORAL at 08:02

## 2021-01-01 RX ADMIN — PRAMIPEXOLE DIHYDROCHLORIDE 0.25 MG: 0.12 TABLET ORAL at 20:09

## 2021-01-01 RX ADMIN — PANTOPRAZOLE SODIUM 40 MG: 40 INJECTION, POWDER, FOR SOLUTION INTRAVENOUS at 06:44

## 2021-01-01 RX ADMIN — TRAZODONE HYDROCHLORIDE 100 MG: 100 TABLET ORAL at 20:53

## 2021-01-01 RX ADMIN — Medication 1 APPLICATION: at 21:00

## 2021-01-01 RX ADMIN — DULOXETINE HYDROCHLORIDE 60 MG: 60 CAPSULE, DELAYED RELEASE ORAL at 09:57

## 2021-01-01 RX ADMIN — PROPOFOL 5 MCG/KG/MIN: 10 INJECTION, EMULSION INTRAVENOUS at 12:00

## 2021-01-01 RX ADMIN — DEXAMETHASONE 6 MG: 4 TABLET ORAL at 09:57

## 2021-01-01 RX ADMIN — MIDAZOLAM HYDROCHLORIDE 4 MG/HR: 5 INJECTION, SOLUTION INTRAMUSCULAR; INTRAVENOUS at 23:31

## 2021-01-01 RX ADMIN — BUMETANIDE 1 MG: 1 TABLET ORAL at 11:58

## 2021-01-01 RX ADMIN — ALBUTEROL SULFATE 2 PUFF: 90 AEROSOL, METERED RESPIRATORY (INHALATION) at 09:58

## 2021-01-01 RX ADMIN — SUCRALFATE 1 G: 1 TABLET ORAL at 08:14

## 2021-01-01 RX ADMIN — OXYCODONE HYDROCHLORIDE AND ACETAMINOPHEN 1000 MG: 500 TABLET ORAL at 09:02

## 2021-01-01 RX ADMIN — FENTANYL CITRATE 50 MCG: 50 INJECTION INTRAMUSCULAR; INTRAVENOUS at 17:39

## 2021-01-01 RX ADMIN — IPRATROPIUM BROMIDE 2 PUFF: 17 AEROSOL, METERED RESPIRATORY (INHALATION) at 20:10

## 2021-01-01 RX ADMIN — BUMETANIDE 1 MG: 1 TABLET ORAL at 08:49

## 2021-01-01 RX ADMIN — ALBUTEROL SULFATE 2 PUFF: 90 AEROSOL, METERED RESPIRATORY (INHALATION) at 22:31

## 2021-01-01 RX ADMIN — ENOXAPARIN SODIUM 40 MG: 40 INJECTION SUBCUTANEOUS at 01:42

## 2021-01-01 RX ADMIN — ASPIRIN 81 MG 81 MG: 81 TABLET ORAL at 09:39

## 2021-01-01 RX ADMIN — BUMETANIDE 0.5 MG: 0.25 INJECTION INTRAMUSCULAR; INTRAVENOUS at 20:46

## 2021-01-01 RX ADMIN — BUMETANIDE 1 MG: 1 TABLET ORAL at 09:56

## 2021-01-01 RX ADMIN — PRAVASTATIN SODIUM 20 MG: 20 TABLET ORAL at 20:36

## 2021-01-01 RX ADMIN — SODIUM CHLORIDE, PRESERVATIVE FREE 10 ML: 5 INJECTION INTRAVENOUS at 20:30

## 2021-01-01 RX ADMIN — BUMETANIDE 0.5 MG: 0.25 INJECTION INTRAMUSCULAR; INTRAVENOUS at 20:07

## 2021-01-01 RX ADMIN — DEXAMETHASONE SODIUM PHOSPHATE 6 MG: 4 INJECTION, SOLUTION INTRAMUSCULAR; INTRAVENOUS at 08:23

## 2021-01-01 RX ADMIN — NYSTATIN: 100000 POWDER TOPICAL at 09:31

## 2021-01-01 RX ADMIN — PRAMIPEXOLE DIHYDROCHLORIDE 0.25 MG: 0.12 TABLET ORAL at 22:33

## 2021-01-01 RX ADMIN — DEXAMETHASONE SODIUM PHOSPHATE 6 MG: 4 INJECTION, SOLUTION INTRAMUSCULAR; INTRAVENOUS at 08:27

## 2021-01-01 RX ADMIN — MIDAZOLAM HYDROCHLORIDE 6 MG/HR: 5 INJECTION, SOLUTION INTRAMUSCULAR; INTRAVENOUS at 23:58

## 2021-01-01 RX ADMIN — DILTIAZEM HYDROCHLORIDE 300 MG: 180 CAPSULE, COATED, EXTENDED RELEASE ORAL at 09:58

## 2021-01-01 RX ADMIN — PRAMIPEXOLE DIHYDROCHLORIDE 0.25 MG: 0.12 TABLET ORAL at 20:31

## 2021-01-01 RX ADMIN — HYDROCODONE BITARTRATE AND ACETAMINOPHEN 1 TABLET: 10; 325 TABLET ORAL at 21:43

## 2021-01-01 RX ADMIN — LORAZEPAM 1 MG: 2 INJECTION INTRAMUSCULAR; INTRAVENOUS at 03:43

## 2021-01-01 RX ADMIN — SUCRALFATE 1 G: 1 TABLET ORAL at 21:08

## 2021-01-01 RX ADMIN — BARICITINIB 2 MG: 2 TABLET, FILM COATED ORAL at 10:14

## 2021-01-01 RX ADMIN — LATANOPROST 1 DROP: 50 SOLUTION OPHTHALMIC at 21:36

## 2021-01-01 RX ADMIN — BUMETANIDE 0.5 MG: 0.25 INJECTION INTRAMUSCULAR; INTRAVENOUS at 17:11

## 2021-01-01 RX ADMIN — IPRATROPIUM BROMIDE 2 PUFF: 17 AEROSOL, METERED RESPIRATORY (INHALATION) at 10:52

## 2021-01-01 RX ADMIN — SODIUM CHLORIDE, PRESERVATIVE FREE 10 ML: 5 INJECTION INTRAVENOUS at 09:12

## 2021-01-01 RX ADMIN — LATANOPROST 1 DROP: 50 SOLUTION OPHTHALMIC at 20:06

## 2021-01-01 RX ADMIN — BUDESONIDE AND FORMOTEROL FUMARATE DIHYDRATE 2 PUFF: 160; 4.5 AEROSOL RESPIRATORY (INHALATION) at 12:23

## 2021-01-01 RX ADMIN — MIDAZOLAM HYDROCHLORIDE 4 MG/HR: 5 INJECTION, SOLUTION INTRAMUSCULAR; INTRAVENOUS at 07:46

## 2021-01-01 RX ADMIN — IPRATROPIUM BROMIDE 2 PUFF: 17 AEROSOL, METERED RESPIRATORY (INHALATION) at 09:58

## 2021-01-01 RX ADMIN — LATANOPROST 1 DROP: 50 SOLUTION OPHTHALMIC at 20:30

## 2021-01-01 RX ADMIN — PROPOFOL 15 MCG/KG/MIN: 10 INJECTION, EMULSION INTRAVENOUS at 06:12

## 2021-01-01 RX ADMIN — LISINOPRIL 40 MG: 40 TABLET ORAL at 08:02

## 2021-01-01 RX ADMIN — SUCRALFATE 1 G: 1 TABLET ORAL at 20:14

## 2021-01-01 RX ADMIN — PANTOPRAZOLE SODIUM 40 MG: 40 INJECTION, POWDER, FOR SOLUTION INTRAVENOUS at 06:04

## 2021-01-01 RX ADMIN — PRAVASTATIN SODIUM 20 MG: 20 TABLET ORAL at 20:31

## 2021-01-01 RX ADMIN — LISINOPRIL 40 MG: 40 TABLET ORAL at 08:37

## 2021-01-01 RX ADMIN — MIDAZOLAM HYDROCHLORIDE 6 MG/HR: 5 INJECTION, SOLUTION INTRAMUSCULAR; INTRAVENOUS at 14:41

## 2021-01-01 RX ADMIN — BUMETANIDE 0.5 MG: 0.25 INJECTION INTRAMUSCULAR; INTRAVENOUS at 18:43

## 2021-01-01 RX ADMIN — Medication 1 APPLICATION: at 20:21

## 2021-01-01 RX ADMIN — LORAZEPAM 1 MG: 2 INJECTION INTRAMUSCULAR; INTRAVENOUS at 16:10

## 2021-01-01 RX ADMIN — PRAMIPEXOLE DIHYDROCHLORIDE 0.25 MG: 0.12 TABLET ORAL at 21:43

## 2021-01-01 RX ADMIN — ALBUTEROL SULFATE 2 PUFF: 90 AEROSOL, METERED RESPIRATORY (INHALATION) at 08:10

## 2021-01-01 RX ADMIN — IPRATROPIUM BROMIDE 2 PUFF: 17 AEROSOL, METERED RESPIRATORY (INHALATION) at 15:48

## 2021-01-01 RX ADMIN — LATANOPROST 1 DROP: 50 SOLUTION OPHTHALMIC at 20:59

## 2021-01-01 RX ADMIN — BUDESONIDE AND FORMOTEROL FUMARATE DIHYDRATE 2 PUFF: 160; 4.5 AEROSOL RESPIRATORY (INHALATION) at 06:51

## 2021-01-01 RX ADMIN — SODIUM CHLORIDE, PRESERVATIVE FREE 10 ML: 5 INJECTION INTRAVENOUS at 08:15

## 2021-01-01 RX ADMIN — DULOXETINE HYDROCHLORIDE 60 MG: 60 CAPSULE, DELAYED RELEASE ORAL at 08:06

## 2021-01-01 RX ADMIN — BUMETANIDE 1 MG: 1 TABLET ORAL at 09:02

## 2021-01-01 RX ADMIN — FENTANYL CITRATE 25 MCG: 50 INJECTION INTRAMUSCULAR; INTRAVENOUS at 23:26

## 2021-01-01 RX ADMIN — SUCRALFATE 1 G: 1 TABLET ORAL at 20:06

## 2021-01-01 RX ADMIN — DEXAMETHASONE SODIUM PHOSPHATE 6 MG: 4 INJECTION, SOLUTION INTRAMUSCULAR; INTRAVENOUS at 20:01

## 2021-01-01 RX ADMIN — IPRATROPIUM BROMIDE 2 PUFF: 17 AEROSOL, METERED RESPIRATORY (INHALATION) at 10:36

## 2021-01-01 RX ADMIN — BUMETANIDE 0.5 MG: 0.25 INJECTION INTRAMUSCULAR; INTRAVENOUS at 08:27

## 2021-01-01 RX ADMIN — GABAPENTIN 200 MG: 100 CAPSULE ORAL at 20:06

## 2021-01-01 RX ADMIN — METOPROLOL TARTRATE 25 MG: 25 TABLET, FILM COATED ORAL at 08:49

## 2021-01-01 RX ADMIN — ISOSORBIDE MONONITRATE 30 MG: 30 TABLET, EXTENDED RELEASE ORAL at 08:50

## 2021-01-01 RX ADMIN — PROPOFOL 15 MCG/KG/MIN: 10 INJECTION, EMULSION INTRAVENOUS at 10:33

## 2021-01-01 RX ADMIN — METOPROLOL TARTRATE 25 MG: 25 TABLET, FILM COATED ORAL at 20:55

## 2021-01-01 RX ADMIN — ACETAMINOPHEN 650 MG: 325 TABLET, FILM COATED ORAL at 20:06

## 2021-01-01 RX ADMIN — ENOXAPARIN SODIUM 40 MG: 40 INJECTION SUBCUTANEOUS at 12:20

## 2021-01-01 RX ADMIN — BUDESONIDE AND FORMOTEROL FUMARATE DIHYDRATE 2 PUFF: 160; 4.5 AEROSOL RESPIRATORY (INHALATION) at 21:33

## 2021-01-01 RX ADMIN — LATANOPROST 1 DROP: 50 SOLUTION OPHTHALMIC at 20:21

## 2021-01-01 RX ADMIN — PANTOPRAZOLE SODIUM 40 MG: 40 INJECTION, POWDER, FOR SOLUTION INTRAVENOUS at 09:04

## 2021-01-01 RX ADMIN — ZINC SULFATE 220 MG (50 MG) CAPSULE 220 MG: CAPSULE at 11:58

## 2021-01-01 RX ADMIN — PRAVASTATIN SODIUM 20 MG: 20 TABLET ORAL at 20:10

## 2021-01-01 RX ADMIN — SUCRALFATE 1 G: 1 TABLET ORAL at 08:13

## 2021-01-01 RX ADMIN — BUDESONIDE AND FORMOTEROL FUMARATE DIHYDRATE 2 PUFF: 160; 4.5 AEROSOL RESPIRATORY (INHALATION) at 07:30

## 2021-01-01 RX ADMIN — IPRATROPIUM BROMIDE 2 PUFF: 17 AEROSOL, METERED RESPIRATORY (INHALATION) at 21:33

## 2021-01-01 RX ADMIN — BUDESONIDE AND FORMOTEROL FUMARATE DIHYDRATE 2 PUFF: 160; 4.5 AEROSOL RESPIRATORY (INHALATION) at 19:39

## 2021-01-01 RX ADMIN — LISINOPRIL 40 MG: 40 TABLET ORAL at 11:58

## 2021-01-01 RX ADMIN — PROPOFOL 10 MCG/KG/MIN: 10 INJECTION, EMULSION INTRAVENOUS at 10:15

## 2021-01-01 RX ADMIN — NYSTATIN: 100000 POWDER TOPICAL at 09:13

## 2021-01-01 RX ADMIN — DEXAMETHASONE SODIUM PHOSPHATE 6 MG: 4 INJECTION, SOLUTION INTRAMUSCULAR; INTRAVENOUS at 09:04

## 2021-01-01 RX ADMIN — IPRATROPIUM BROMIDE 2 PUFF: 17 AEROSOL, METERED RESPIRATORY (INHALATION) at 07:40

## 2021-01-01 RX ADMIN — POTASSIUM CHLORIDE 10 MEQ: 750 CAPSULE, EXTENDED RELEASE ORAL at 09:29

## 2021-01-01 RX ADMIN — SUCRALFATE 1 G: 1 TABLET ORAL at 20:58

## 2021-01-01 RX ADMIN — ENOXAPARIN SODIUM 40 MG: 40 INJECTION SUBCUTANEOUS at 01:31

## 2021-01-01 RX ADMIN — DILTIAZEM HYDROCHLORIDE 300 MG: 180 CAPSULE, COATED, EXTENDED RELEASE ORAL at 09:38

## 2021-01-01 RX ADMIN — FENTANYL CITRATE 50 MCG: 50 INJECTION INTRAMUSCULAR; INTRAVENOUS at 15:19

## 2021-01-01 RX ADMIN — PRAVASTATIN SODIUM 20 MG: 20 TABLET ORAL at 21:18

## 2021-01-01 RX ADMIN — BARICITINIB 2 MG: 2 TABLET, FILM COATED ORAL at 08:46

## 2021-01-01 RX ADMIN — FENTANYL CITRATE: 0.05 INJECTION, SOLUTION INTRAMUSCULAR; INTRAVENOUS at 13:12

## 2021-01-01 RX ADMIN — IPRATROPIUM BROMIDE 2 PUFF: 17 AEROSOL, METERED RESPIRATORY (INHALATION) at 20:02

## 2021-01-01 RX ADMIN — GABAPENTIN 200 MG: 100 CAPSULE ORAL at 21:43

## 2021-01-01 RX ADMIN — DEXAMETHASONE SODIUM PHOSPHATE 6 MG: 4 INJECTION, SOLUTION INTRAMUSCULAR; INTRAVENOUS at 20:35

## 2021-01-01 RX ADMIN — PROPOFOL 25 MCG/KG/MIN: 10 INJECTION, EMULSION INTRAVENOUS at 22:29

## 2021-01-01 RX ADMIN — PRAVASTATIN SODIUM 20 MG: 20 TABLET ORAL at 22:41

## 2021-01-01 RX ADMIN — SODIUM CHLORIDE, PRESERVATIVE FREE 10 ML: 5 INJECTION INTRAVENOUS at 20:46

## 2021-01-01 RX ADMIN — Medication 1000 UNITS: at 11:00

## 2021-01-01 RX ADMIN — PROPOFOL 5.09 MCG/KG/MIN: 10 INJECTION, EMULSION INTRAVENOUS at 23:33

## 2021-01-01 RX ADMIN — DEXAMETHASONE 6 MG: 2 TABLET ORAL at 09:02

## 2021-01-01 RX ADMIN — ASPIRIN 81 MG 81 MG: 81 TABLET ORAL at 08:27

## 2021-01-01 RX ADMIN — ALBUTEROL SULFATE 2 PUFF: 90 AEROSOL, METERED RESPIRATORY (INHALATION) at 22:36

## 2021-01-01 RX ADMIN — POTASSIUM CHLORIDE 10 MEQ: 750 CAPSULE, EXTENDED RELEASE ORAL at 09:02

## 2021-01-01 RX ADMIN — ENOXAPARIN SODIUM 40 MG: 40 INJECTION SUBCUTANEOUS at 12:04

## 2021-01-01 RX ADMIN — IPRATROPIUM BROMIDE 2 PUFF: 17 AEROSOL, METERED RESPIRATORY (INHALATION) at 20:03

## 2021-01-01 RX ADMIN — MIDAZOLAM HYDROCHLORIDE 10 MG/HR: 5 INJECTION, SOLUTION INTRAMUSCULAR; INTRAVENOUS at 05:51

## 2021-01-01 RX ADMIN — SUCRALFATE 1 G: 1 TABLET ORAL at 20:54

## 2021-01-01 RX ADMIN — ASPIRIN 81 MG 81 MG: 81 TABLET ORAL at 11:00

## 2021-01-01 RX ADMIN — NYSTATIN: 100000 POWDER TOPICAL at 12:01

## 2021-01-01 RX ADMIN — PROPOFOL 10 MCG/KG/MIN: 10 INJECTION, EMULSION INTRAVENOUS at 20:34

## 2021-01-01 RX ADMIN — NYSTATIN: 100000 POWDER TOPICAL at 08:45

## 2021-01-01 RX ADMIN — SUCRALFATE 1 G: 1 TABLET ORAL at 22:18

## 2021-01-01 RX ADMIN — POTASSIUM CHLORIDE 10 MEQ: 750 CAPSULE, EXTENDED RELEASE ORAL at 09:57

## 2021-01-01 RX ADMIN — BUMETANIDE 0.5 MG: 0.25 INJECTION INTRAMUSCULAR; INTRAVENOUS at 18:06

## 2021-01-01 RX ADMIN — IPRATROPIUM BROMIDE 2 PUFF: 17 AEROSOL, METERED RESPIRATORY (INHALATION) at 12:13

## 2021-01-01 RX ADMIN — IPRATROPIUM BROMIDE 2 PUFF: 17 AEROSOL, METERED RESPIRATORY (INHALATION) at 22:25

## 2021-01-01 RX ADMIN — MIDAZOLAM HYDROCHLORIDE 8 MG/HR: 5 INJECTION, SOLUTION INTRAMUSCULAR; INTRAVENOUS at 07:34

## 2021-01-01 RX ADMIN — ACETAMINOPHEN 650 MG: 325 TABLET, FILM COATED ORAL at 12:14

## 2021-01-01 RX ADMIN — LISINOPRIL 40 MG: 40 TABLET ORAL at 09:57

## 2021-01-01 RX ADMIN — PROPOFOL 15 MCG/KG/MIN: 10 INJECTION, EMULSION INTRAVENOUS at 10:41

## 2021-01-01 RX ADMIN — CEFTRIAXONE SODIUM 1 G: 1 INJECTION, POWDER, FOR SOLUTION INTRAMUSCULAR; INTRAVENOUS at 21:43

## 2021-01-01 RX ADMIN — HYDROMORPHONE HYDROCHLORIDE 1 MG: 1 INJECTION, SOLUTION INTRAMUSCULAR; INTRAVENOUS; SUBCUTANEOUS at 06:05

## 2021-01-01 RX ADMIN — DULOXETINE HYDROCHLORIDE 60 MG: 60 CAPSULE, DELAYED RELEASE ORAL at 08:27

## 2021-01-01 RX ADMIN — ISOSORBIDE MONONITRATE 30 MG: 30 TABLET, EXTENDED RELEASE ORAL at 09:02

## 2021-01-01 RX ADMIN — PROPOFOL 20 MCG/KG/MIN: 10 INJECTION, EMULSION INTRAVENOUS at 22:23

## 2021-01-01 RX ADMIN — ENOXAPARIN SODIUM 40 MG: 40 INJECTION SUBCUTANEOUS at 20:13

## 2021-01-01 RX ADMIN — SUCRALFATE 1 G: 1 TABLET ORAL at 09:57

## 2021-01-01 RX ADMIN — IPRATROPIUM BROMIDE 2 PUFF: 17 AEROSOL, METERED RESPIRATORY (INHALATION) at 15:54

## 2021-01-01 RX ADMIN — BUDESONIDE AND FORMOTEROL FUMARATE DIHYDRATE 2 PUFF: 160; 4.5 AEROSOL RESPIRATORY (INHALATION) at 22:36

## 2021-01-01 RX ADMIN — LORAZEPAM 1 MG: 2 INJECTION INTRAMUSCULAR; INTRAVENOUS at 15:19

## 2021-01-01 RX ADMIN — ISOSORBIDE MONONITRATE 30 MG: 30 TABLET, EXTENDED RELEASE ORAL at 09:57

## 2021-01-01 RX ADMIN — Medication 1000 UNITS: at 09:29

## 2021-01-01 RX ADMIN — IPRATROPIUM BROMIDE 2 PUFF: 17 AEROSOL, METERED RESPIRATORY (INHALATION) at 11:51

## 2021-01-01 RX ADMIN — LORAZEPAM 1 MG: 2 INJECTION INTRAMUSCULAR; INTRAVENOUS at 08:17

## 2021-01-01 RX ADMIN — PRAMIPEXOLE DIHYDROCHLORIDE 0.25 MG: 0.12 TABLET ORAL at 21:34

## 2021-01-01 RX ADMIN — ENOXAPARIN SODIUM 40 MG: 40 INJECTION SUBCUTANEOUS at 13:48

## 2021-01-01 RX ADMIN — METOPROLOL TARTRATE 25 MG: 25 TABLET, FILM COATED ORAL at 09:38

## 2021-01-01 RX ADMIN — ALBUTEROL SULFATE 2 PUFF: 90 AEROSOL, METERED RESPIRATORY (INHALATION) at 16:54

## 2021-01-01 RX ADMIN — NYSTATIN: 100000 POWDER TOPICAL at 08:51

## 2021-01-01 RX ADMIN — CEFTRIAXONE SODIUM 1 G: 1 INJECTION, POWDER, FOR SOLUTION INTRAMUSCULAR; INTRAVENOUS at 21:09

## 2021-01-01 RX ADMIN — ALBUTEROL SULFATE 2 PUFF: 90 AEROSOL, METERED RESPIRATORY (INHALATION) at 20:30

## 2021-01-01 RX ADMIN — POLYETHYLENE GLYCOL 3350 1 BOTTLE: 17 POWDER, FOR SOLUTION ORAL at 17:11

## 2021-01-01 RX ADMIN — MIDAZOLAM HYDROCHLORIDE 8 MG/HR: 5 INJECTION, SOLUTION INTRAMUSCULAR; INTRAVENOUS at 23:28

## 2021-01-01 RX ADMIN — LATANOPROST 1 DROP: 50 SOLUTION OPHTHALMIC at 21:29

## 2021-01-01 RX ADMIN — SODIUM CHLORIDE, PRESERVATIVE FREE 10 ML: 5 INJECTION INTRAVENOUS at 20:06

## 2021-01-01 RX ADMIN — ALBUTEROL SULFATE 2 PUFF: 90 AEROSOL, METERED RESPIRATORY (INHALATION) at 11:11

## 2021-01-01 RX ADMIN — DULOXETINE HYDROCHLORIDE 60 MG: 60 CAPSULE, DELAYED RELEASE ORAL at 11:57

## 2021-01-01 RX ADMIN — MIDAZOLAM HYDROCHLORIDE 3 MG/HR: 5 INJECTION, SOLUTION INTRAMUSCULAR; INTRAVENOUS at 06:38

## 2021-01-01 RX ADMIN — LORAZEPAM 1 MG: 2 INJECTION INTRAMUSCULAR; INTRAVENOUS at 13:02

## 2021-01-01 RX ADMIN — Medication 1 APPLICATION: at 08:49

## 2021-01-01 RX ADMIN — PRAMIPEXOLE DIHYDROCHLORIDE 0.25 MG: 0.12 TABLET ORAL at 21:29

## 2021-01-01 RX ADMIN — LORAZEPAM 1 MG: 2 INJECTION INTRAMUSCULAR; INTRAVENOUS at 10:46

## 2021-01-01 RX ADMIN — ALBUTEROL SULFATE 2 PUFF: 90 AEROSOL, METERED RESPIRATORY (INHALATION) at 06:31

## 2021-01-01 RX ADMIN — SUCRALFATE 1 G: 1 TABLET ORAL at 21:06

## 2021-01-01 RX ADMIN — METOPROLOL TARTRATE 25 MG: 25 TABLET, FILM COATED ORAL at 01:57

## 2021-01-01 RX ADMIN — FENTANYL CITRATE 50 MCG: 50 INJECTION INTRAMUSCULAR; INTRAVENOUS at 21:03

## 2021-01-01 RX ADMIN — LISINOPRIL 40 MG: 40 TABLET ORAL at 08:06

## 2021-01-01 RX ADMIN — NYSTATIN 4 APPLICATION: 100000 POWDER TOPICAL at 08:58

## 2021-01-01 RX ADMIN — IPRATROPIUM BROMIDE 2 PUFF: 17 AEROSOL, METERED RESPIRATORY (INHALATION) at 07:45

## 2021-01-01 RX ADMIN — PROPOFOL 15 MCG/KG/MIN: 10 INJECTION, EMULSION INTRAVENOUS at 23:58

## 2021-01-01 RX ADMIN — DULOXETINE HYDROCHLORIDE 60 MG: 60 CAPSULE, DELAYED RELEASE ORAL at 08:26

## 2021-01-01 RX ADMIN — HYDROMORPHONE HYDROCHLORIDE 1 MG: 1 INJECTION, SOLUTION INTRAMUSCULAR; INTRAVENOUS; SUBCUTANEOUS at 04:57

## 2021-01-01 RX ADMIN — NYSTATIN 1 APPLICATION: 100000 POWDER TOPICAL at 08:28

## 2021-01-01 RX ADMIN — IPRATROPIUM BROMIDE 2 PUFF: 17 AEROSOL, METERED RESPIRATORY (INHALATION) at 20:12

## 2021-01-01 RX ADMIN — BUDESONIDE AND FORMOTEROL FUMARATE DIHYDRATE 2 PUFF: 160; 4.5 AEROSOL RESPIRATORY (INHALATION) at 22:25

## 2021-01-01 RX ADMIN — PRAVASTATIN SODIUM 20 MG: 20 TABLET ORAL at 21:34

## 2021-01-01 RX ADMIN — NYSTATIN: 100000 POWDER TOPICAL at 22:45

## 2021-01-01 RX ADMIN — OXYCODONE HYDROCHLORIDE AND ACETAMINOPHEN 1000 MG: 500 TABLET ORAL at 09:39

## 2021-01-01 RX ADMIN — HYDROCODONE BITARTRATE AND ACETAMINOPHEN 1 TABLET: 10; 325 TABLET ORAL at 22:34

## 2021-01-01 RX ADMIN — FENTANYL CITRATE: 0.05 INJECTION, SOLUTION INTRAMUSCULAR; INTRAVENOUS at 23:06

## 2021-01-01 RX ADMIN — MIDAZOLAM HYDROCHLORIDE 8 MG/HR: 5 INJECTION, SOLUTION INTRAMUSCULAR; INTRAVENOUS at 23:17

## 2021-01-01 RX ADMIN — SODIUM CHLORIDE, PRESERVATIVE FREE 10 ML: 5 INJECTION INTRAVENOUS at 08:16

## 2021-01-01 RX ADMIN — DEXAMETHASONE SODIUM PHOSPHATE 6 MG: 4 INJECTION, SOLUTION INTRAMUSCULAR; INTRAVENOUS at 20:30

## 2021-01-01 RX ADMIN — DEXAMETHASONE 6 MG: 2 TABLET ORAL at 11:57

## 2021-01-01 RX ADMIN — SODIUM CHLORIDE, PRESERVATIVE FREE 10 ML: 5 INJECTION INTRAVENOUS at 20:01

## 2021-01-01 RX ADMIN — ALBUTEROL SULFATE 2 PUFF: 90 AEROSOL, METERED RESPIRATORY (INHALATION) at 13:30

## 2021-01-01 RX ADMIN — FENTANYL CITRATE: 0.05 INJECTION, SOLUTION INTRAMUSCULAR; INTRAVENOUS at 03:18

## 2021-01-01 RX ADMIN — LATANOPROST 1 DROP: 50 SOLUTION OPHTHALMIC at 21:00

## 2021-01-01 RX ADMIN — IPRATROPIUM BROMIDE 2 PUFF: 17 AEROSOL, METERED RESPIRATORY (INHALATION) at 15:31

## 2021-01-01 RX ADMIN — PROPOFOL 10 MCG/KG/MIN: 10 INJECTION, EMULSION INTRAVENOUS at 15:50

## 2021-01-01 RX ADMIN — PANTOPRAZOLE SODIUM 40 MG: 40 INJECTION, POWDER, FOR SOLUTION INTRAVENOUS at 05:00

## 2021-01-01 RX ADMIN — Medication 1000 UNITS: at 11:58

## 2021-01-01 RX ADMIN — ALBUTEROL SULFATE 2 PUFF: 90 AEROSOL, METERED RESPIRATORY (INHALATION) at 07:30

## 2021-01-01 RX ADMIN — ISOSORBIDE MONONITRATE 15 MG: 30 TABLET, EXTENDED RELEASE ORAL at 08:11

## 2021-01-01 RX ADMIN — FENTANYL CITRATE 25 MCG: 50 INJECTION INTRAMUSCULAR; INTRAVENOUS at 15:59

## 2021-01-01 RX ADMIN — IPRATROPIUM BROMIDE 2 PUFF: 17 AEROSOL, METERED RESPIRATORY (INHALATION) at 11:55

## 2021-01-01 RX ADMIN — SODIUM CHLORIDE, PRESERVATIVE FREE 10 ML: 5 INJECTION INTRAVENOUS at 09:57

## 2021-01-01 RX ADMIN — DEXAMETHASONE 6 MG: 4 TABLET ORAL at 21:17

## 2021-01-01 RX ADMIN — IPRATROPIUM BROMIDE 2 PUFF: 17 AEROSOL, METERED RESPIRATORY (INHALATION) at 20:47

## 2021-01-01 RX ADMIN — SUCRALFATE 1 G: 1 TABLET ORAL at 20:08

## 2021-01-01 RX ADMIN — BUDESONIDE AND FORMOTEROL FUMARATE DIHYDRATE 2 PUFF: 160; 4.5 AEROSOL RESPIRATORY (INHALATION) at 07:56

## 2021-01-01 RX ADMIN — PROPOFOL 5 MCG/KG/MIN: 10 INJECTION, EMULSION INTRAVENOUS at 14:55

## 2021-01-01 RX ADMIN — ZINC SULFATE 220 MG (50 MG) CAPSULE 220 MG: CAPSULE at 09:29

## 2021-01-01 RX ADMIN — IPRATROPIUM BROMIDE 2 PUFF: 17 AEROSOL, METERED RESPIRATORY (INHALATION) at 15:55

## 2021-01-01 RX ADMIN — LORAZEPAM 1 MG: 2 INJECTION INTRAMUSCULAR; INTRAVENOUS at 02:28

## 2021-01-01 RX ADMIN — SUCRALFATE 1 G: 1 TABLET ORAL at 08:26

## 2021-01-01 RX ADMIN — BUDESONIDE AND FORMOTEROL FUMARATE DIHYDRATE 2 PUFF: 160; 4.5 AEROSOL RESPIRATORY (INHALATION) at 08:10

## 2021-01-01 RX ADMIN — IPRATROPIUM BROMIDE 2 PUFF: 17 AEROSOL, METERED RESPIRATORY (INHALATION) at 07:57

## 2021-01-01 RX ADMIN — TRAZODONE HYDROCHLORIDE 100 MG: 100 TABLET ORAL at 20:58

## 2021-01-01 RX ADMIN — DILTIAZEM HYDROCHLORIDE 300 MG: 180 CAPSULE, COATED, EXTENDED RELEASE ORAL at 09:04

## 2021-01-01 RX ADMIN — IPRATROPIUM BROMIDE 2 PUFF: 17 AEROSOL, METERED RESPIRATORY (INHALATION) at 17:02

## 2021-01-01 RX ADMIN — NYSTATIN: 100000 POWDER TOPICAL at 22:16

## 2021-01-01 RX ADMIN — IPRATROPIUM BROMIDE 2 PUFF: 17 AEROSOL, METERED RESPIRATORY (INHALATION) at 16:54

## 2021-01-01 RX ADMIN — DEXAMETHASONE 6 MG: 4 TABLET ORAL at 20:08

## 2021-01-01 RX ADMIN — ALBUTEROL SULFATE 2 PUFF: 90 AEROSOL, METERED RESPIRATORY (INHALATION) at 10:50

## 2021-01-01 RX ADMIN — PRAVASTATIN SODIUM 20 MG: 20 TABLET ORAL at 20:07

## 2021-01-01 RX ADMIN — DEXAMETHASONE SODIUM PHOSPHATE 6 MG: 4 INJECTION, SOLUTION INTRAMUSCULAR; INTRAVENOUS at 21:43

## 2021-01-01 RX ADMIN — PANTOPRAZOLE SODIUM 40 MG: 40 TABLET, DELAYED RELEASE ORAL at 08:07

## 2021-01-01 RX ADMIN — PRAMIPEXOLE DIHYDROCHLORIDE 0.25 MG: 0.12 TABLET ORAL at 20:06

## 2021-01-01 RX ADMIN — NYSTATIN: 100000 POWDER TOPICAL at 20:29

## 2021-01-01 RX ADMIN — SUCRALFATE 1 G: 1 TABLET ORAL at 21:28

## 2021-01-01 RX ADMIN — PRAMIPEXOLE DIHYDROCHLORIDE 0.25 MG: 0.12 TABLET ORAL at 20:54

## 2021-01-01 RX ADMIN — ALBUTEROL SULFATE 2 PUFF: 90 AEROSOL, METERED RESPIRATORY (INHALATION) at 15:54

## 2021-01-01 RX ADMIN — FENTANYL CITRATE 50 MCG: 50 INJECTION INTRAMUSCULAR; INTRAVENOUS at 22:03

## 2021-01-01 RX ADMIN — LORAZEPAM 1 MG: 2 INJECTION INTRAMUSCULAR; INTRAVENOUS at 14:05

## 2021-01-01 RX ADMIN — METOPROLOL TARTRATE 25 MG: 25 TABLET, FILM COATED ORAL at 09:13

## 2021-01-01 RX ADMIN — BUDESONIDE AND FORMOTEROL FUMARATE DIHYDRATE 2 PUFF: 160; 4.5 AEROSOL RESPIRATORY (INHALATION) at 08:28

## 2021-01-01 RX ADMIN — ALBUTEROL SULFATE 2 PUFF: 90 AEROSOL, METERED RESPIRATORY (INHALATION) at 07:21

## 2021-01-01 RX ADMIN — ATROPINE SULFATE 2 DROP: 10 SOLUTION OPHTHALMIC at 04:59

## 2021-01-01 RX ADMIN — DEXAMETHASONE 6 MG: 4 TABLET ORAL at 09:39

## 2021-01-01 RX ADMIN — ZINC SULFATE 220 MG (50 MG) CAPSULE 220 MG: CAPSULE at 09:02

## 2021-01-01 RX ADMIN — BARICITINIB 4 MG: 2 TABLET, FILM COATED ORAL at 09:04

## 2021-01-01 RX ADMIN — PERFLUTREN 1.5 ML: 6.52 INJECTION, SUSPENSION INTRAVENOUS at 11:58

## 2021-01-01 RX ADMIN — FENTANYL CITRATE 50 MCG: 50 INJECTION INTRAMUSCULAR; INTRAVENOUS at 14:05

## 2021-01-01 RX ADMIN — Medication 0.3 MCG/KG/MIN: at 15:34

## 2021-01-01 RX ADMIN — DEXAMETHASONE 6 MG: 4 TABLET ORAL at 20:53

## 2021-01-01 RX ADMIN — Medication 1 APPLICATION: at 20:29

## 2021-01-01 RX ADMIN — NYSTATIN: 100000 POWDER TOPICAL at 20:20

## 2021-01-01 RX ADMIN — MIDAZOLAM HYDROCHLORIDE 8 MG/HR: 5 INJECTION, SOLUTION INTRAMUSCULAR; INTRAVENOUS at 06:19

## 2021-01-01 RX ADMIN — LATANOPROST 1 DROP: 50 SOLUTION OPHTHALMIC at 20:38

## 2021-01-01 RX ADMIN — LORAZEPAM 1 MG: 2 INJECTION INTRAMUSCULAR; INTRAVENOUS at 10:14

## 2021-01-01 RX ADMIN — NYSTATIN: 100000 POWDER TOPICAL at 08:07

## 2021-01-01 RX ADMIN — BUMETANIDE 0.5 MG: 0.25 INJECTION INTRAMUSCULAR; INTRAVENOUS at 08:01

## 2021-01-01 RX ADMIN — PROPOFOL 25 MCG/KG/MIN: 10 INJECTION, EMULSION INTRAVENOUS at 17:11

## 2021-01-01 RX ADMIN — METOPROLOL TARTRATE 25 MG: 25 TABLET, FILM COATED ORAL at 11:00

## 2021-01-01 RX ADMIN — POTASSIUM CHLORIDE 10 MEQ: 750 CAPSULE, EXTENDED RELEASE ORAL at 11:58

## 2021-01-01 RX ADMIN — ENOXAPARIN SODIUM 40 MG: 40 INJECTION SUBCUTANEOUS at 13:41

## 2021-01-01 RX ADMIN — NYSTATIN: 100000 POWDER TOPICAL at 09:59

## 2021-01-01 RX ADMIN — PANTOPRAZOLE SODIUM 40 MG: 40 INJECTION, POWDER, FOR SOLUTION INTRAVENOUS at 06:10

## 2021-01-01 RX ADMIN — SUCRALFATE 1 G: 1 TABLET ORAL at 01:56

## 2021-01-01 RX ADMIN — ALBUTEROL SULFATE 2 PUFF: 90 AEROSOL, METERED RESPIRATORY (INHALATION) at 20:10

## 2021-01-01 RX ADMIN — LATANOPROST 1 DROP: 50 SOLUTION OPHTHALMIC at 21:45

## 2021-01-01 RX ADMIN — PANTOPRAZOLE SODIUM 40 MG: 40 INJECTION, POWDER, FOR SOLUTION INTRAVENOUS at 05:50

## 2021-01-01 RX ADMIN — GABAPENTIN 200 MG: 100 CAPSULE ORAL at 22:16

## 2021-01-01 RX ADMIN — IPRATROPIUM BROMIDE 2 PUFF: 17 AEROSOL, METERED RESPIRATORY (INHALATION) at 21:54

## 2021-01-01 RX ADMIN — METOPROLOL TARTRATE 25 MG: 25 TABLET, FILM COATED ORAL at 11:57

## 2021-01-01 RX ADMIN — METOPROLOL TARTRATE 25 MG: 25 TABLET, FILM COATED ORAL at 09:04

## 2021-01-01 RX ADMIN — GABAPENTIN 200 MG: 100 CAPSULE ORAL at 21:17

## 2021-01-01 RX ADMIN — HALOPERIDOL LACTATE 2 MG: 5 INJECTION, SOLUTION INTRAMUSCULAR at 02:29

## 2021-01-01 RX ADMIN — SODIUM CHLORIDE, PRESERVATIVE FREE 10 ML: 5 INJECTION INTRAVENOUS at 09:36

## 2021-01-01 RX ADMIN — METOPROLOL TARTRATE 25 MG: 25 TABLET, FILM COATED ORAL at 08:02

## 2021-01-01 RX ADMIN — LATANOPROST 1 DROP: 50 SOLUTION OPHTHALMIC at 22:43

## 2021-01-01 RX ADMIN — ENOXAPARIN SODIUM 40 MG: 40 INJECTION SUBCUTANEOUS at 13:00

## 2021-01-01 RX ADMIN — PROPOFOL 5 MCG/KG/MIN: 10 INJECTION, EMULSION INTRAVENOUS at 14:52

## 2021-01-01 RX ADMIN — SUCRALFATE 1 G: 1 TABLET ORAL at 08:50

## 2021-01-01 RX ADMIN — ENOXAPARIN SODIUM 40 MG: 40 INJECTION SUBCUTANEOUS at 00:58

## 2021-01-01 RX ADMIN — NYSTATIN: 100000 POWDER TOPICAL at 09:39

## 2021-01-01 RX ADMIN — OXYCODONE HYDROCHLORIDE AND ACETAMINOPHEN 1000 MG: 500 TABLET ORAL at 09:29

## 2021-01-01 RX ADMIN — ALBUTEROL SULFATE 2 PUFF: 90 AEROSOL, METERED RESPIRATORY (INHALATION) at 10:35

## 2021-01-01 RX ADMIN — ASPIRIN 81 MG 81 MG: 81 TABLET ORAL at 09:58

## 2021-01-01 RX ADMIN — IPRATROPIUM BROMIDE 2 PUFF: 17 AEROSOL, METERED RESPIRATORY (INHALATION) at 08:27

## 2021-01-01 RX ADMIN — HYDROMORPHONE HYDROCHLORIDE 1 MG: 1 INJECTION, SOLUTION INTRAMUSCULAR; INTRAVENOUS; SUBCUTANEOUS at 08:17

## 2021-01-01 RX ADMIN — LORAZEPAM 1 MG: 2 INJECTION INTRAMUSCULAR; INTRAVENOUS at 09:37

## 2021-01-01 RX ADMIN — OXYCODONE HYDROCHLORIDE AND ACETAMINOPHEN 1000 MG: 500 TABLET ORAL at 08:50

## 2021-01-01 RX ADMIN — DULOXETINE HYDROCHLORIDE 60 MG: 60 CAPSULE, DELAYED RELEASE ORAL at 09:38

## 2021-01-01 RX ADMIN — Medication 1000 UNITS: at 09:56

## 2021-01-01 RX ADMIN — PRAVASTATIN SODIUM 20 MG: 20 TABLET ORAL at 20:01

## 2021-01-01 RX ADMIN — ENOXAPARIN SODIUM 40 MG: 40 INJECTION SUBCUTANEOUS at 12:14

## 2021-01-01 RX ADMIN — POTASSIUM CHLORIDE 10 MEQ: 750 CAPSULE, EXTENDED RELEASE ORAL at 08:06

## 2021-01-01 RX ADMIN — ALBUTEROL SULFATE 2 PUFF: 90 AEROSOL, METERED RESPIRATORY (INHALATION) at 20:12

## 2021-01-01 RX ADMIN — BUDESONIDE AND FORMOTEROL FUMARATE DIHYDRATE 2 PUFF: 160; 4.5 AEROSOL RESPIRATORY (INHALATION) at 07:38

## 2021-01-01 RX ADMIN — METOPROLOL TARTRATE 25 MG: 25 TABLET, FILM COATED ORAL at 20:31

## 2021-01-01 RX ADMIN — DULOXETINE HYDROCHLORIDE 60 MG: 60 CAPSULE, DELAYED RELEASE ORAL at 11:00

## 2021-01-01 RX ADMIN — Medication 1 APPLICATION: at 08:27

## 2021-01-01 RX ADMIN — PROPOFOL 35 MCG/KG/MIN: 10 INJECTION, EMULSION INTRAVENOUS at 01:30

## 2021-01-01 RX ADMIN — PRAMIPEXOLE DIHYDROCHLORIDE 0.25 MG: 0.12 TABLET ORAL at 21:20

## 2021-01-01 RX ADMIN — ALBUTEROL SULFATE 2 PUFF: 90 AEROSOL, METERED RESPIRATORY (INHALATION) at 20:01

## 2021-01-01 RX ADMIN — GABAPENTIN 200 MG: 100 CAPSULE ORAL at 01:56

## 2021-01-01 RX ADMIN — LISINOPRIL 40 MG: 40 TABLET ORAL at 09:55

## 2021-01-01 RX ADMIN — ALBUTEROL SULFATE 2 PUFF: 90 AEROSOL, METERED RESPIRATORY (INHALATION) at 22:28

## 2021-01-01 RX ADMIN — SODIUM CHLORIDE, PRESERVATIVE FREE 10 ML: 5 INJECTION INTRAVENOUS at 08:18

## 2021-01-01 RX ADMIN — OXYCODONE HYDROCHLORIDE AND ACETAMINOPHEN 1000 MG: 500 TABLET ORAL at 11:00

## 2021-01-01 RX ADMIN — GABAPENTIN 200 MG: 100 CAPSULE ORAL at 20:09

## 2021-01-01 RX ADMIN — MIDAZOLAM HYDROCHLORIDE 6 MG/HR: 5 INJECTION, SOLUTION INTRAMUSCULAR; INTRAVENOUS at 10:51

## 2021-01-01 RX ADMIN — ALBUTEROL SULFATE 2 PUFF: 90 AEROSOL, METERED RESPIRATORY (INHALATION) at 10:42

## 2021-01-01 RX ADMIN — LATANOPROST 1 DROP: 50 SOLUTION OPHTHALMIC at 21:11

## 2021-01-01 RX ADMIN — PROPOFOL 10 MCG/KG/MIN: 10 INJECTION, EMULSION INTRAVENOUS at 06:11

## 2021-01-01 RX ADMIN — ENOXAPARIN SODIUM 40 MG: 40 INJECTION SUBCUTANEOUS at 00:55

## 2021-01-01 RX ADMIN — MIDAZOLAM HYDROCHLORIDE 3 MG/HR: 5 INJECTION, SOLUTION INTRAMUSCULAR; INTRAVENOUS at 15:52

## 2021-01-01 RX ADMIN — BUMETANIDE 0.5 MG: 0.25 INJECTION INTRAMUSCULAR; INTRAVENOUS at 08:19

## 2021-01-01 RX ADMIN — LISINOPRIL 40 MG: 40 TABLET ORAL at 09:29

## 2021-01-01 RX ADMIN — Medication 1 APPLICATION: at 22:16

## 2021-01-01 RX ADMIN — SODIUM CHLORIDE 75 ML/HR: 900 INJECTION, SOLUTION INTRAVENOUS at 09:06

## 2021-01-01 RX ADMIN — IPRATROPIUM BROMIDE 2 PUFF: 17 AEROSOL, METERED RESPIRATORY (INHALATION) at 16:02

## 2021-01-01 RX ADMIN — PANTOPRAZOLE SODIUM 40 MG: 40 INJECTION, POWDER, FOR SOLUTION INTRAVENOUS at 05:12

## 2021-01-01 RX ADMIN — IPRATROPIUM BROMIDE 2 PUFF: 17 AEROSOL, METERED RESPIRATORY (INHALATION) at 06:52

## 2021-01-01 RX ADMIN — ENOXAPARIN SODIUM 40 MG: 40 INJECTION SUBCUTANEOUS at 01:50

## 2021-01-01 RX ADMIN — FENTANYL CITRATE 50 MCG: 50 INJECTION INTRAMUSCULAR; INTRAVENOUS at 10:15

## 2021-01-01 RX ADMIN — DILTIAZEM HYDROCHLORIDE 300 MG: 180 CAPSULE, COATED, EXTENDED RELEASE ORAL at 09:02

## 2021-01-01 RX ADMIN — ALBUTEROL SULFATE 2 PUFF: 90 AEROSOL, METERED RESPIRATORY (INHALATION) at 22:40

## 2021-01-01 RX ADMIN — ALBUTEROL SULFATE 2 PUFF: 90 AEROSOL, METERED RESPIRATORY (INHALATION) at 21:33

## 2021-01-01 RX ADMIN — ALBUTEROL SULFATE 2 PUFF: 90 AEROSOL, METERED RESPIRATORY (INHALATION) at 11:51

## 2021-01-01 RX ADMIN — ALBUTEROL SULFATE 2 PUFF: 90 AEROSOL, METERED RESPIRATORY (INHALATION) at 16:17

## 2021-01-01 RX ADMIN — METOPROLOL TARTRATE 25 MG: 25 TABLET, FILM COATED ORAL at 08:06

## 2021-01-01 RX ADMIN — HYDROXYZINE HYDROCHLORIDE 25 MG: 25 TABLET, FILM COATED ORAL at 20:36

## 2021-01-01 RX ADMIN — BUDESONIDE AND FORMOTEROL FUMARATE DIHYDRATE 2 PUFF: 160; 4.5 AEROSOL RESPIRATORY (INHALATION) at 08:40

## 2021-01-01 RX ADMIN — IPRATROPIUM BROMIDE 2 PUFF: 17 AEROSOL, METERED RESPIRATORY (INHALATION) at 20:30

## 2021-01-01 RX ADMIN — GABAPENTIN 200 MG: 100 CAPSULE ORAL at 21:06

## 2021-01-01 RX ADMIN — DEXAMETHASONE 6 MG: 2 TABLET ORAL at 08:06

## 2021-01-01 RX ADMIN — IPRATROPIUM BROMIDE 2 PUFF: 17 AEROSOL, METERED RESPIRATORY (INHALATION) at 07:33

## 2021-01-01 RX ADMIN — POTASSIUM CHLORIDE 10 MEQ: 750 CAPSULE, EXTENDED RELEASE ORAL at 08:50

## 2021-01-01 RX ADMIN — PRAMIPEXOLE DIHYDROCHLORIDE 0.25 MG: 0.12 TABLET ORAL at 20:01

## 2021-01-01 RX ADMIN — ALBUTEROL SULFATE 2 PUFF: 90 AEROSOL, METERED RESPIRATORY (INHALATION) at 06:52

## 2021-01-01 RX ADMIN — DEXAMETHASONE 6 MG: 4 TABLET ORAL at 20:09

## 2021-01-01 RX ADMIN — Medication 1000 UNITS: at 08:06

## 2021-01-01 RX ADMIN — NYSTATIN: 100000 POWDER TOPICAL at 09:41

## 2021-01-01 RX ADMIN — ALBUTEROL SULFATE 2 PUFF: 90 AEROSOL, METERED RESPIRATORY (INHALATION) at 10:52

## 2021-01-01 RX ADMIN — METOPROLOL TARTRATE 25 MG: 25 TABLET, FILM COATED ORAL at 09:57

## 2021-01-01 RX ADMIN — OXYCODONE HYDROCHLORIDE AND ACETAMINOPHEN 1000 MG: 500 TABLET ORAL at 11:58

## 2021-01-01 RX ADMIN — PRAVASTATIN SODIUM 20 MG: 20 TABLET ORAL at 01:57

## 2021-01-01 RX ADMIN — BARICITINIB 2 MG: 2 TABLET, FILM COATED ORAL at 08:37

## 2021-01-01 RX ADMIN — PROPOFOL 35 MCG/KG/MIN: 10 INJECTION, EMULSION INTRAVENOUS at 06:45

## 2021-01-01 RX ADMIN — BUMETANIDE 0.5 MG: 0.25 INJECTION INTRAMUSCULAR; INTRAVENOUS at 17:03

## 2021-01-01 RX ADMIN — LORAZEPAM 1 MG: 2 INJECTION INTRAMUSCULAR; INTRAVENOUS at 00:29

## 2021-01-01 RX ADMIN — PRAVASTATIN SODIUM 20 MG: 20 TABLET ORAL at 20:58

## 2021-01-01 RX ADMIN — NYSTATIN: 100000 POWDER TOPICAL at 20:36

## 2021-01-01 RX ADMIN — ENOXAPARIN SODIUM 40 MG: 40 INJECTION SUBCUTANEOUS at 00:25

## 2021-01-01 RX ADMIN — LORAZEPAM 1 MG: 2 INJECTION INTRAMUSCULAR; INTRAVENOUS at 22:03

## 2021-01-01 RX ADMIN — DEXAMETHASONE 6 MG: 4 TABLET ORAL at 09:58

## 2021-01-01 RX ADMIN — IPRATROPIUM BROMIDE 2 PUFF: 17 AEROSOL, METERED RESPIRATORY (INHALATION) at 16:36

## 2021-01-01 RX ADMIN — POTASSIUM CHLORIDE 10 MEQ: 750 CAPSULE, EXTENDED RELEASE ORAL at 11:00

## 2021-01-01 RX ADMIN — PANTOPRAZOLE SODIUM 40 MG: 40 INJECTION, POWDER, FOR SOLUTION INTRAVENOUS at 05:36

## 2021-01-01 RX ADMIN — ISOSORBIDE MONONITRATE 30 MG: 30 TABLET, EXTENDED RELEASE ORAL at 09:21

## 2021-01-01 RX ADMIN — ASPIRIN 81 MG 81 MG: 81 TABLET ORAL at 09:56

## 2021-01-01 RX ADMIN — LORAZEPAM 1 MG: 2 INJECTION INTRAMUSCULAR; INTRAVENOUS at 17:39

## 2021-01-01 RX ADMIN — LATANOPROST 1 DROP: 50 SOLUTION OPHTHALMIC at 01:56

## 2021-01-01 RX ADMIN — BUDESONIDE AND FORMOTEROL FUMARATE DIHYDRATE 2 PUFF: 160; 4.5 AEROSOL RESPIRATORY (INHALATION) at 20:02

## 2021-01-01 RX ADMIN — SUCRALFATE 1 G: 1 TABLET ORAL at 11:00

## 2021-01-01 RX ADMIN — PRAMIPEXOLE DIHYDROCHLORIDE 0.25 MG: 0.12 TABLET ORAL at 22:42

## 2021-01-01 RX ADMIN — PRAVASTATIN SODIUM 20 MG: 20 TABLET ORAL at 20:54

## 2021-01-01 RX ADMIN — DULOXETINE HYDROCHLORIDE 60 MG: 60 CAPSULE, DELAYED RELEASE ORAL at 08:14

## 2021-01-01 RX ADMIN — PROPOFOL 30 MCG/KG/MIN: 10 INJECTION, EMULSION INTRAVENOUS at 16:38

## 2021-01-01 RX ADMIN — ASPIRIN 324 MG: 81 TABLET, CHEWABLE ORAL at 20:20

## 2021-01-01 RX ADMIN — METOPROLOL TARTRATE 25 MG: 25 TABLET, FILM COATED ORAL at 20:08

## 2021-01-01 RX ADMIN — ALBUTEROL SULFATE 2 PUFF: 90 AEROSOL, METERED RESPIRATORY (INHALATION) at 07:38

## 2021-01-01 RX ADMIN — METOPROLOL TARTRATE 25 MG: 25 TABLET, FILM COATED ORAL at 22:17

## 2021-01-01 RX ADMIN — SUCCINYLCHOLINE CHLORIDE 100 MG: 20 INJECTION, SOLUTION INTRAMUSCULAR; INTRAVENOUS at 14:36

## 2021-01-01 RX ADMIN — LISINOPRIL 20 MG: 40 TABLET ORAL at 08:12

## 2021-01-01 RX ADMIN — IPRATROPIUM BROMIDE 2 PUFF: 17 AEROSOL, METERED RESPIRATORY (INHALATION) at 10:35

## 2021-01-01 RX ADMIN — LORAZEPAM 1 MG: 2 INJECTION INTRAMUSCULAR; INTRAVENOUS at 23:16

## 2021-01-01 RX ADMIN — ALBUTEROL SULFATE 2 PUFF: 90 AEROSOL, METERED RESPIRATORY (INHALATION) at 19:38

## 2021-01-01 RX ADMIN — PANTOPRAZOLE SODIUM 40 MG: 40 INJECTION, POWDER, FOR SOLUTION INTRAVENOUS at 05:29

## 2021-01-01 RX ADMIN — BUMETANIDE 0.5 MG: 0.25 INJECTION INTRAMUSCULAR; INTRAVENOUS at 09:58

## 2021-01-01 RX ADMIN — IPRATROPIUM BROMIDE 2 PUFF: 17 AEROSOL, METERED RESPIRATORY (INHALATION) at 08:31

## 2021-01-01 RX ADMIN — DULOXETINE HYDROCHLORIDE 60 MG: 60 CAPSULE, DELAYED RELEASE ORAL at 09:15

## 2021-01-01 RX ADMIN — DEXAMETHASONE SODIUM PHOSPHATE 6 MG: 4 INJECTION, SOLUTION INTRAMUSCULAR; INTRAVENOUS at 22:17

## 2021-01-01 RX ADMIN — BUDESONIDE AND FORMOTEROL FUMARATE DIHYDRATE 2 PUFF: 160; 4.5 AEROSOL RESPIRATORY (INHALATION) at 06:31

## 2021-01-01 RX ADMIN — SODIUM CHLORIDE, PRESERVATIVE FREE 10 ML: 5 INJECTION INTRAVENOUS at 08:49

## 2021-01-01 RX ADMIN — SUCRALFATE 1 G: 1 TABLET ORAL at 09:02

## 2021-01-01 RX ADMIN — PROPOFOL 15 MCG/KG/MIN: 10 INJECTION, EMULSION INTRAVENOUS at 22:16

## 2021-01-01 RX ADMIN — HYDROCODONE BITARTRATE AND ACETAMINOPHEN 1 TABLET: 10; 325 TABLET ORAL at 21:28

## 2021-01-01 RX ADMIN — ASPIRIN 81 MG 81 MG: 81 TABLET ORAL at 09:14

## 2021-01-01 RX ADMIN — METOPROLOL TARTRATE 25 MG: 25 TABLET, FILM COATED ORAL at 21:17

## 2021-01-01 RX ADMIN — DEXAMETHASONE 6 MG: 4 TABLET ORAL at 09:14

## 2021-01-01 RX ADMIN — IPRATROPIUM BROMIDE 2 PUFF: 17 AEROSOL, METERED RESPIRATORY (INHALATION) at 10:42

## 2021-01-01 RX ADMIN — DILTIAZEM HYDROCHLORIDE 300 MG: 180 CAPSULE, COATED, EXTENDED RELEASE ORAL at 11:00

## 2021-01-01 RX ADMIN — TRAZODONE HYDROCHLORIDE 100 MG: 100 TABLET ORAL at 21:28

## 2021-01-01 RX ADMIN — ISOSORBIDE MONONITRATE 30 MG: 30 TABLET, EXTENDED RELEASE ORAL at 11:58

## 2021-01-01 RX ADMIN — LORAZEPAM 1 MG: 2 INJECTION INTRAMUSCULAR; INTRAVENOUS at 11:33

## 2021-01-01 RX ADMIN — NYSTATIN: 100000 POWDER TOPICAL at 20:07

## 2021-01-01 RX ADMIN — PRAMIPEXOLE DIHYDROCHLORIDE 0.25 MG: 0.12 TABLET ORAL at 22:17

## 2021-01-01 RX ADMIN — BUMETANIDE 1 MG: 1 TABLET ORAL at 11:00

## 2021-01-01 RX ADMIN — ALBUTEROL SULFATE 2 PUFF: 90 AEROSOL, METERED RESPIRATORY (INHALATION) at 21:54

## 2021-01-01 RX ADMIN — LATANOPROST 1 DROP: 50 SOLUTION OPHTHALMIC at 22:16

## 2021-01-01 RX ADMIN — BUDESONIDE AND FORMOTEROL FUMARATE DIHYDRATE 2 PUFF: 160; 4.5 AEROSOL RESPIRATORY (INHALATION) at 21:54

## 2021-01-01 RX ADMIN — GABAPENTIN 200 MG: 100 CAPSULE ORAL at 21:34

## 2021-01-01 RX ADMIN — METOPROLOL TARTRATE 25 MG: 25 TABLET, FILM COATED ORAL at 08:26

## 2021-01-01 RX ADMIN — NYSTATIN: 100000 POWDER TOPICAL at 21:36

## 2021-01-01 RX ADMIN — ALBUTEROL SULFATE 2 PUFF: 90 AEROSOL, METERED RESPIRATORY (INHALATION) at 15:48

## 2021-01-01 RX ADMIN — DEXAMETHASONE SODIUM PHOSPHATE 6 MG: 4 INJECTION, SOLUTION INTRAMUSCULAR; INTRAVENOUS at 08:01

## 2021-01-01 RX ADMIN — CEFTRIAXONE SODIUM 1 G: 1 INJECTION, POWDER, FOR SOLUTION INTRAMUSCULAR; INTRAVENOUS at 20:09

## 2021-01-01 RX ADMIN — SODIUM CHLORIDE, PRESERVATIVE FREE 10 ML: 5 INJECTION INTRAVENOUS at 08:48

## 2021-01-01 RX ADMIN — PROPOFOL 25 MCG/KG/MIN: 10 INJECTION, EMULSION INTRAVENOUS at 17:03

## 2021-01-01 RX ADMIN — DEXAMETHASONE 6 MG: 2 TABLET ORAL at 09:29

## 2021-01-01 RX ADMIN — BUMETANIDE 0.5 MG: 0.25 INJECTION INTRAMUSCULAR; INTRAVENOUS at 17:41

## 2021-01-01 RX ADMIN — BUMETANIDE 0.5 MG: 0.25 INJECTION INTRAMUSCULAR; INTRAVENOUS at 09:33

## 2021-01-01 RX ADMIN — HYDROMORPHONE HYDROCHLORIDE 1 MG: 1 INJECTION, SOLUTION INTRAMUSCULAR; INTRAVENOUS; SUBCUTANEOUS at 03:43

## 2021-01-01 RX ADMIN — ALBUTEROL SULFATE 2 PUFF: 90 AEROSOL, METERED RESPIRATORY (INHALATION) at 08:31

## 2021-01-01 RX ADMIN — IPRATROPIUM BROMIDE 2 PUFF: 17 AEROSOL, METERED RESPIRATORY (INHALATION) at 06:31

## 2021-01-01 RX ADMIN — FENTANYL CITRATE: 0.05 INJECTION, SOLUTION INTRAMUSCULAR; INTRAVENOUS at 09:11

## 2021-01-01 RX ADMIN — PRAMIPEXOLE DIHYDROCHLORIDE 0.25 MG: 0.12 TABLET ORAL at 20:30

## 2021-01-01 RX ADMIN — PROPOFOL 10 MCG/KG/MIN: 10 INJECTION, EMULSION INTRAVENOUS at 00:05

## 2021-01-01 RX ADMIN — LISINOPRIL 40 MG: 40 TABLET ORAL at 08:50

## 2021-01-01 RX ADMIN — PRAMIPEXOLE DIHYDROCHLORIDE 0.25 MG: 0.12 TABLET ORAL at 20:36

## 2021-01-01 RX ADMIN — IPRATROPIUM BROMIDE 2 PUFF: 17 AEROSOL, METERED RESPIRATORY (INHALATION) at 12:20

## 2021-01-01 RX ADMIN — HYDROMORPHONE HYDROCHLORIDE 0.5 MG: 1 INJECTION, SOLUTION INTRAMUSCULAR; INTRAVENOUS; SUBCUTANEOUS at 00:29

## 2021-01-01 RX ADMIN — BUMETANIDE 1 MG: 1 TABLET ORAL at 09:29

## 2021-01-01 RX ADMIN — ALBUTEROL SULFATE 2 PUFF: 90 AEROSOL, METERED RESPIRATORY (INHALATION) at 16:36

## 2021-01-01 RX ADMIN — GABAPENTIN 200 MG: 100 CAPSULE ORAL at 21:28

## 2021-01-01 RX ADMIN — IPRATROPIUM BROMIDE 2 PUFF: 17 AEROSOL, METERED RESPIRATORY (INHALATION) at 22:31

## 2021-01-01 RX ADMIN — METOPROLOL TARTRATE 25 MG: 25 TABLET, FILM COATED ORAL at 21:08

## 2021-01-01 RX ADMIN — ENOXAPARIN SODIUM 40 MG: 40 INJECTION SUBCUTANEOUS at 11:07

## 2021-01-01 RX ADMIN — ENOXAPARIN SODIUM 40 MG: 40 INJECTION SUBCUTANEOUS at 12:38

## 2021-01-01 RX ADMIN — IPRATROPIUM BROMIDE 2 PUFF: 17 AEROSOL, METERED RESPIRATORY (INHALATION) at 19:38

## 2021-01-01 RX ADMIN — IPRATROPIUM BROMIDE 2 PUFF: 17 AEROSOL, METERED RESPIRATORY (INHALATION) at 07:21

## 2021-01-01 RX ADMIN — SUCRALFATE 1 G: 1 TABLET ORAL at 21:17

## 2021-01-01 RX ADMIN — ALBUTEROL SULFATE 2 PUFF: 90 AEROSOL, METERED RESPIRATORY (INHALATION) at 07:44

## 2021-01-01 RX ADMIN — LISINOPRIL 40 MG: 40 TABLET ORAL at 09:02

## 2021-01-01 RX ADMIN — PANTOPRAZOLE SODIUM 40 MG: 40 TABLET, DELAYED RELEASE ORAL at 09:29

## 2021-01-01 RX ADMIN — OXYCODONE HYDROCHLORIDE AND ACETAMINOPHEN 1000 MG: 500 TABLET ORAL at 08:07

## 2021-01-01 RX ADMIN — GABAPENTIN 200 MG: 100 CAPSULE ORAL at 20:54

## 2021-01-01 RX ADMIN — ZINC SULFATE 220 MG (50 MG) CAPSULE 220 MG: CAPSULE at 11:00

## 2021-01-01 RX ADMIN — Medication 1 APPLICATION: at 09:40

## 2021-01-01 RX ADMIN — ENOXAPARIN SODIUM 40 MG: 40 INJECTION SUBCUTANEOUS at 23:51

## 2021-01-01 RX ADMIN — SUCRALFATE 1 G: 1 TABLET ORAL at 20:30

## 2021-01-01 RX ADMIN — IPRATROPIUM BROMIDE 2 PUFF: 17 AEROSOL, METERED RESPIRATORY (INHALATION) at 16:22

## 2021-01-01 RX ADMIN — ZINC SULFATE 220 MG (50 MG) CAPSULE 220 MG: CAPSULE at 09:57

## 2021-01-01 RX ADMIN — BUDESONIDE AND FORMOTEROL FUMARATE DIHYDRATE 2 PUFF: 160; 4.5 AEROSOL RESPIRATORY (INHALATION) at 20:04

## 2021-01-01 RX ADMIN — ENOXAPARIN SODIUM 40 MG: 40 INJECTION SUBCUTANEOUS at 00:01

## 2021-01-01 RX ADMIN — Medication 1 APPLICATION: at 08:45

## 2021-01-01 RX ADMIN — BUDESONIDE AND FORMOTEROL FUMARATE DIHYDRATE 2 PUFF: 160; 4.5 AEROSOL RESPIRATORY (INHALATION) at 20:01

## 2021-01-01 RX ADMIN — NYSTATIN: 100000 POWDER TOPICAL at 10:40

## 2021-01-01 RX ADMIN — ENOXAPARIN SODIUM 40 MG: 40 INJECTION SUBCUTANEOUS at 23:07

## 2021-01-01 RX ADMIN — ALBUTEROL SULFATE 2 PUFF: 90 AEROSOL, METERED RESPIRATORY (INHALATION) at 15:31

## 2021-01-01 RX ADMIN — ENOXAPARIN SODIUM 40 MG: 40 INJECTION SUBCUTANEOUS at 00:30

## 2021-01-01 RX ADMIN — CEFTRIAXONE SODIUM 1 G: 1 INJECTION, POWDER, FOR SOLUTION INTRAMUSCULAR; INTRAVENOUS at 21:28

## 2021-01-01 RX ADMIN — LISINOPRIL 40 MG: 40 TABLET ORAL at 08:27

## 2021-01-01 RX ADMIN — MIDAZOLAM HYDROCHLORIDE 10 MG/HR: 5 INJECTION, SOLUTION INTRAMUSCULAR; INTRAVENOUS at 23:35

## 2021-01-01 RX ADMIN — LORAZEPAM 1 MG: 2 INJECTION INTRAMUSCULAR; INTRAVENOUS at 18:35

## 2021-01-01 RX ADMIN — SODIUM CHLORIDE, PRESERVATIVE FREE 10 ML: 5 INJECTION INTRAVENOUS at 21:29

## 2021-01-01 RX ADMIN — PROPOFOL 25 MCG/KG/MIN: 10 INJECTION, EMULSION INTRAVENOUS at 12:13

## 2021-01-01 RX ADMIN — IPRATROPIUM BROMIDE 2 PUFF: 17 AEROSOL, METERED RESPIRATORY (INHALATION) at 13:44

## 2021-01-01 RX ADMIN — DILTIAZEM HYDROCHLORIDE 300 MG: 180 CAPSULE, COATED, EXTENDED RELEASE ORAL at 08:06

## 2021-01-01 RX ADMIN — IPRATROPIUM BROMIDE 2 PUFF: 17 AEROSOL, METERED RESPIRATORY (INHALATION) at 07:30

## 2021-01-01 RX ADMIN — ENOXAPARIN SODIUM 40 MG: 40 INJECTION SUBCUTANEOUS at 11:41

## 2021-01-01 RX ADMIN — ENOXAPARIN SODIUM 40 MG: 40 INJECTION SUBCUTANEOUS at 11:00

## 2021-01-01 RX ADMIN — DEXAMETHASONE SODIUM PHOSPHATE 6 MG: 4 INJECTION, SOLUTION INTRAMUSCULAR; INTRAVENOUS at 08:37

## 2021-01-01 RX ADMIN — DILTIAZEM HYDROCHLORIDE 300 MG: 180 CAPSULE, COATED, EXTENDED RELEASE ORAL at 09:56

## 2021-01-01 RX ADMIN — SUCRALFATE 1 G: 1 TABLET ORAL at 09:29

## 2021-01-01 RX ADMIN — IPRATROPIUM BROMIDE 2 PUFF: 17 AEROSOL, METERED RESPIRATORY (INHALATION) at 07:37

## 2021-01-01 RX ADMIN — GABAPENTIN 200 MG: 100 CAPSULE ORAL at 21:08

## 2021-01-01 RX ADMIN — ACETAMINOPHEN 650 MG: 325 TABLET, FILM COATED ORAL at 09:51

## 2021-01-01 RX ADMIN — TRAZODONE HYDROCHLORIDE 100 MG: 100 TABLET ORAL at 21:43

## 2021-01-01 RX ADMIN — LATANOPROST 1 DROP: 50 SOLUTION OPHTHALMIC at 20:08

## 2021-01-01 RX ADMIN — HYDROMORPHONE HYDROCHLORIDE 1 MG: 1 INJECTION, SOLUTION INTRAMUSCULAR; INTRAVENOUS; SUBCUTANEOUS at 02:29

## 2021-01-01 RX ADMIN — FENTANYL CITRATE 50 MCG: 50 INJECTION INTRAMUSCULAR; INTRAVENOUS at 16:10

## 2021-01-01 RX ADMIN — MIDAZOLAM HYDROCHLORIDE 2 MG/HR: 5 INJECTION, SOLUTION INTRAMUSCULAR; INTRAVENOUS at 15:05

## 2021-01-01 RX ADMIN — ENOXAPARIN SODIUM 40 MG: 40 INJECTION SUBCUTANEOUS at 09:00

## 2021-01-01 RX ADMIN — FENTANYL CITRATE: 0.05 INJECTION, SOLUTION INTRAMUSCULAR; INTRAVENOUS at 19:03

## 2021-01-01 RX ADMIN — DEXAMETHASONE SODIUM PHOSPHATE 6 MG: 4 INJECTION, SOLUTION INTRAMUSCULAR; INTRAVENOUS at 21:35

## 2021-01-01 RX ADMIN — SODIUM CHLORIDE, PRESERVATIVE FREE 10 ML: 5 INJECTION INTRAVENOUS at 08:28

## 2021-01-01 RX ADMIN — NYSTATIN: 100000 POWDER TOPICAL at 08:26

## 2021-01-01 RX ADMIN — ZINC SULFATE 220 MG (50 MG) CAPSULE 220 MG: CAPSULE at 08:50

## 2021-01-01 RX ADMIN — PANTOPRAZOLE SODIUM 40 MG: 40 INJECTION, POWDER, FOR SOLUTION INTRAVENOUS at 05:59

## 2021-01-01 RX ADMIN — ENOXAPARIN SODIUM 40 MG: 40 INJECTION SUBCUTANEOUS at 23:42

## 2021-01-01 RX ADMIN — ALBUTEROL SULFATE 2 PUFF: 90 AEROSOL, METERED RESPIRATORY (INHALATION) at 22:25

## 2021-01-01 RX ADMIN — NYSTATIN: 100000 POWDER TOPICAL at 20:56

## 2021-01-01 RX ADMIN — PROPOFOL 15 MCG/KG/MIN: 10 INJECTION, EMULSION INTRAVENOUS at 18:44

## 2021-01-01 RX ADMIN — MIDAZOLAM HYDROCHLORIDE 10 MG/HR: 5 INJECTION, SOLUTION INTRAMUSCULAR; INTRAVENOUS at 11:03

## 2021-01-01 RX ADMIN — SODIUM CHLORIDE 75 ML/HR: 900 INJECTION, SOLUTION INTRAVENOUS at 20:20

## 2021-01-01 RX ADMIN — METOPROLOL TARTRATE 25 MG: 25 TABLET, FILM COATED ORAL at 21:28

## 2021-01-01 RX ADMIN — FENTANYL CITRATE 25 MCG: 50 INJECTION INTRAMUSCULAR; INTRAVENOUS at 04:41

## 2021-01-01 RX ADMIN — DILTIAZEM HYDROCHLORIDE 300 MG: 180 CAPSULE, COATED, EXTENDED RELEASE ORAL at 11:58

## 2021-10-02 PROBLEM — U07.1 PNEUMONIA DUE TO COVID-19 VIRUS: Status: ACTIVE | Noted: 2021-01-01

## 2021-10-02 PROBLEM — J12.82 PNEUMONIA DUE TO COVID-19 VIRUS: Status: ACTIVE | Noted: 2021-01-01

## 2021-10-07 PROBLEM — J96.01 ACUTE RESPIRATORY FAILURE WITH HYPOXIA (HCC): Status: ACTIVE | Noted: 2021-01-01

## 2021-10-25 LAB
QT INTERVAL: 380 MS
QTC INTERVAL: 438 MS

## (undated) DEVICE — CANN SMPL SOFTECH BIFLO ETCO2 A/M 7FT

## (undated) DEVICE — PAD GRND REM POLYHESIVE A/ DISP

## (undated) DEVICE — Device: Brand: DISPOSABLE ELECTROSURGICAL SNARE

## (undated) DEVICE — SINGLE-USE BIOPSY FORCEPS: Brand: RADIAL JAW 4

## (undated) DEVICE — TRAP SXN POLYP QUICKCATCH LF